# Patient Record
Sex: MALE | Race: WHITE | Employment: OTHER
[De-identification: names, ages, dates, MRNs, and addresses within clinical notes are randomized per-mention and may not be internally consistent; named-entity substitution may affect disease eponyms.]

---

## 2017-01-31 ENCOUNTER — TELEPHONE (OUTPATIENT)
Dept: FAMILY MEDICINE CLINIC | Facility: CLINIC | Age: 82
End: 2017-01-31

## 2017-01-31 RX ORDER — OMEPRAZOLE 20 MG/1
20 CAPSULE, DELAYED RELEASE ORAL DAILY
Qty: 30 CAPSULE | Refills: 2 | Status: CANCELLED | OUTPATIENT
Start: 2017-01-31

## 2017-02-06 DIAGNOSIS — E11.9 CONTROLLED TYPE 2 DIABETES MELLITUS WITHOUT COMPLICATION, WITHOUT LONG-TERM CURRENT USE OF INSULIN (HCC): Primary | ICD-10-CM

## 2017-02-06 RX ORDER — LANCETS 30 GAUGE
EACH MISCELLANEOUS
Qty: 100 EACH | Refills: 3 | Status: SHIPPED | OUTPATIENT
Start: 2017-02-06 | End: 2018-02-02 | Stop reason: SDUPTHER

## 2017-02-15 ENCOUNTER — TELEPHONE (OUTPATIENT)
Dept: FAMILY MEDICINE CLINIC | Facility: CLINIC | Age: 82
End: 2017-02-15

## 2017-02-15 DIAGNOSIS — G25.81 RESTLESS LEG SYNDROME: ICD-10-CM

## 2017-02-15 DIAGNOSIS — F41.9 ANXIETY: ICD-10-CM

## 2017-02-15 RX ORDER — CLONAZEPAM 0.5 MG/1
TABLET ORAL
Qty: 30 TABLET | Refills: 0 | Status: SHIPPED | OUTPATIENT
Start: 2017-02-15 | End: 2017-02-22 | Stop reason: SDUPTHER

## 2017-02-22 ENCOUNTER — TELEPHONE (OUTPATIENT)
Dept: FAMILY MEDICINE CLINIC | Facility: CLINIC | Age: 82
End: 2017-02-22

## 2017-02-22 DIAGNOSIS — F41.9 ANXIETY: ICD-10-CM

## 2017-02-22 DIAGNOSIS — G25.81 RESTLESS LEG SYNDROME: ICD-10-CM

## 2017-02-22 RX ORDER — CLONAZEPAM 0.5 MG/1
TABLET ORAL
Qty: 30 TABLET | Refills: 0 | Status: SHIPPED | OUTPATIENT
Start: 2017-02-22 | End: 2017-05-02 | Stop reason: SDUPTHER

## 2017-03-13 ENCOUNTER — OFFICE VISIT (OUTPATIENT)
Dept: FAMILY MEDICINE CLINIC | Age: 82
End: 2017-03-13
Payer: MEDICARE

## 2017-03-13 VITALS
RESPIRATION RATE: 18 BRPM | DIASTOLIC BLOOD PRESSURE: 62 MMHG | BODY MASS INDEX: 28.49 KG/M2 | SYSTOLIC BLOOD PRESSURE: 130 MMHG | HEART RATE: 66 BPM | WEIGHT: 171 LBS | HEIGHT: 65 IN

## 2017-03-13 DIAGNOSIS — N18.30 CKD STAGE 3 DUE TO TYPE 2 DIABETES MELLITUS (HCC): Primary | ICD-10-CM

## 2017-03-13 DIAGNOSIS — J44.9 CHRONIC OBSTRUCTIVE PULMONARY DISEASE, UNSPECIFIED COPD TYPE (HCC): ICD-10-CM

## 2017-03-13 DIAGNOSIS — I10 ESSENTIAL HYPERTENSION, BENIGN: ICD-10-CM

## 2017-03-13 DIAGNOSIS — E11.22 CKD STAGE 3 DUE TO TYPE 2 DIABETES MELLITUS (HCC): Primary | ICD-10-CM

## 2017-03-13 DIAGNOSIS — E78.00 PURE HYPERCHOLESTEROLEMIA: ICD-10-CM

## 2017-03-13 DIAGNOSIS — H54.40 BLIND RIGHT EYE: ICD-10-CM

## 2017-03-13 PROCEDURE — 1123F ACP DISCUSS/DSCN MKR DOCD: CPT | Performed by: NURSE PRACTITIONER

## 2017-03-13 PROCEDURE — 1036F TOBACCO NON-USER: CPT | Performed by: NURSE PRACTITIONER

## 2017-03-13 PROCEDURE — G8420 CALC BMI NORM PARAMETERS: HCPCS | Performed by: NURSE PRACTITIONER

## 2017-03-13 PROCEDURE — 99214 OFFICE O/P EST MOD 30 MIN: CPT | Performed by: NURSE PRACTITIONER

## 2017-03-13 PROCEDURE — G8926 SPIRO NO PERF OR DOC: HCPCS | Performed by: NURSE PRACTITIONER

## 2017-03-13 PROCEDURE — 4040F PNEUMOC VAC/ADMIN/RCVD: CPT | Performed by: NURSE PRACTITIONER

## 2017-03-13 PROCEDURE — G8484 FLU IMMUNIZE NO ADMIN: HCPCS | Performed by: NURSE PRACTITIONER

## 2017-03-13 PROCEDURE — G8427 DOCREV CUR MEDS BY ELIG CLIN: HCPCS | Performed by: NURSE PRACTITIONER

## 2017-03-13 PROCEDURE — 3023F SPIROM DOC REV: CPT | Performed by: NURSE PRACTITIONER

## 2017-03-13 ASSESSMENT — ENCOUNTER SYMPTOMS
ABDOMINAL DISTENTION: 1
BACK PAIN: 0
RHINORRHEA: 0
COUGH: 0

## 2017-05-02 ENCOUNTER — TELEPHONE (OUTPATIENT)
Dept: FAMILY MEDICINE CLINIC | Age: 82
End: 2017-05-02

## 2017-05-02 RX ORDER — CLONAZEPAM 0.5 MG/1
TABLET ORAL
Qty: 30 TABLET | Refills: 0 | Status: SHIPPED | OUTPATIENT
Start: 2017-05-02 | End: 2017-06-22 | Stop reason: SDUPTHER

## 2017-06-19 ENCOUNTER — OFFICE VISIT (OUTPATIENT)
Dept: FAMILY MEDICINE CLINIC | Age: 82
End: 2017-06-19
Payer: MEDICARE

## 2017-06-19 VITALS
WEIGHT: 169 LBS | HEIGHT: 65 IN | BODY MASS INDEX: 28.16 KG/M2 | RESPIRATION RATE: 18 BRPM | HEART RATE: 66 BPM | DIASTOLIC BLOOD PRESSURE: 60 MMHG | SYSTOLIC BLOOD PRESSURE: 120 MMHG

## 2017-06-19 DIAGNOSIS — E78.00 PURE HYPERCHOLESTEROLEMIA: ICD-10-CM

## 2017-06-19 DIAGNOSIS — F41.9 ANXIETY: ICD-10-CM

## 2017-06-19 DIAGNOSIS — I10 ESSENTIAL HYPERTENSION, BENIGN: Primary | ICD-10-CM

## 2017-06-19 DIAGNOSIS — Z51.81 ENCOUNTER FOR MONITORING STATIN THERAPY: ICD-10-CM

## 2017-06-19 DIAGNOSIS — H54.40 BLIND RIGHT EYE: ICD-10-CM

## 2017-06-19 DIAGNOSIS — N18.30 CKD STAGE 3 DUE TO TYPE 2 DIABETES MELLITUS (HCC): ICD-10-CM

## 2017-06-19 DIAGNOSIS — E11.22 CKD STAGE 3 DUE TO TYPE 2 DIABETES MELLITUS (HCC): ICD-10-CM

## 2017-06-19 DIAGNOSIS — K21.9 GASTROESOPHAGEAL REFLUX DISEASE WITHOUT ESOPHAGITIS: ICD-10-CM

## 2017-06-19 DIAGNOSIS — J44.9 CHRONIC OBSTRUCTIVE PULMONARY DISEASE, UNSPECIFIED COPD TYPE (HCC): ICD-10-CM

## 2017-06-19 DIAGNOSIS — Z79.899 ENCOUNTER FOR MONITORING STATIN THERAPY: ICD-10-CM

## 2017-06-19 DIAGNOSIS — E03.1 CONGENITAL HYPOTHYROIDISM WITHOUT GOITER: ICD-10-CM

## 2017-06-19 PROCEDURE — G8427 DOCREV CUR MEDS BY ELIG CLIN: HCPCS | Performed by: NURSE PRACTITIONER

## 2017-06-19 PROCEDURE — 4040F PNEUMOC VAC/ADMIN/RCVD: CPT | Performed by: NURSE PRACTITIONER

## 2017-06-19 PROCEDURE — 1123F ACP DISCUSS/DSCN MKR DOCD: CPT | Performed by: NURSE PRACTITIONER

## 2017-06-19 PROCEDURE — G8419 CALC BMI OUT NRM PARAM NOF/U: HCPCS | Performed by: NURSE PRACTITIONER

## 2017-06-19 PROCEDURE — G8926 SPIRO NO PERF OR DOC: HCPCS | Performed by: NURSE PRACTITIONER

## 2017-06-19 PROCEDURE — 99214 OFFICE O/P EST MOD 30 MIN: CPT | Performed by: NURSE PRACTITIONER

## 2017-06-19 PROCEDURE — 3023F SPIROM DOC REV: CPT | Performed by: NURSE PRACTITIONER

## 2017-06-19 PROCEDURE — 1036F TOBACCO NON-USER: CPT | Performed by: NURSE PRACTITIONER

## 2017-06-19 ASSESSMENT — ENCOUNTER SYMPTOMS
SINUS PRESSURE: 0
CHEST TIGHTNESS: 0
SHORTNESS OF BREATH: 0
ABDOMINAL DISTENTION: 0
ABDOMINAL PAIN: 0
BLOOD IN STOOL: 0
WHEEZING: 0
CONSTIPATION: 1
EYES NEGATIVE: 1

## 2017-06-22 ENCOUNTER — TELEPHONE (OUTPATIENT)
Dept: FAMILY MEDICINE CLINIC | Age: 82
End: 2017-06-22

## 2017-06-22 RX ORDER — CLONAZEPAM 0.5 MG/1
TABLET ORAL
Qty: 30 TABLET | Refills: 0 | Status: SHIPPED | OUTPATIENT
Start: 2017-06-22 | End: 2017-08-07 | Stop reason: SDUPTHER

## 2017-06-27 ENCOUNTER — HOSPITAL ENCOUNTER (OUTPATIENT)
Age: 82
Discharge: HOME | End: 2017-06-27
Payer: MEDICARE

## 2017-06-27 DIAGNOSIS — Z79.899 ENCOUNTER FOR MONITORING STATIN THERAPY: ICD-10-CM

## 2017-06-27 DIAGNOSIS — N18.30 CKD STAGE 3 DUE TO TYPE 2 DIABETES MELLITUS (HCC): ICD-10-CM

## 2017-06-27 DIAGNOSIS — I10 ESSENTIAL HYPERTENSION, BENIGN: ICD-10-CM

## 2017-06-27 DIAGNOSIS — E03.1 CONGENITAL HYPOTHYROIDISM WITHOUT GOITER: ICD-10-CM

## 2017-06-27 DIAGNOSIS — Z51.81 ENCOUNTER FOR MONITORING STATIN THERAPY: ICD-10-CM

## 2017-06-27 DIAGNOSIS — E11.22 CKD STAGE 3 DUE TO TYPE 2 DIABETES MELLITUS (HCC): ICD-10-CM

## 2017-06-27 DIAGNOSIS — E78.00 PURE HYPERCHOLESTEROLEMIA: ICD-10-CM

## 2017-06-27 LAB
ALBUMIN SERPL-MCNC: 3.8 G/DL (ref 3.5–5.2)
ALBUMIN/GLOBULIN RATIO: ABNORMAL (ref 1–2.5)
ALP BLD-CCNC: 175 U/L (ref 40–129)
ALT SERPL-CCNC: 21 U/L (ref 5–41)
ANION GAP SERPL CALCULATED.3IONS-SCNC: 13 MMOL/L (ref 9–17)
AST SERPL-CCNC: 25 U/L
BILIRUB SERPL-MCNC: 0.58 MG/DL (ref 0.3–1.2)
BUN BLDV-MCNC: 24 MG/DL (ref 8–23)
BUN/CREAT BLD: 13 (ref 9–20)
CALCIUM SERPL-MCNC: 9.6 MG/DL (ref 8.6–10.4)
CHLORIDE BLD-SCNC: 102 MMOL/L (ref 98–107)
CHOLESTEROL/HDL RATIO: 2.5
CHOLESTEROL: 129 MG/DL
CO2: 24 MMOL/L (ref 20–31)
CREAT SERPL-MCNC: 1.79 MG/DL (ref 0.7–1.2)
ESTIMATED AVERAGE GLUCOSE: 151 MG/DL
GFR AFRICAN AMERICAN: 44 ML/MIN
GFR NON-AFRICAN AMERICAN: 36 ML/MIN
GFR SERPL CREATININE-BSD FRML MDRD: ABNORMAL ML/MIN/{1.73_M2}
GFR SERPL CREATININE-BSD FRML MDRD: ABNORMAL ML/MIN/{1.73_M2}
GLUCOSE BLD-MCNC: 117 MG/DL (ref 70–99)
HBA1C MFR BLD: 6.9 % (ref 4.8–5.9)
HCT VFR BLD CALC: 35 % (ref 41–53)
HDLC SERPL-MCNC: 51 MG/DL
HEMOGLOBIN: 11.7 G/DL (ref 13.5–17.5)
LDL CHOLESTEROL: 57 MG/DL (ref 0–130)
PATIENT FASTING?: YES
POTASSIUM SERPL-SCNC: 4.3 MMOL/L (ref 3.7–5.3)
SODIUM BLD-SCNC: 139 MMOL/L (ref 135–144)
THYROXINE, FREE: 0.56 NG/DL (ref 0.93–1.7)
TOTAL PROTEIN: 6.8 G/DL (ref 6.4–8.3)
TRIGL SERPL-MCNC: 107 MG/DL
TSH SERPL DL<=0.05 MIU/L-ACNC: 5.81 MIU/L (ref 0.3–5)
VLDLC SERPL CALC-MCNC: NORMAL MG/DL (ref 1–30)

## 2017-06-27 PROCEDURE — 80061 LIPID PANEL: CPT

## 2017-06-27 PROCEDURE — 85018 HEMOGLOBIN: CPT

## 2017-06-27 PROCEDURE — 36415 COLL VENOUS BLD VENIPUNCTURE: CPT

## 2017-06-27 PROCEDURE — 83036 HEMOGLOBIN GLYCOSYLATED A1C: CPT

## 2017-06-27 PROCEDURE — 84443 ASSAY THYROID STIM HORMONE: CPT

## 2017-06-27 PROCEDURE — 84439 ASSAY OF FREE THYROXINE: CPT

## 2017-06-27 PROCEDURE — 80053 COMPREHEN METABOLIC PANEL: CPT

## 2017-06-27 PROCEDURE — 85014 HEMATOCRIT: CPT

## 2017-07-03 ENCOUNTER — TELEPHONE (OUTPATIENT)
Dept: FAMILY MEDICINE CLINIC | Age: 82
End: 2017-07-03

## 2017-07-03 DIAGNOSIS — E03.0 CONGENITAL HYPOTHYROIDISM WITH DIFFUSE GOITER: ICD-10-CM

## 2017-07-03 RX ORDER — LEVOTHYROXINE SODIUM 0.03 MG/1
25 TABLET ORAL DAILY
Qty: 90 TABLET | Refills: 1 | Status: SHIPPED | OUTPATIENT
Start: 2017-07-03 | End: 2017-08-01 | Stop reason: ALTCHOICE

## 2017-07-31 ENCOUNTER — TELEPHONE (OUTPATIENT)
Dept: FAMILY MEDICINE CLINIC | Age: 82
End: 2017-07-31

## 2017-08-01 ENCOUNTER — APPOINTMENT (OUTPATIENT)
Dept: CT IMAGING | Age: 82
End: 2017-08-01
Payer: MEDICARE

## 2017-08-01 ENCOUNTER — OFFICE VISIT (OUTPATIENT)
Dept: FAMILY MEDICINE CLINIC | Age: 82
End: 2017-08-01
Payer: MEDICARE

## 2017-08-01 ENCOUNTER — CARE COORDINATION (OUTPATIENT)
Dept: CARE COORDINATION | Age: 82
End: 2017-08-01

## 2017-08-01 ENCOUNTER — HOSPITAL ENCOUNTER (OUTPATIENT)
Age: 82
Discharge: HOME | End: 2017-08-01
Payer: MEDICARE

## 2017-08-01 ENCOUNTER — HOSPITAL ENCOUNTER (EMERGENCY)
Age: 82
Discharge: ANOTHER HOSPITAL | End: 2017-08-02
Attending: EMERGENCY MEDICINE
Payer: MEDICARE

## 2017-08-01 VITALS
TEMPERATURE: 98.3 F | SYSTOLIC BLOOD PRESSURE: 120 MMHG | BODY MASS INDEX: 28.29 KG/M2 | DIASTOLIC BLOOD PRESSURE: 62 MMHG | WEIGHT: 170 LBS | RESPIRATION RATE: 12 BRPM

## 2017-08-01 DIAGNOSIS — R17 JAUNDICE: ICD-10-CM

## 2017-08-01 DIAGNOSIS — R17 JAUNDICE: Primary | ICD-10-CM

## 2017-08-01 DIAGNOSIS — K80.50 CHOLEDOCHOLITHIASIS: ICD-10-CM

## 2017-08-01 DIAGNOSIS — R50.9 FEVER, UNSPECIFIED FEVER CAUSE: ICD-10-CM

## 2017-08-01 LAB
-: NORMAL
ABSOLUTE EOS #: 0.2 K/UL (ref 0–0.4)
ABSOLUTE LYMPH #: 1.6 K/UL (ref 0.9–2.5)
ABSOLUTE MONO #: 0.6 K/UL (ref 0–1)
ALBUMIN SERPL-MCNC: 3.7 G/DL (ref 3.5–5.2)
ALBUMIN/GLOBULIN RATIO: ABNORMAL (ref 1–2.5)
ALP BLD-CCNC: 229 U/L (ref 40–129)
ALT SERPL-CCNC: 91 U/L (ref 5–41)
AMORPHOUS: NORMAL
ANION GAP SERPL CALCULATED.3IONS-SCNC: 12 MMOL/L (ref 9–17)
AST SERPL-CCNC: 106 U/L
BACTERIA: NORMAL
BASOPHILS # BLD: 0 %
BASOPHILS ABSOLUTE: 0 K/UL (ref 0–0.2)
BILIRUB SERPL-MCNC: 6.76 MG/DL (ref 0.3–1.2)
BILIRUBIN URINE: NEGATIVE
BUN BLDV-MCNC: 26 MG/DL (ref 8–23)
BUN/CREAT BLD: 15 (ref 9–20)
CALCIUM SERPL-MCNC: 9 MG/DL (ref 8.6–10.4)
CASTS UA: NORMAL /LPF
CHLORIDE BLD-SCNC: 100 MMOL/L (ref 98–107)
CO2: 23 MMOL/L (ref 20–31)
COLOR: YELLOW
COMMENT UA: ABNORMAL
CREAT SERPL-MCNC: 1.68 MG/DL (ref 0.7–1.2)
CRYSTALS, UA: NORMAL /HPF
DIFFERENTIAL TYPE: YES
EOSINOPHILS RELATIVE PERCENT: 3 %
EPITHELIAL CELLS UA: NORMAL /HPF
GFR AFRICAN AMERICAN: 48 ML/MIN
GFR NON-AFRICAN AMERICAN: 39 ML/MIN
GFR SERPL CREATININE-BSD FRML MDRD: ABNORMAL ML/MIN/{1.73_M2}
GFR SERPL CREATININE-BSD FRML MDRD: ABNORMAL ML/MIN/{1.73_M2}
GLUCOSE BLD-MCNC: 124 MG/DL (ref 70–99)
GLUCOSE URINE: NEGATIVE
HAV IGM SER IA-ACNC: NONREACTIVE
HCT VFR BLD CALC: 33.1 % (ref 41–53)
HEMOGLOBIN: 11.2 G/DL (ref 13.5–17.5)
HEPATITIS B CORE IGM ANTIBODY: NONREACTIVE
HEPATITIS B SURFACE ANTIGEN: NONREACTIVE
HEPATITIS C ANTIBODY: NONREACTIVE
KETONES, URINE: NEGATIVE
LEUKOCYTE ESTERASE, URINE: NEGATIVE
LYMPHOCYTES # BLD: 22 %
MCH RBC QN AUTO: 31 PG (ref 26–34)
MCHC RBC AUTO-ENTMCNC: 33.8 G/DL (ref 31–37)
MCV RBC AUTO: 91.8 FL (ref 80–100)
MONOCYTES # BLD: 9 %
MUCUS: NORMAL
NITRITE, URINE: NEGATIVE
OTHER OBSERVATIONS UA: NORMAL
PDW BLD-RTO: 13 % (ref 12.1–15.2)
PH UA: 6 (ref 5–8)
PLATELET # BLD: 110 K/UL (ref 140–450)
PLATELET ESTIMATE: ABNORMAL
PMV BLD AUTO: ABNORMAL FL (ref 6–12)
POTASSIUM SERPL-SCNC: 3.8 MMOL/L (ref 3.7–5.3)
PROTEIN UA: ABNORMAL
RBC # BLD: 3.6 M/UL (ref 4.5–5.9)
RBC # BLD: ABNORMAL 10*6/UL
RBC UA: NORMAL /HPF (ref 0–2)
RENAL EPITHELIAL, UA: NORMAL /HPF
SEG NEUTROPHILS: 66 %
SEGMENTED NEUTROPHILS ABSOLUTE COUNT: 4.7 K/UL (ref 2.1–6.5)
SODIUM BLD-SCNC: 135 MMOL/L (ref 135–144)
SPECIFIC GRAVITY UA: 1.01 (ref 1–1.03)
TOTAL PROTEIN: 6.8 G/DL (ref 6.4–8.3)
TRICHOMONAS: NORMAL
TURBIDITY: CLEAR
URINE HGB: ABNORMAL
UROBILINOGEN, URINE: NORMAL
WBC # BLD: 7.1 K/UL (ref 3.5–11)
WBC # BLD: ABNORMAL 10*3/UL
WBC UA: NORMAL /HPF
YEAST: NORMAL

## 2017-08-01 PROCEDURE — 1123F ACP DISCUSS/DSCN MKR DOCD: CPT | Performed by: FAMILY MEDICINE

## 2017-08-01 PROCEDURE — 36415 COLL VENOUS BLD VENIPUNCTURE: CPT

## 2017-08-01 PROCEDURE — 85025 COMPLETE CBC W/AUTO DIFF WBC: CPT

## 2017-08-01 PROCEDURE — 1036F TOBACCO NON-USER: CPT | Performed by: FAMILY MEDICINE

## 2017-08-01 PROCEDURE — 81001 URINALYSIS AUTO W/SCOPE: CPT

## 2017-08-01 PROCEDURE — 6360000004 HC RX CONTRAST MEDICATION: Performed by: EMERGENCY MEDICINE

## 2017-08-01 PROCEDURE — 2580000003 HC RX 258: Performed by: EMERGENCY MEDICINE

## 2017-08-01 PROCEDURE — 80053 COMPREHEN METABOLIC PANEL: CPT

## 2017-08-01 PROCEDURE — 4040F PNEUMOC VAC/ADMIN/RCVD: CPT | Performed by: FAMILY MEDICINE

## 2017-08-01 PROCEDURE — 99285 EMERGENCY DEPT VISIT HI MDM: CPT

## 2017-08-01 PROCEDURE — G8419 CALC BMI OUT NRM PARAM NOF/U: HCPCS | Performed by: FAMILY MEDICINE

## 2017-08-01 PROCEDURE — 74177 CT ABD & PELVIS W/CONTRAST: CPT

## 2017-08-01 PROCEDURE — 99214 OFFICE O/P EST MOD 30 MIN: CPT | Performed by: FAMILY MEDICINE

## 2017-08-01 PROCEDURE — 80074 ACUTE HEPATITIS PANEL: CPT

## 2017-08-01 PROCEDURE — G8427 DOCREV CUR MEDS BY ELIG CLIN: HCPCS | Performed by: FAMILY MEDICINE

## 2017-08-01 RX ORDER — 0.9 % SODIUM CHLORIDE 0.9 %
1000 INTRAVENOUS SOLUTION INTRAVENOUS ONCE
Status: COMPLETED | OUTPATIENT
Start: 2017-08-01 | End: 2017-08-01

## 2017-08-01 RX ADMIN — SODIUM CHLORIDE 1000 ML: 0.9 INJECTION, SOLUTION INTRAVENOUS at 17:32

## 2017-08-01 RX ADMIN — IOVERSOL 50 ML: 741 INJECTION INTRA-ARTERIAL; INTRAVENOUS at 18:47

## 2017-08-01 ASSESSMENT — ENCOUNTER SYMPTOMS: RESPIRATORY NEGATIVE: 1

## 2017-08-02 ENCOUNTER — APPOINTMENT (OUTPATIENT)
Dept: GENERAL RADIOLOGY | Age: 82
DRG: 446 | End: 2017-08-02
Attending: INTERNAL MEDICINE
Payer: MEDICARE

## 2017-08-02 ENCOUNTER — ANESTHESIA EVENT (OUTPATIENT)
Dept: OPERATING ROOM | Age: 82
DRG: 446 | End: 2017-08-02
Payer: MEDICARE

## 2017-08-02 ENCOUNTER — HOSPITAL ENCOUNTER (INPATIENT)
Age: 82
LOS: 2 days | Discharge: HOME | DRG: 446 | End: 2017-08-04
Attending: INTERNAL MEDICINE | Admitting: INTERNAL MEDICINE
Payer: MEDICARE

## 2017-08-02 ENCOUNTER — ANESTHESIA (OUTPATIENT)
Dept: OPERATING ROOM | Age: 82
DRG: 446 | End: 2017-08-02
Payer: MEDICARE

## 2017-08-02 VITALS
HEART RATE: 69 BPM | HEIGHT: 65 IN | RESPIRATION RATE: 16 BRPM | SYSTOLIC BLOOD PRESSURE: 113 MMHG | TEMPERATURE: 98.1 F | BODY MASS INDEX: 27.49 KG/M2 | WEIGHT: 165 LBS | OXYGEN SATURATION: 96 % | DIASTOLIC BLOOD PRESSURE: 63 MMHG

## 2017-08-02 VITALS
OXYGEN SATURATION: 100 % | RESPIRATION RATE: 12 BRPM | DIASTOLIC BLOOD PRESSURE: 63 MMHG | TEMPERATURE: 97.8 F | SYSTOLIC BLOOD PRESSURE: 102 MMHG

## 2017-08-02 DIAGNOSIS — K80.51 CALCULUS OF BILE DUCT WITHOUT CHOLECYSTITIS WITH OBSTRUCTION: Primary | ICD-10-CM

## 2017-08-02 LAB
EKG ATRIAL RATE: 62 BPM
EKG P AXIS: 49 DEGREES
EKG P-R INTERVAL: 188 MS
EKG Q-T INTERVAL: 444 MS
EKG QRS DURATION: 98 MS
EKG QTC CALCULATION (BAZETT): 450 MS
EKG R AXIS: -44 DEGREES
EKG T AXIS: 0 DEGREES
EKG VENTRICULAR RATE: 62 BPM
ESTIMATED AVERAGE GLUCOSE: 160 MG/DL
GLUCOSE BLD-MCNC: 104 MG/DL (ref 75–110)
GLUCOSE BLD-MCNC: 185 MG/DL (ref 75–110)
HBA1C MFR BLD: 7.2 % (ref 4–6)

## 2017-08-02 PROCEDURE — 83036 HEMOGLOBIN GLYCOSYLATED A1C: CPT

## 2017-08-02 PROCEDURE — 3609018800 HC ERCP DX COLLECTION SPECIMEN BRUSHING/WASHING: Performed by: INTERNAL MEDICINE

## 2017-08-02 PROCEDURE — 99222 1ST HOSP IP/OBS MODERATE 55: CPT | Performed by: INTERNAL MEDICINE

## 2017-08-02 PROCEDURE — 3700000000 HC ANESTHESIA ATTENDED CARE: Performed by: INTERNAL MEDICINE

## 2017-08-02 PROCEDURE — 6360000002 HC RX W HCPCS: Performed by: INTERNAL MEDICINE

## 2017-08-02 PROCEDURE — 6370000000 HC RX 637 (ALT 250 FOR IP): Performed by: FAMILY MEDICINE

## 2017-08-02 PROCEDURE — 6360000002 HC RX W HCPCS: Performed by: NURSE ANESTHETIST, CERTIFIED REGISTERED

## 2017-08-02 PROCEDURE — 6370000000 HC RX 637 (ALT 250 FOR IP): Performed by: INTERNAL MEDICINE

## 2017-08-02 PROCEDURE — 6370000000 HC RX 637 (ALT 250 FOR IP): Performed by: NURSE PRACTITIONER

## 2017-08-02 PROCEDURE — 2580000003 HC RX 258: Performed by: NURSE ANESTHETIST, CERTIFIED REGISTERED

## 2017-08-02 PROCEDURE — C1887 CATHETER, GUIDING: HCPCS | Performed by: INTERNAL MEDICINE

## 2017-08-02 PROCEDURE — 0FC98ZZ EXTIRPATION OF MATTER FROM COMMON BILE DUCT, VIA NATURAL OR ARTIFICIAL OPENING ENDOSCOPIC: ICD-10-PCS | Performed by: INTERNAL MEDICINE

## 2017-08-02 PROCEDURE — 3700000001 HC ADD 15 MINUTES (ANESTHESIA): Performed by: INTERNAL MEDICINE

## 2017-08-02 PROCEDURE — 36415 COLL VENOUS BLD VENIPUNCTURE: CPT

## 2017-08-02 PROCEDURE — 0F798ZZ DILATION OF COMMON BILE DUCT, VIA NATURAL OR ARTIFICIAL OPENING ENDOSCOPIC: ICD-10-PCS | Performed by: INTERNAL MEDICINE

## 2017-08-02 PROCEDURE — 2580000003 HC RX 258: Performed by: NURSE PRACTITIONER

## 2017-08-02 PROCEDURE — 82947 ASSAY GLUCOSE BLOOD QUANT: CPT

## 2017-08-02 PROCEDURE — C1726 CATH, BAL DIL, NON-VASCULAR: HCPCS | Performed by: INTERNAL MEDICINE

## 2017-08-02 PROCEDURE — C1725 CATH, TRANSLUMIN NON-LASER: HCPCS | Performed by: INTERNAL MEDICINE

## 2017-08-02 PROCEDURE — 1200000000 HC SEMI PRIVATE

## 2017-08-02 PROCEDURE — 2500000003 HC RX 250 WO HCPCS: Performed by: NURSE ANESTHETIST, CERTIFIED REGISTERED

## 2017-08-02 PROCEDURE — 3209999900 FLUORO FOR SURGICAL PROCEDURES

## 2017-08-02 PROCEDURE — 7100000001 HC PACU RECOVERY - ADDTL 15 MIN: Performed by: INTERNAL MEDICINE

## 2017-08-02 PROCEDURE — 7100000000 HC PACU RECOVERY - FIRST 15 MIN: Performed by: INTERNAL MEDICINE

## 2017-08-02 PROCEDURE — 93005 ELECTROCARDIOGRAM TRACING: CPT

## 2017-08-02 RX ORDER — SODIUM CHLORIDE 0.9 % (FLUSH) 0.9 %
10 SYRINGE (ML) INJECTION PRN
Status: DISCONTINUED | OUTPATIENT
Start: 2017-08-02 | End: 2017-08-02 | Stop reason: HOSPADM

## 2017-08-02 RX ORDER — SODIUM CHLORIDE 0.9 % (FLUSH) 0.9 %
10 SYRINGE (ML) INJECTION EVERY 12 HOURS SCHEDULED
Status: DISCONTINUED | OUTPATIENT
Start: 2017-08-02 | End: 2017-08-04 | Stop reason: HOSPADM

## 2017-08-02 RX ORDER — ROCURONIUM BROMIDE 10 MG/ML
INJECTION, SOLUTION INTRAVENOUS PRN
Status: DISCONTINUED | OUTPATIENT
Start: 2017-08-02 | End: 2017-08-02 | Stop reason: SDUPTHER

## 2017-08-02 RX ORDER — DEXTROSE MONOHYDRATE 25 G/50ML
12.5 INJECTION, SOLUTION INTRAVENOUS PRN
Status: DISCONTINUED | OUTPATIENT
Start: 2017-08-02 | End: 2017-08-04 | Stop reason: HOSPADM

## 2017-08-02 RX ORDER — SODIUM CHLORIDE, SODIUM LACTATE, POTASSIUM CHLORIDE, CALCIUM CHLORIDE 600; 310; 30; 20 MG/100ML; MG/100ML; MG/100ML; MG/100ML
INJECTION, SOLUTION INTRAVENOUS CONTINUOUS PRN
Status: DISCONTINUED | OUTPATIENT
Start: 2017-08-02 | End: 2017-08-02 | Stop reason: SDUPTHER

## 2017-08-02 RX ORDER — NICOTINE 21 MG/24HR
1 PATCH, TRANSDERMAL 24 HOURS TRANSDERMAL DAILY PRN
Status: DISCONTINUED | OUTPATIENT
Start: 2017-08-02 | End: 2017-08-04 | Stop reason: HOSPADM

## 2017-08-02 RX ORDER — ONDANSETRON 2 MG/ML
INJECTION INTRAMUSCULAR; INTRAVENOUS PRN
Status: DISCONTINUED | OUTPATIENT
Start: 2017-08-02 | End: 2017-08-02 | Stop reason: SDUPTHER

## 2017-08-02 RX ORDER — MIDAZOLAM HYDROCHLORIDE 1 MG/ML
1 INJECTION INTRAMUSCULAR; INTRAVENOUS EVERY 10 MIN PRN
Status: DISCONTINUED | OUTPATIENT
Start: 2017-08-02 | End: 2017-08-02 | Stop reason: HOSPADM

## 2017-08-02 RX ORDER — SODIUM CHLORIDE 0.9 % (FLUSH) 0.9 %
10 SYRINGE (ML) INJECTION EVERY 12 HOURS SCHEDULED
Status: DISCONTINUED | OUTPATIENT
Start: 2017-08-02 | End: 2017-08-02 | Stop reason: HOSPADM

## 2017-08-02 RX ORDER — FENTANYL CITRATE 50 UG/ML
25 INJECTION, SOLUTION INTRAMUSCULAR; INTRAVENOUS EVERY 5 MIN PRN
Status: DISCONTINUED | OUTPATIENT
Start: 2017-08-02 | End: 2017-08-02 | Stop reason: HOSPADM

## 2017-08-02 RX ORDER — ONDANSETRON 2 MG/ML
4 INJECTION INTRAMUSCULAR; INTRAVENOUS EVERY 6 HOURS PRN
Status: DISCONTINUED | OUTPATIENT
Start: 2017-08-02 | End: 2017-08-04 | Stop reason: HOSPADM

## 2017-08-02 RX ORDER — POTASSIUM CHLORIDE 20 MEQ/1
40 TABLET, EXTENDED RELEASE ORAL PRN
Status: DISCONTINUED | OUTPATIENT
Start: 2017-08-02 | End: 2017-08-04 | Stop reason: HOSPADM

## 2017-08-02 RX ORDER — SODIUM CHLORIDE 9 MG/ML
INJECTION, SOLUTION INTRAVENOUS CONTINUOUS
Status: DISCONTINUED | OUTPATIENT
Start: 2017-08-02 | End: 2017-08-04 | Stop reason: HOSPADM

## 2017-08-02 RX ORDER — LIDOCAINE HYDROCHLORIDE 10 MG/ML
INJECTION, SOLUTION EPIDURAL; INFILTRATION; INTRACAUDAL; PERINEURAL PRN
Status: DISCONTINUED | OUTPATIENT
Start: 2017-08-02 | End: 2017-08-02 | Stop reason: SDUPTHER

## 2017-08-02 RX ORDER — MAGNESIUM SULFATE 1 G/100ML
1 INJECTION INTRAVENOUS PRN
Status: DISCONTINUED | OUTPATIENT
Start: 2017-08-02 | End: 2017-08-04 | Stop reason: HOSPADM

## 2017-08-02 RX ORDER — POTASSIUM CHLORIDE 7.45 MG/ML
10 INJECTION INTRAVENOUS PRN
Status: DISCONTINUED | OUTPATIENT
Start: 2017-08-02 | End: 2017-08-04 | Stop reason: HOSPADM

## 2017-08-02 RX ORDER — CIPROFLOXACIN 2 MG/ML
400 INJECTION, SOLUTION INTRAVENOUS EVERY 12 HOURS
Status: DISCONTINUED | OUTPATIENT
Start: 2017-08-02 | End: 2017-08-04 | Stop reason: HOSPADM

## 2017-08-02 RX ORDER — PROPOFOL 10 MG/ML
INJECTION, EMULSION INTRAVENOUS PRN
Status: DISCONTINUED | OUTPATIENT
Start: 2017-08-02 | End: 2017-08-02 | Stop reason: SDUPTHER

## 2017-08-02 RX ORDER — DEXTROSE MONOHYDRATE 50 MG/ML
100 INJECTION, SOLUTION INTRAVENOUS PRN
Status: DISCONTINUED | OUTPATIENT
Start: 2017-08-02 | End: 2017-08-04 | Stop reason: HOSPADM

## 2017-08-02 RX ORDER — MORPHINE SULFATE 2 MG/ML
2 INJECTION, SOLUTION INTRAMUSCULAR; INTRAVENOUS
Status: DISCONTINUED | OUTPATIENT
Start: 2017-08-02 | End: 2017-08-04 | Stop reason: HOSPADM

## 2017-08-02 RX ORDER — FENTANYL CITRATE 50 UG/ML
INJECTION, SOLUTION INTRAMUSCULAR; INTRAVENOUS PRN
Status: DISCONTINUED | OUTPATIENT
Start: 2017-08-02 | End: 2017-08-02 | Stop reason: SDUPTHER

## 2017-08-02 RX ORDER — POTASSIUM CHLORIDE 20MEQ/15ML
40 LIQUID (ML) ORAL PRN
Status: DISCONTINUED | OUTPATIENT
Start: 2017-08-02 | End: 2017-08-04 | Stop reason: HOSPADM

## 2017-08-02 RX ORDER — ACETAMINOPHEN 325 MG/1
650 TABLET ORAL EVERY 4 HOURS PRN
Status: DISCONTINUED | OUTPATIENT
Start: 2017-08-02 | End: 2017-08-04 | Stop reason: HOSPADM

## 2017-08-02 RX ORDER — HYDROCODONE BITARTRATE AND ACETAMINOPHEN 5; 325 MG/1; MG/1
1 TABLET ORAL ONCE
Status: COMPLETED | OUTPATIENT
Start: 2017-08-02 | End: 2017-08-02

## 2017-08-02 RX ORDER — NICOTINE POLACRILEX 4 MG
15 LOZENGE BUCCAL PRN
Status: DISCONTINUED | OUTPATIENT
Start: 2017-08-02 | End: 2017-08-04 | Stop reason: HOSPADM

## 2017-08-02 RX ORDER — SODIUM CHLORIDE 0.9 % (FLUSH) 0.9 %
10 SYRINGE (ML) INJECTION PRN
Status: DISCONTINUED | OUTPATIENT
Start: 2017-08-02 | End: 2017-08-04 | Stop reason: HOSPADM

## 2017-08-02 RX ORDER — BISACODYL 10 MG
10 SUPPOSITORY, RECTAL RECTAL DAILY PRN
Status: DISCONTINUED | OUTPATIENT
Start: 2017-08-02 | End: 2017-08-04 | Stop reason: HOSPADM

## 2017-08-02 RX ORDER — IPRATROPIUM BROMIDE AND ALBUTEROL SULFATE 2.5; .5 MG/3ML; MG/3ML
1 SOLUTION RESPIRATORY (INHALATION) ONCE
Status: DISCONTINUED | OUTPATIENT
Start: 2017-08-02 | End: 2017-08-03

## 2017-08-02 RX ORDER — MORPHINE SULFATE 4 MG/ML
4 INJECTION, SOLUTION INTRAMUSCULAR; INTRAVENOUS
Status: DISCONTINUED | OUTPATIENT
Start: 2017-08-02 | End: 2017-08-04 | Stop reason: HOSPADM

## 2017-08-02 RX ADMIN — CIPROFLOXACIN 400 MG: 2 INJECTION, SOLUTION INTRAVENOUS at 22:44

## 2017-08-02 RX ADMIN — SODIUM CHLORIDE: 9 INJECTION, SOLUTION INTRAVENOUS at 06:12

## 2017-08-02 RX ADMIN — CIPROFLOXACIN 400 MG: 2 INJECTION, SOLUTION INTRAVENOUS at 17:30

## 2017-08-02 RX ADMIN — HYDROCODONE BITARTRATE AND ACETAMINOPHEN 1 TABLET: 5; 325 TABLET ORAL at 02:40

## 2017-08-02 RX ADMIN — INSULIN LISPRO 1 UNITS: 100 INJECTION, SOLUTION INTRAVENOUS; SUBCUTANEOUS at 22:40

## 2017-08-02 RX ADMIN — CIPROFLOXACIN 400 MG: 2 INJECTION, SOLUTION INTRAVENOUS at 10:39

## 2017-08-02 RX ADMIN — FENTANYL CITRATE 100 MCG: 50 INJECTION INTRAMUSCULAR; INTRAVENOUS at 17:15

## 2017-08-02 ASSESSMENT — PAIN SCALES - GENERAL
PAINLEVEL_OUTOF10: 6
PAINLEVEL_OUTOF10: 0

## 2017-08-02 ASSESSMENT — PAIN - FUNCTIONAL ASSESSMENT: PAIN_FUNCTIONAL_ASSESSMENT: 0-10

## 2017-08-02 ASSESSMENT — COPD QUESTIONNAIRES: CAT_SEVERITY: MODERATE

## 2017-08-03 LAB
ABSOLUTE EOS #: 0.15 K/UL (ref 0–0.4)
ABSOLUTE LYMPH #: 0.6 K/UL (ref 1–4.8)
ABSOLUTE MONO #: 0.3 K/UL (ref 0.1–0.8)
ALBUMIN SERPL-MCNC: 3.3 G/DL (ref 3.5–5.2)
ALBUMIN/GLOBULIN RATIO: 1.2 (ref 1–2.5)
ALP BLD-CCNC: 216 U/L (ref 40–129)
ALT SERPL-CCNC: 65 U/L (ref 5–41)
ANION GAP SERPL CALCULATED.3IONS-SCNC: 12 MMOL/L (ref 9–17)
AST SERPL-CCNC: 70 U/L
BASOPHILS # BLD: 0 %
BASOPHILS ABSOLUTE: 0 K/UL (ref 0–0.2)
BILIRUB SERPL-MCNC: 6.77 MG/DL (ref 0.3–1.2)
BUN BLDV-MCNC: 29 MG/DL (ref 8–23)
BUN/CREAT BLD: ABNORMAL (ref 9–20)
CALCIUM SERPL-MCNC: 8.3 MG/DL (ref 8.6–10.4)
CHLORIDE BLD-SCNC: 110 MMOL/L (ref 98–107)
CO2: 20 MMOL/L (ref 20–31)
CREAT SERPL-MCNC: 1.54 MG/DL (ref 0.7–1.2)
DIFFERENTIAL TYPE: ABNORMAL
EOSINOPHILS RELATIVE PERCENT: 3 %
GFR AFRICAN AMERICAN: 53 ML/MIN
GFR NON-AFRICAN AMERICAN: 43 ML/MIN
GFR SERPL CREATININE-BSD FRML MDRD: ABNORMAL ML/MIN/{1.73_M2}
GFR SERPL CREATININE-BSD FRML MDRD: ABNORMAL ML/MIN/{1.73_M2}
GLUCOSE BLD-MCNC: 126 MG/DL (ref 75–110)
GLUCOSE BLD-MCNC: 126 MG/DL (ref 75–110)
GLUCOSE BLD-MCNC: 133 MG/DL (ref 75–110)
GLUCOSE BLD-MCNC: 180 MG/DL (ref 70–99)
GLUCOSE BLD-MCNC: 201 MG/DL (ref 75–110)
GLUCOSE BLD-MCNC: 91 MG/DL (ref 75–110)
HCT VFR BLD CALC: 32.6 % (ref 41–53)
HEMOGLOBIN: 10.8 G/DL (ref 13.5–17.5)
LIPASE: 74 U/L (ref 13–60)
LYMPHOCYTES # BLD: 12 %
MAGNESIUM: 1.9 MG/DL (ref 1.6–2.6)
MCH RBC QN AUTO: 30.6 PG (ref 26–34)
MCHC RBC AUTO-ENTMCNC: 33 G/DL (ref 31–37)
MCV RBC AUTO: 92.6 FL (ref 80–100)
MONOCYTES # BLD: 6 %
MORPHOLOGY: NORMAL
PDW BLD-RTO: 14.1 % (ref 12.5–15.4)
PLATELET # BLD: 123 K/UL (ref 140–450)
PLATELET ESTIMATE: ABNORMAL
PMV BLD AUTO: 6.8 FL (ref 6–12)
POTASSIUM SERPL-SCNC: 3.7 MMOL/L (ref 3.7–5.3)
RBC # BLD: 3.52 M/UL (ref 4.5–5.9)
RBC # BLD: ABNORMAL 10*6/UL
SEG NEUTROPHILS: 79 %
SEGMENTED NEUTROPHILS ABSOLUTE COUNT: 3.95 K/UL (ref 1.8–7.7)
SODIUM BLD-SCNC: 142 MMOL/L (ref 135–144)
TOTAL PROTEIN: 6.1 G/DL (ref 6.4–8.3)
WBC # BLD: 5 K/UL (ref 3.5–11)
WBC # BLD: ABNORMAL 10*3/UL

## 2017-08-03 PROCEDURE — 2500000003 HC RX 250 WO HCPCS: Performed by: INTERNAL MEDICINE

## 2017-08-03 PROCEDURE — 99232 SBSQ HOSP IP/OBS MODERATE 35: CPT | Performed by: INTERNAL MEDICINE

## 2017-08-03 PROCEDURE — 6360000002 HC RX W HCPCS: Performed by: NURSE PRACTITIONER

## 2017-08-03 PROCEDURE — 83690 ASSAY OF LIPASE: CPT

## 2017-08-03 PROCEDURE — 82947 ASSAY GLUCOSE BLOOD QUANT: CPT

## 2017-08-03 PROCEDURE — 36415 COLL VENOUS BLD VENIPUNCTURE: CPT

## 2017-08-03 PROCEDURE — 6370000000 HC RX 637 (ALT 250 FOR IP): Performed by: NURSE PRACTITIONER

## 2017-08-03 PROCEDURE — 6360000002 HC RX W HCPCS: Performed by: INTERNAL MEDICINE

## 2017-08-03 PROCEDURE — 6370000000 HC RX 637 (ALT 250 FOR IP): Performed by: INTERNAL MEDICINE

## 2017-08-03 PROCEDURE — 85025 COMPLETE CBC W/AUTO DIFF WBC: CPT

## 2017-08-03 PROCEDURE — 1200000000 HC SEMI PRIVATE

## 2017-08-03 PROCEDURE — 83735 ASSAY OF MAGNESIUM: CPT

## 2017-08-03 PROCEDURE — 80053 COMPREHEN METABOLIC PANEL: CPT

## 2017-08-03 RX ORDER — ALBUTEROL SULFATE 90 UG/1
2 AEROSOL, METERED RESPIRATORY (INHALATION) EVERY 6 HOURS PRN
Status: DISCONTINUED | OUTPATIENT
Start: 2017-08-03 | End: 2017-08-04 | Stop reason: HOSPADM

## 2017-08-03 RX ORDER — METRONIDAZOLE 500 MG/1
500 TABLET ORAL 3 TIMES DAILY
Qty: 15 TABLET | Refills: 0 | Status: SHIPPED | OUTPATIENT
Start: 2017-08-03 | End: 2017-08-08

## 2017-08-03 RX ORDER — PANTOPRAZOLE SODIUM 40 MG/1
40 TABLET, DELAYED RELEASE ORAL
Status: DISCONTINUED | OUTPATIENT
Start: 2017-08-03 | End: 2017-08-04 | Stop reason: HOSPADM

## 2017-08-03 RX ORDER — CIPROFLOXACIN 500 MG/1
500 TABLET, FILM COATED ORAL 2 TIMES DAILY
Qty: 10 TABLET | Refills: 0 | Status: SHIPPED | OUTPATIENT
Start: 2017-08-03 | End: 2017-08-08

## 2017-08-03 RX ADMIN — METRONIDAZOLE 500 MG: 500 INJECTION, SOLUTION INTRAVENOUS at 02:41

## 2017-08-03 RX ADMIN — CIPROFLOXACIN 400 MG: 2 INJECTION, SOLUTION INTRAVENOUS at 10:27

## 2017-08-03 RX ADMIN — METRONIDAZOLE 500 MG: 500 INJECTION, SOLUTION INTRAVENOUS at 19:52

## 2017-08-03 RX ADMIN — PANTOPRAZOLE SODIUM 40 MG: 40 TABLET, DELAYED RELEASE ORAL at 19:52

## 2017-08-03 RX ADMIN — CIPROFLOXACIN 400 MG: 2 INJECTION, SOLUTION INTRAVENOUS at 23:43

## 2017-08-03 RX ADMIN — INSULIN LISPRO 2 UNITS: 100 INJECTION, SOLUTION INTRAVENOUS; SUBCUTANEOUS at 12:53

## 2017-08-03 RX ADMIN — METRONIDAZOLE 500 MG: 500 INJECTION, SOLUTION INTRAVENOUS at 12:17

## 2017-08-03 RX ADMIN — ACETAMINOPHEN 650 MG: 325 TABLET ORAL at 09:07

## 2017-08-03 RX ADMIN — ENOXAPARIN SODIUM 40 MG: 40 INJECTION SUBCUTANEOUS at 10:29

## 2017-08-03 ASSESSMENT — PAIN SCALES - GENERAL
PAINLEVEL_OUTOF10: 3
PAINLEVEL_OUTOF10: 0

## 2017-08-04 ENCOUNTER — APPOINTMENT (OUTPATIENT)
Dept: ULTRASOUND IMAGING | Age: 82
DRG: 446 | End: 2017-08-04
Attending: INTERNAL MEDICINE
Payer: MEDICARE

## 2017-08-04 VITALS
WEIGHT: 172.4 LBS | HEART RATE: 57 BPM | DIASTOLIC BLOOD PRESSURE: 55 MMHG | TEMPERATURE: 98.4 F | OXYGEN SATURATION: 93 % | HEIGHT: 65 IN | BODY MASS INDEX: 28.72 KG/M2 | RESPIRATION RATE: 19 BRPM | SYSTOLIC BLOOD PRESSURE: 117 MMHG

## 2017-08-04 DIAGNOSIS — E03.0 CONGENITAL HYPOTHYROIDISM WITH DIFFUSE GOITER: ICD-10-CM

## 2017-08-04 LAB
ALBUMIN SERPL-MCNC: 3.3 G/DL (ref 3.5–5.2)
ALBUMIN/GLOBULIN RATIO: 1.3 (ref 1–2.5)
ALP BLD-CCNC: 226 U/L (ref 40–129)
ALT SERPL-CCNC: 64 U/L (ref 5–41)
AST SERPL-CCNC: 79 U/L
BILIRUB SERPL-MCNC: 5.27 MG/DL (ref 0.3–1.2)
BILIRUBIN DIRECT: 4.25 MG/DL
BILIRUBIN, INDIRECT: 1.02 MG/DL (ref 0–1)
GLOBULIN: ABNORMAL G/DL (ref 1.5–3.8)
GLUCOSE BLD-MCNC: 112 MG/DL (ref 75–110)
TOTAL PROTEIN: 5.8 G/DL (ref 6.4–8.3)

## 2017-08-04 PROCEDURE — 6370000000 HC RX 637 (ALT 250 FOR IP): Performed by: INTERNAL MEDICINE

## 2017-08-04 PROCEDURE — 2500000003 HC RX 250 WO HCPCS: Performed by: INTERNAL MEDICINE

## 2017-08-04 PROCEDURE — 99239 HOSP IP/OBS DSCHRG MGMT >30: CPT | Performed by: INTERNAL MEDICINE

## 2017-08-04 PROCEDURE — 6370000000 HC RX 637 (ALT 250 FOR IP): Performed by: NURSE PRACTITIONER

## 2017-08-04 PROCEDURE — 36415 COLL VENOUS BLD VENIPUNCTURE: CPT

## 2017-08-04 PROCEDURE — 76705 ECHO EXAM OF ABDOMEN: CPT

## 2017-08-04 PROCEDURE — 82947 ASSAY GLUCOSE BLOOD QUANT: CPT

## 2017-08-04 PROCEDURE — 6360000002 HC RX W HCPCS: Performed by: INTERNAL MEDICINE

## 2017-08-04 PROCEDURE — 80076 HEPATIC FUNCTION PANEL: CPT

## 2017-08-04 PROCEDURE — 2580000003 HC RX 258: Performed by: NURSE PRACTITIONER

## 2017-08-04 RX ORDER — LEVOTHYROXINE SODIUM 0.03 MG/1
25 TABLET ORAL DAILY
Qty: 90 TABLET | Refills: 1 | Status: SHIPPED | OUTPATIENT
Start: 2017-08-04 | End: 2017-08-07

## 2017-08-04 RX ADMIN — METRONIDAZOLE 500 MG: 500 INJECTION, SOLUTION INTRAVENOUS at 10:59

## 2017-08-04 RX ADMIN — ACETAMINOPHEN 650 MG: 325 TABLET ORAL at 01:09

## 2017-08-04 RX ADMIN — CIPROFLOXACIN 400 MG: 2 INJECTION, SOLUTION INTRAVENOUS at 12:01

## 2017-08-04 RX ADMIN — SODIUM CHLORIDE: 9 INJECTION, SOLUTION INTRAVENOUS at 06:19

## 2017-08-04 RX ADMIN — PANTOPRAZOLE SODIUM 40 MG: 40 TABLET, DELAYED RELEASE ORAL at 06:19

## 2017-08-04 RX ADMIN — METRONIDAZOLE 500 MG: 500 INJECTION, SOLUTION INTRAVENOUS at 02:08

## 2017-08-04 ASSESSMENT — PAIN SCALES - GENERAL
PAINLEVEL_OUTOF10: 2
PAINLEVEL_OUTOF10: 1

## 2017-08-05 ENCOUNTER — CARE COORDINATION (OUTPATIENT)
Dept: CASE MANAGEMENT | Age: 82
End: 2017-08-05

## 2017-08-07 ENCOUNTER — CARE COORDINATION (OUTPATIENT)
Dept: CASE MANAGEMENT | Age: 82
End: 2017-08-07

## 2017-08-07 ENCOUNTER — TELEPHONE (OUTPATIENT)
Dept: FAMILY MEDICINE CLINIC | Age: 82
End: 2017-08-07

## 2017-08-07 ENCOUNTER — TELEPHONE (OUTPATIENT)
Dept: PHARMACY | Facility: CLINIC | Age: 82
End: 2017-08-07

## 2017-08-07 DIAGNOSIS — E03.1 CONGENITAL HYPOTHYROIDISM WITHOUT GOITER: Primary | ICD-10-CM

## 2017-08-07 RX ORDER — CLONAZEPAM 0.5 MG/1
TABLET ORAL
Qty: 30 TABLET | Refills: 1 | Status: SHIPPED | OUTPATIENT
Start: 2017-08-07 | End: 2017-10-16 | Stop reason: SDUPTHER

## 2017-08-08 ENCOUNTER — HOSPITAL ENCOUNTER (OUTPATIENT)
Age: 82
Discharge: HOME | End: 2017-08-08
Payer: MEDICARE

## 2017-08-08 ENCOUNTER — OFFICE VISIT (OUTPATIENT)
Dept: FAMILY MEDICINE CLINIC | Age: 82
End: 2017-08-08
Payer: MEDICARE

## 2017-08-08 VITALS
HEIGHT: 65 IN | BODY MASS INDEX: 28.99 KG/M2 | SYSTOLIC BLOOD PRESSURE: 134 MMHG | WEIGHT: 174 LBS | HEART RATE: 56 BPM | DIASTOLIC BLOOD PRESSURE: 68 MMHG

## 2017-08-08 DIAGNOSIS — E03.9 ACQUIRED HYPOTHYROIDISM: ICD-10-CM

## 2017-08-08 DIAGNOSIS — Z09 HOSPITAL DISCHARGE FOLLOW-UP: ICD-10-CM

## 2017-08-08 DIAGNOSIS — F51.01 PRIMARY INSOMNIA: ICD-10-CM

## 2017-08-08 DIAGNOSIS — K80.51 CALCULUS OF BILE DUCT WITHOUT CHOLECYSTITIS WITH OBSTRUCTION: Primary | ICD-10-CM

## 2017-08-08 DIAGNOSIS — K59.01 CONSTIPATION, SLOW TRANSIT: ICD-10-CM

## 2017-08-08 DIAGNOSIS — K80.51 CALCULUS OF BILE DUCT WITHOUT CHOLECYSTITIS WITH OBSTRUCTION: ICD-10-CM

## 2017-08-08 LAB
ABSOLUTE EOS #: 0.2 K/UL (ref 0–0.4)
ABSOLUTE LYMPH #: 1.8 K/UL (ref 0.9–2.5)
ABSOLUTE MONO #: 0.4 K/UL (ref 0–1)
ALBUMIN SERPL-MCNC: 3.5 G/DL (ref 3.5–5.2)
ALBUMIN/GLOBULIN RATIO: ABNORMAL (ref 1–2.5)
ALP BLD-CCNC: 399 U/L (ref 40–129)
ALT SERPL-CCNC: 62 U/L (ref 5–41)
ANION GAP SERPL CALCULATED.3IONS-SCNC: 12 MMOL/L (ref 9–17)
AST SERPL-CCNC: 92 U/L
BASOPHILS # BLD: 1 %
BASOPHILS ABSOLUTE: 0 K/UL (ref 0–0.2)
BILIRUB SERPL-MCNC: 3.09 MG/DL (ref 0.3–1.2)
BUN BLDV-MCNC: 22 MG/DL (ref 8–23)
BUN/CREAT BLD: 13 (ref 9–20)
CALCIUM SERPL-MCNC: 8.9 MG/DL (ref 8.6–10.4)
CHLORIDE BLD-SCNC: 105 MMOL/L (ref 98–107)
CO2: 22 MMOL/L (ref 20–31)
CREAT SERPL-MCNC: 1.64 MG/DL (ref 0.7–1.2)
DIFFERENTIAL TYPE: YES
EOSINOPHILS RELATIVE PERCENT: 5 %
GFR AFRICAN AMERICAN: 49 ML/MIN
GFR NON-AFRICAN AMERICAN: 40 ML/MIN
GFR SERPL CREATININE-BSD FRML MDRD: ABNORMAL ML/MIN/{1.73_M2}
GFR SERPL CREATININE-BSD FRML MDRD: ABNORMAL ML/MIN/{1.73_M2}
GLUCOSE BLD-MCNC: 227 MG/DL (ref 70–99)
HCT VFR BLD CALC: 31.6 % (ref 41–53)
HEMOGLOBIN: 10.4 G/DL (ref 13.5–17.5)
LYMPHOCYTES # BLD: 37 %
MCH RBC QN AUTO: 30.6 PG (ref 26–34)
MCHC RBC AUTO-ENTMCNC: 32.8 G/DL (ref 31–37)
MCV RBC AUTO: 93.4 FL (ref 80–100)
MONOCYTES # BLD: 9 %
PDW BLD-RTO: 14.5 % (ref 12.1–15.2)
PLATELET # BLD: 195 K/UL (ref 140–450)
PLATELET ESTIMATE: ABNORMAL
PMV BLD AUTO: ABNORMAL FL (ref 6–12)
POTASSIUM SERPL-SCNC: 3.6 MMOL/L (ref 3.7–5.3)
RBC # BLD: 3.38 M/UL (ref 4.5–5.9)
RBC # BLD: ABNORMAL 10*6/UL
SEG NEUTROPHILS: 48 %
SEGMENTED NEUTROPHILS ABSOLUTE COUNT: 2.3 K/UL (ref 2.1–6.5)
SODIUM BLD-SCNC: 139 MMOL/L (ref 135–144)
THYROXINE, FREE: 0.65 NG/DL (ref 0.93–1.7)
TOTAL PROTEIN: 6.3 G/DL (ref 6.4–8.3)
TSH SERPL DL<=0.05 MIU/L-ACNC: 2.27 MIU/L (ref 0.3–5)
WBC # BLD: 4.8 K/UL (ref 3.5–11)
WBC # BLD: ABNORMAL 10*3/UL

## 2017-08-08 PROCEDURE — 84439 ASSAY OF FREE THYROXINE: CPT

## 2017-08-08 PROCEDURE — 99496 TRANSJ CARE MGMT HIGH F2F 7D: CPT | Performed by: FAMILY MEDICINE

## 2017-08-08 PROCEDURE — 36415 COLL VENOUS BLD VENIPUNCTURE: CPT

## 2017-08-08 PROCEDURE — 85025 COMPLETE CBC W/AUTO DIFF WBC: CPT

## 2017-08-08 PROCEDURE — 84443 ASSAY THYROID STIM HORMONE: CPT

## 2017-08-08 PROCEDURE — 80053 COMPREHEN METABOLIC PANEL: CPT

## 2017-08-08 RX ORDER — LEVOTHYROXINE AND LIOTHYRONINE 9.5; 2.25 UG/1; UG/1
15 TABLET ORAL DAILY
Qty: 30 TABLET | Refills: 1 | Status: SHIPPED | OUTPATIENT
Start: 2017-08-08 | End: 2017-10-16 | Stop reason: SDUPTHER

## 2017-08-09 ASSESSMENT — ENCOUNTER SYMPTOMS
RESPIRATORY NEGATIVE: 1
GASTROINTESTINAL NEGATIVE: 1

## 2017-08-10 ENCOUNTER — CARE COORDINATION (OUTPATIENT)
Dept: CASE MANAGEMENT | Age: 82
End: 2017-08-10

## 2017-08-16 ENCOUNTER — CARE COORDINATION (OUTPATIENT)
Dept: CARE COORDINATION | Age: 82
End: 2017-08-16

## 2017-08-17 ENCOUNTER — HOSPITAL ENCOUNTER (OUTPATIENT)
Age: 82
Discharge: HOME | End: 2017-08-17
Payer: MEDICARE

## 2017-08-17 ENCOUNTER — TELEPHONE (OUTPATIENT)
Dept: FAMILY MEDICINE CLINIC | Age: 82
End: 2017-08-17

## 2017-08-17 ENCOUNTER — TELEPHONE (OUTPATIENT)
Dept: GASTROENTEROLOGY | Age: 82
End: 2017-08-17

## 2017-08-17 ENCOUNTER — CARE COORDINATION (OUTPATIENT)
Dept: CARE COORDINATION | Age: 82
End: 2017-08-17

## 2017-08-17 DIAGNOSIS — R74.8 ELEVATED LIVER ENZYMES: Primary | ICD-10-CM

## 2017-08-17 DIAGNOSIS — K74.60 CIRRHOSIS OF LIVER WITHOUT ASCITES, UNSPECIFIED HEPATIC CIRRHOSIS TYPE (HCC): ICD-10-CM

## 2017-08-17 DIAGNOSIS — R74.8 ABNORMAL LIVER ENZYMES: ICD-10-CM

## 2017-08-17 DIAGNOSIS — R74.8 ELEVATED LIVER ENZYMES: ICD-10-CM

## 2017-08-17 DIAGNOSIS — K74.60 CIRRHOSIS OF LIVER WITHOUT ASCITES, UNSPECIFIED HEPATIC CIRRHOSIS TYPE (HCC): Primary | ICD-10-CM

## 2017-08-17 LAB
ALBUMIN SERPL-MCNC: 3.5 G/DL (ref 3.5–5.2)
ALBUMIN SERPL-MCNC: 3.6 G/DL (ref 3.5–5.2)
ALBUMIN/GLOBULIN RATIO: ABNORMAL (ref 1–2.5)
ALBUMIN/GLOBULIN RATIO: ABNORMAL (ref 1–2.5)
ALP BLD-CCNC: 386 U/L (ref 40–129)
ALP BLD-CCNC: 394 U/L (ref 40–129)
ALT SERPL-CCNC: 46 U/L (ref 5–41)
ALT SERPL-CCNC: 46 U/L (ref 5–41)
ANION GAP SERPL CALCULATED.3IONS-SCNC: 12 MMOL/L (ref 9–17)
AST SERPL-CCNC: 84 U/L
AST SERPL-CCNC: 85 U/L
BILIRUB SERPL-MCNC: 1.73 MG/DL (ref 0.3–1.2)
BILIRUB SERPL-MCNC: 1.79 MG/DL (ref 0.3–1.2)
BILIRUBIN DIRECT: 0.96 MG/DL
BILIRUBIN, INDIRECT: 0.83 MG/DL (ref 0–1)
BUN BLDV-MCNC: 16 MG/DL (ref 8–23)
BUN/CREAT BLD: 10 (ref 9–20)
CALCIUM SERPL-MCNC: 9.2 MG/DL (ref 8.6–10.4)
CHLORIDE BLD-SCNC: 98 MMOL/L (ref 98–107)
CO2: 24 MMOL/L (ref 20–31)
CREAT SERPL-MCNC: 1.54 MG/DL (ref 0.7–1.2)
GFR AFRICAN AMERICAN: 53 ML/MIN
GFR NON-AFRICAN AMERICAN: 43 ML/MIN
GFR SERPL CREATININE-BSD FRML MDRD: ABNORMAL ML/MIN/{1.73_M2}
GFR SERPL CREATININE-BSD FRML MDRD: ABNORMAL ML/MIN/{1.73_M2}
GLOBULIN: ABNORMAL G/DL (ref 1.5–3.8)
GLUCOSE BLD-MCNC: 205 MG/DL (ref 70–99)
POTASSIUM SERPL-SCNC: 3.6 MMOL/L (ref 3.7–5.3)
SODIUM BLD-SCNC: 134 MMOL/L (ref 135–144)
TOTAL PROTEIN: 6.7 G/DL (ref 6.4–8.3)
TOTAL PROTEIN: 6.7 G/DL (ref 6.4–8.3)

## 2017-08-17 PROCEDURE — 36415 COLL VENOUS BLD VENIPUNCTURE: CPT

## 2017-08-17 PROCEDURE — 80053 COMPREHEN METABOLIC PANEL: CPT

## 2017-08-17 PROCEDURE — 80076 HEPATIC FUNCTION PANEL: CPT

## 2017-08-18 ENCOUNTER — CARE COORDINATION (OUTPATIENT)
Dept: CASE MANAGEMENT | Age: 82
End: 2017-08-18

## 2017-08-18 ENCOUNTER — TELEPHONE (OUTPATIENT)
Dept: FAMILY MEDICINE CLINIC | Age: 82
End: 2017-08-18

## 2017-08-18 DIAGNOSIS — Z13.9 SCREENING PROCEDURE: Primary | ICD-10-CM

## 2017-08-21 DIAGNOSIS — Z13.9 SCREENING PROCEDURE: ICD-10-CM

## 2017-08-22 ENCOUNTER — HOSPITAL ENCOUNTER (OUTPATIENT)
Age: 82
Discharge: HOME | End: 2017-08-22
Payer: MEDICARE

## 2017-08-22 ENCOUNTER — TELEPHONE (OUTPATIENT)
Dept: FAMILY MEDICINE CLINIC | Age: 82
End: 2017-08-22

## 2017-08-22 DIAGNOSIS — R74.8 ELEVATED LIVER ENZYMES: Primary | ICD-10-CM

## 2017-08-22 DIAGNOSIS — R74.8 ELEVATED LIVER ENZYMES: ICD-10-CM

## 2017-08-22 LAB
ALBUMIN SERPL-MCNC: 3.6 G/DL (ref 3.5–5.2)
ALBUMIN/GLOBULIN RATIO: ABNORMAL (ref 1–2.5)
ALP BLD-CCNC: 340 U/L (ref 40–129)
ALT SERPL-CCNC: 39 U/L (ref 5–41)
AST SERPL-CCNC: 73 U/L
BILIRUB SERPL-MCNC: 1.48 MG/DL (ref 0.3–1.2)
BILIRUBIN DIRECT: 0.76 MG/DL
BILIRUBIN, INDIRECT: 0.72 MG/DL (ref 0–1)
GLOBULIN: ABNORMAL G/DL (ref 1.5–3.8)
TOTAL PROTEIN: 6.8 G/DL (ref 6.4–8.3)

## 2017-08-22 PROCEDURE — 80076 HEPATIC FUNCTION PANEL: CPT

## 2017-08-22 PROCEDURE — 36415 COLL VENOUS BLD VENIPUNCTURE: CPT

## 2017-08-22 RX ORDER — PANTOPRAZOLE SODIUM 40 MG/1
TABLET, DELAYED RELEASE ORAL
Qty: 90 TABLET | Refills: 1 | Status: SHIPPED | OUTPATIENT
Start: 2017-08-22 | End: 2018-02-04 | Stop reason: SDUPTHER

## 2017-08-23 RX ORDER — GLIPIZIDE 10 MG/1
TABLET ORAL
Qty: 90 TABLET | Refills: 1 | Status: SHIPPED | OUTPATIENT
Start: 2017-08-23 | End: 2018-02-04 | Stop reason: SDUPTHER

## 2017-08-24 ENCOUNTER — CARE COORDINATION (OUTPATIENT)
Dept: CARE COORDINATION | Age: 82
End: 2017-08-24

## 2017-08-28 ENCOUNTER — OFFICE VISIT (OUTPATIENT)
Dept: FAMILY MEDICINE CLINIC | Age: 82
End: 2017-08-28
Payer: MEDICARE

## 2017-08-28 ENCOUNTER — HOSPITAL ENCOUNTER (OUTPATIENT)
Age: 82
Discharge: HOME | End: 2017-08-28
Payer: MEDICARE

## 2017-08-28 VITALS
SYSTOLIC BLOOD PRESSURE: 136 MMHG | HEIGHT: 65 IN | WEIGHT: 169 LBS | BODY MASS INDEX: 28.16 KG/M2 | DIASTOLIC BLOOD PRESSURE: 64 MMHG

## 2017-08-28 DIAGNOSIS — R74.8 ELEVATED LIVER ENZYMES: ICD-10-CM

## 2017-08-28 DIAGNOSIS — R74.8 ELEVATED LIVER ENZYMES: Primary | ICD-10-CM

## 2017-08-28 DIAGNOSIS — K59.09 CHRONIC CONSTIPATION: ICD-10-CM

## 2017-08-28 DIAGNOSIS — E03.9 ACQUIRED HYPOTHYROIDISM: ICD-10-CM

## 2017-08-28 LAB
ALBUMIN SERPL-MCNC: 3.7 G/DL (ref 3.5–5.2)
ALBUMIN/GLOBULIN RATIO: ABNORMAL (ref 1–2.5)
ALP BLD-CCNC: 296 U/L (ref 40–129)
ALT SERPL-CCNC: 33 U/L (ref 5–41)
AST SERPL-CCNC: 63 U/L
BILIRUB SERPL-MCNC: 1.11 MG/DL (ref 0.3–1.2)
BILIRUBIN DIRECT: 0.61 MG/DL
BILIRUBIN, INDIRECT: 0.5 MG/DL (ref 0–1)
GLOBULIN: ABNORMAL G/DL (ref 1.5–3.8)
TOTAL PROTEIN: 6.9 G/DL (ref 6.4–8.3)

## 2017-08-28 PROCEDURE — G8427 DOCREV CUR MEDS BY ELIG CLIN: HCPCS | Performed by: FAMILY MEDICINE

## 2017-08-28 PROCEDURE — 36415 COLL VENOUS BLD VENIPUNCTURE: CPT

## 2017-08-28 PROCEDURE — 1123F ACP DISCUSS/DSCN MKR DOCD: CPT | Performed by: FAMILY MEDICINE

## 2017-08-28 PROCEDURE — G8419 CALC BMI OUT NRM PARAM NOF/U: HCPCS | Performed by: FAMILY MEDICINE

## 2017-08-28 PROCEDURE — 99214 OFFICE O/P EST MOD 30 MIN: CPT | Performed by: FAMILY MEDICINE

## 2017-08-28 PROCEDURE — 1111F DSCHRG MED/CURRENT MED MERGE: CPT | Performed by: FAMILY MEDICINE

## 2017-08-28 PROCEDURE — 80076 HEPATIC FUNCTION PANEL: CPT

## 2017-08-28 PROCEDURE — 4040F PNEUMOC VAC/ADMIN/RCVD: CPT | Performed by: FAMILY MEDICINE

## 2017-08-28 PROCEDURE — 1036F TOBACCO NON-USER: CPT | Performed by: FAMILY MEDICINE

## 2017-08-30 ENCOUNTER — CARE COORDINATION (OUTPATIENT)
Dept: CARE COORDINATION | Age: 82
End: 2017-08-30

## 2017-09-07 ENCOUNTER — HOSPITAL ENCOUNTER (OUTPATIENT)
Age: 82
Discharge: HOME | End: 2017-09-07
Payer: MEDICARE

## 2017-09-07 ENCOUNTER — TELEPHONE (OUTPATIENT)
Dept: FAMILY MEDICINE CLINIC | Age: 82
End: 2017-09-07

## 2017-09-07 ENCOUNTER — CARE COORDINATION (OUTPATIENT)
Dept: CARE COORDINATION | Age: 82
End: 2017-09-07

## 2017-09-07 DIAGNOSIS — E78.00 HYPERCHOLESTEROLEMIA: ICD-10-CM

## 2017-09-07 DIAGNOSIS — R74.8 ELEVATED LIVER ENZYMES: ICD-10-CM

## 2017-09-07 LAB
ALBUMIN SERPL-MCNC: 3.8 G/DL (ref 3.5–5.2)
ALBUMIN/GLOBULIN RATIO: ABNORMAL (ref 1–2.5)
ALP BLD-CCNC: 253 U/L (ref 40–129)
ALT SERPL-CCNC: 28 U/L (ref 5–41)
AST SERPL-CCNC: 52 U/L
BILIRUB SERPL-MCNC: 0.86 MG/DL (ref 0.3–1.2)
BILIRUBIN DIRECT: 0.44 MG/DL
BILIRUBIN, INDIRECT: 0.42 MG/DL (ref 0–1)
GLOBULIN: ABNORMAL G/DL (ref 1.5–3.8)
TOTAL PROTEIN: 6.8 G/DL (ref 6.4–8.3)

## 2017-09-07 PROCEDURE — 36415 COLL VENOUS BLD VENIPUNCTURE: CPT

## 2017-09-07 PROCEDURE — 80076 HEPATIC FUNCTION PANEL: CPT

## 2017-09-08 RX ORDER — SIMVASTATIN 10 MG
TABLET ORAL
Qty: 30 TABLET | Refills: 6 | Status: SHIPPED | OUTPATIENT
Start: 2017-09-08 | End: 2018-05-02 | Stop reason: SDUPTHER

## 2017-09-13 ENCOUNTER — CARE COORDINATION (OUTPATIENT)
Dept: CARE COORDINATION | Age: 82
End: 2017-09-13

## 2017-09-13 DIAGNOSIS — K59.04 CHRONIC IDIOPATHIC CONSTIPATION: ICD-10-CM

## 2017-09-13 DIAGNOSIS — K14.4 SMOOTH TONGUE: ICD-10-CM

## 2017-09-13 DIAGNOSIS — Z12.5 SCREENING FOR PROSTATE CANCER: Primary | ICD-10-CM

## 2017-09-13 RX ORDER — SENNA PLUS 8.6 MG/1
1 TABLET ORAL DAILY
COMMUNITY
End: 2019-05-13

## 2017-09-14 ENCOUNTER — CARE COORDINATION (OUTPATIENT)
Dept: CARE COORDINATION | Age: 82
End: 2017-09-14

## 2017-09-15 ENCOUNTER — HOSPITAL ENCOUNTER (OUTPATIENT)
Age: 82
Discharge: HOME | End: 2017-09-15
Payer: MEDICARE

## 2017-09-15 ENCOUNTER — TELEPHONE (OUTPATIENT)
Dept: FAMILY MEDICINE CLINIC | Age: 82
End: 2017-09-15

## 2017-09-15 DIAGNOSIS — Z12.5 SCREENING FOR PROSTATE CANCER: ICD-10-CM

## 2017-09-15 DIAGNOSIS — K14.4 SMOOTH TONGUE: ICD-10-CM

## 2017-09-15 DIAGNOSIS — R74.8 ELEVATED LIVER ENZYMES: Primary | ICD-10-CM

## 2017-09-15 DIAGNOSIS — R74.8 ELEVATED LIVER ENZYMES: ICD-10-CM

## 2017-09-15 DIAGNOSIS — K59.04 CHRONIC IDIOPATHIC CONSTIPATION: ICD-10-CM

## 2017-09-15 LAB
ALBUMIN SERPL-MCNC: 3.8 G/DL (ref 3.5–5.2)
ALBUMIN/GLOBULIN RATIO: ABNORMAL (ref 1–2.5)
ALP BLD-CCNC: 251 U/L (ref 40–129)
ALT SERPL-CCNC: 25 U/L (ref 5–41)
AST SERPL-CCNC: 48 U/L
BILIRUB SERPL-MCNC: 0.78 MG/DL (ref 0.3–1.2)
BILIRUBIN DIRECT: 0.39 MG/DL
BILIRUBIN, INDIRECT: 0.39 MG/DL (ref 0–1)
FOLATE: >20 NG/ML
GLOBULIN: ABNORMAL G/DL (ref 1.5–3.8)
MAGNESIUM: 2.3 MG/DL (ref 1.6–2.6)
PROSTATE SPECIFIC ANTIGEN: 0.4 UG/L
TOTAL PROTEIN: 6.9 G/DL (ref 6.4–8.3)
VITAMIN B-12: 524 PG/ML (ref 211–946)

## 2017-09-15 PROCEDURE — 82746 ASSAY OF FOLIC ACID SERUM: CPT

## 2017-09-15 PROCEDURE — 80076 HEPATIC FUNCTION PANEL: CPT

## 2017-09-15 PROCEDURE — G0103 PSA SCREENING: HCPCS

## 2017-09-15 PROCEDURE — 36415 COLL VENOUS BLD VENIPUNCTURE: CPT

## 2017-09-15 PROCEDURE — 82607 VITAMIN B-12: CPT

## 2017-09-15 PROCEDURE — 83735 ASSAY OF MAGNESIUM: CPT

## 2017-09-18 ENCOUNTER — OFFICE VISIT (OUTPATIENT)
Dept: FAMILY MEDICINE CLINIC | Age: 82
End: 2017-09-18
Payer: MEDICARE

## 2017-09-18 VITALS
BODY MASS INDEX: 28.16 KG/M2 | HEIGHT: 65 IN | SYSTOLIC BLOOD PRESSURE: 120 MMHG | OXYGEN SATURATION: 98 % | RESPIRATION RATE: 18 BRPM | HEART RATE: 75 BPM | DIASTOLIC BLOOD PRESSURE: 64 MMHG | WEIGHT: 169 LBS

## 2017-09-18 DIAGNOSIS — R74.8 ELEVATED LIVER ENZYMES: Primary | ICD-10-CM

## 2017-09-18 DIAGNOSIS — E03.1 CONGENITAL HYPOTHYROIDISM WITHOUT GOITER: ICD-10-CM

## 2017-09-18 DIAGNOSIS — D63.8 ANEMIA IN OTHER CHRONIC DISEASES CLASSIFIED ELSEWHERE: ICD-10-CM

## 2017-09-18 DIAGNOSIS — F41.1 ANXIETY STATE: ICD-10-CM

## 2017-09-18 DIAGNOSIS — K21.9 GASTROESOPHAGEAL REFLUX DISEASE WITHOUT ESOPHAGITIS: ICD-10-CM

## 2017-09-18 DIAGNOSIS — N18.30 CKD STAGE 3 DUE TO TYPE 2 DIABETES MELLITUS (HCC): ICD-10-CM

## 2017-09-18 DIAGNOSIS — E78.00 PURE HYPERCHOLESTEROLEMIA: ICD-10-CM

## 2017-09-18 DIAGNOSIS — E11.22 CKD STAGE 3 DUE TO TYPE 2 DIABETES MELLITUS (HCC): ICD-10-CM

## 2017-09-18 DIAGNOSIS — H54.40 BLIND RIGHT EYE: ICD-10-CM

## 2017-09-18 PROCEDURE — 4040F PNEUMOC VAC/ADMIN/RCVD: CPT | Performed by: NURSE PRACTITIONER

## 2017-09-18 PROCEDURE — 99214 OFFICE O/P EST MOD 30 MIN: CPT | Performed by: NURSE PRACTITIONER

## 2017-09-18 PROCEDURE — 1123F ACP DISCUSS/DSCN MKR DOCD: CPT | Performed by: NURSE PRACTITIONER

## 2017-09-18 PROCEDURE — G8417 CALC BMI ABV UP PARAM F/U: HCPCS | Performed by: NURSE PRACTITIONER

## 2017-09-18 PROCEDURE — 1036F TOBACCO NON-USER: CPT | Performed by: NURSE PRACTITIONER

## 2017-09-18 PROCEDURE — G8427 DOCREV CUR MEDS BY ELIG CLIN: HCPCS | Performed by: NURSE PRACTITIONER

## 2017-09-18 RX ORDER — ALBUTEROL SULFATE 90 UG/1
2 AEROSOL, METERED RESPIRATORY (INHALATION) EVERY 6 HOURS PRN
Qty: 3 INHALER | Refills: 3 | Status: SHIPPED | OUTPATIENT
Start: 2017-09-18 | End: 2019-03-06 | Stop reason: SDUPTHER

## 2017-09-18 ASSESSMENT — PATIENT HEALTH QUESTIONNAIRE - PHQ9
1. LITTLE INTEREST OR PLEASURE IN DOING THINGS: 0
SUM OF ALL RESPONSES TO PHQ QUESTIONS 1-9: 0
2. FEELING DOWN, DEPRESSED OR HOPELESS: 0
SUM OF ALL RESPONSES TO PHQ9 QUESTIONS 1 & 2: 0

## 2017-09-21 ASSESSMENT — ENCOUNTER SYMPTOMS
CONSTIPATION: 1
COUGH: 0
EYES NEGATIVE: 1
RHINORRHEA: 0
BACK PAIN: 1
WHEEZING: 0
SHORTNESS OF BREATH: 0
CHEST TIGHTNESS: 0
BLOOD IN STOOL: 0
ABDOMINAL DISTENTION: 1

## 2017-09-22 ENCOUNTER — CARE COORDINATION (OUTPATIENT)
Dept: CARE COORDINATION | Age: 82
End: 2017-09-22

## 2017-09-22 ASSESSMENT — ENCOUNTER SYMPTOMS: DYSPNEA ASSOCIATED WITH: EXERTION

## 2017-10-06 ENCOUNTER — CARE COORDINATION (OUTPATIENT)
Dept: CARE COORDINATION | Age: 82
End: 2017-10-06

## 2017-10-06 ASSESSMENT — ENCOUNTER SYMPTOMS: DYSPNEA ASSOCIATED WITH: EXERTION

## 2017-10-06 NOTE — CARE COORDINATION
Ambulatory Care Coordination Note  10/6/2017  CM Risk Score: 4  Mana Mortality Risk Score: 19.42    ACC: Gus Herrmann, RN    Summary Note: Phone call to patient. Patient is in good spirits and talkative. Patient reports he has shortness of breath on exertion and he takes Albubuterol inhaler 2 puffs at bedtime. Encouraged patient to rinse mouth after every use and also explained the inhaler is ordered as needed for shortness of breath so he may not need to take it every night. Patient verbalizes understanding. Patient states his blood sugar was 114 this morning, so he did not take any glipizide. Patient states he has returned to his Sennoside([employer]) qod and his bowels have returned to normal.  Bowels are soft, but not watery at this time. Patient denies having any chest pain. Patient states his right leg has healed and he no longer has any redness or bumps present. Confirmed patient's appointment with Irasema Marquis for 10/16/2017 1:30. Patient states he will need the [employer] to bring him. Patient states he will come early to have the labs drawn that Carmelita Hunter ordered. Confirm with Irasema Marquis CNP that patient can have labs immediately prior to appointment on 10/16/2017. Phone to centralized scheduling-van  at 12:30 on 10/16/2017 to allow time for lab draw. Phone [facility] to patient [facility]  at 12;30 10/16/2017 to allow time for labs prior to appointment. Patient verbalizes understanding. CC plan: Will follow up with patient after appointment with KANWAL Connell CNP. Diabetes Assessment    Medic Alert ID:  No   Meal Planning:  Avoidance of concentrated sweets   How often do you test your blood sugar?:  Daily   Do you have barriers with adherence to non-pharmacologic self-management interventions?  (Nutrition/Exercise/Self-Monitoring):  No   Have you ever had to go to the ED for symptoms of low blood sugar?:  No      No patient-reported symptoms   Do you have hyperglycemia symptoms?:  No   Do you have hypoglycemia symptoms?:  No   Last Blood Sugar Value:  114   Blood Sugar Monitoring Regimen:  Once a Day   Blood Sugar Trends:  No Change          and   COPD Assessment    Does the patient understand envrionmental exposure?:  Yes   Is the patient able to verbalize Rescue vs. Long Acting medications?:  Yes   Does the patient have a nebulizer?:  No   Does the patient use a space with inhaled medications?:  No         No patient-reported symptoms         Symptoms:   None:  Yes         Symptom course:  stable   Breathlessness:  exertion   Increase use of rapid acting/rescue inhaled medications?:  No   Change in chronic cough?:  No/At Baseline   Change in sputum?:  No/At Baseline   Self Monitoring - SaO2:  No   Have you had a recent diagnosis of pneumonia either by PCP or at a hospital?:  No           .         Care Coordination Interventions    Program Enrollment:  Rising Risk   Referral from Primary Care Provider:  No   Suggested Interventions and [place]:  Declined   Transportation Support:  Completed   Zone Management Tools:  Completed             Goals Addressed     None          Prior to Admission medications    Medication Sig Start Date End Date Taking? Authorizing Provider   NONFORMULARY Indications: Milk of magnesia. Historical Provider, MD   albuterol sulfate HFA (PROVENTIL HFA) 108 (90 Base) MCG/ACT inhaler Inhale 2 puffs into the lungs every 6 hours as needed for Wheezing 9/18/17   Jolynn Hatfield, NP   senna (SENOKOT) 8.6 MG tablet Take 1 tablet by mouth daily Patient takes 1 tablet daily and additional pill PRN.     Historical Provider, MD   simvastatin (ZOCOR) 10 MG tablet take 1 tablet by mouth at bedtime 9/8/17   Geovanna Mejias DO   glipiZIDE (GLUCOTROL) 10 MG tablet take 1 tablet by mouth once daily 8/23/17   Geovanna Mejias DO   pantoprazole (PROTONIX) 40 MG tablet take 1 tablet by mouth daily 8/22/17   Geovanna Mejias DO   thyroid (ARMOUR THYROID) 15 MG tablet Take 1 tablet by mouth daily 8/8/17   Cornelio Grams, DO   clonazePAM (KLONOPIN) 0.5 MG tablet Take 1/2 tablet OR 1 tablet nightly as needed for anxiety 8/7/17   Diego Marmolejo NP   Blood Glucose Monitoring Suppl ENRIKE 1 Units by Does not apply route once for 1 dose 2/6/17 8/2/17  Diego Marmolejo NP   glucose blood VI test strips (EXACTECH TEST) strip 1 each by In Vitro route daily As needed. 2/6/17   Diego Marmolejo NP   Lancets MISC Please dispense lancets to go with his meter   Tests daily   DX E11.9 2/6/17   Diego Marmolejo NP   NONFORMULARY Take 1 tablet by mouth as needed (Leg cramp relief) Indications: Leg Cramps José's leg cramp relief 50 quick-dissolving tablets, homeopathic. Historical Provider, MD   aspirin 81 MG chewable tablet Take 81 mg by mouth daily.       Historical Provider, MD       Future Appointments  Date Time Provider Mai Gutierrez   10/16/2017 1:30 PM ANSHUL [facility]eger [facility]   3/12/2018 1:30 PM [facility]

## 2017-10-16 ENCOUNTER — IMMUNIZATION (OUTPATIENT)
Dept: FAMILY MEDICINE CLINIC | Age: 82
End: 2017-10-16
Payer: MEDICARE

## 2017-10-16 ENCOUNTER — HOSPITAL ENCOUNTER (OUTPATIENT)
Age: 82
Discharge: HOME | End: 2017-10-16
Payer: MEDICARE

## 2017-10-16 DIAGNOSIS — D63.8 ANEMIA IN OTHER CHRONIC DISEASES CLASSIFIED ELSEWHERE: ICD-10-CM

## 2017-10-16 DIAGNOSIS — E03.1 CONGENITAL HYPOTHYROIDISM WITHOUT GOITER: ICD-10-CM

## 2017-10-16 DIAGNOSIS — R74.8 ELEVATED LIVER ENZYMES: ICD-10-CM

## 2017-10-16 LAB
ABSOLUTE EOS #: 0.2 K/UL (ref 0–0.4)
ABSOLUTE LYMPH #: 2 K/UL (ref 1–4.8)
ABSOLUTE MONO #: 0.4 K/UL (ref 0–1)
ALBUMIN SERPL-MCNC: 3.9 G/DL (ref 3.5–5.2)
ALBUMIN/GLOBULIN RATIO: ABNORMAL (ref 1–2.5)
ALP BLD-CCNC: 189 U/L (ref 40–129)
ALT SERPL-CCNC: 16 U/L (ref 5–41)
AST SERPL-CCNC: 32 U/L
BASOPHILS # BLD: 0 %
BASOPHILS ABSOLUTE: 0 K/UL (ref 0–0.2)
BILIRUB SERPL-MCNC: 0.47 MG/DL (ref 0.3–1.2)
BILIRUBIN DIRECT: 0.21 MG/DL
BILIRUBIN, INDIRECT: 0.26 MG/DL (ref 0–1)
DIFFERENTIAL TYPE: YES
EOSINOPHILS RELATIVE PERCENT: 4 %
GLOBULIN: ABNORMAL G/DL (ref 1.5–3.8)
HCT VFR BLD CALC: 33.7 % (ref 41–53)
HEMOGLOBIN: 11.3 G/DL (ref 13.5–17.5)
LYMPHOCYTES # BLD: 43 %
MCH RBC QN AUTO: 30.8 PG (ref 26–34)
MCHC RBC AUTO-ENTMCNC: 33.5 G/DL (ref 31–37)
MCV RBC AUTO: 91.9 FL (ref 80–100)
MONOCYTES # BLD: 9 %
PDW BLD-RTO: 13.1 % (ref 12.1–15.2)
PLATELET # BLD: 143 K/UL (ref 140–450)
PLATELET ESTIMATE: ABNORMAL
PMV BLD AUTO: ABNORMAL FL (ref 6–12)
RBC # BLD: 3.67 M/UL (ref 4.5–5.9)
RBC # BLD: ABNORMAL 10*6/UL
SEG NEUTROPHILS: 44 %
SEGMENTED NEUTROPHILS ABSOLUTE COUNT: 2 K/UL (ref 2.1–6.5)
TOTAL PROTEIN: 6.8 G/DL (ref 6.4–8.3)
TSH SERPL DL<=0.05 MIU/L-ACNC: 2.82 MIU/L (ref 0.3–5)
WBC # BLD: 4.6 K/UL (ref 3.5–11)
WBC # BLD: ABNORMAL 10*3/UL

## 2017-10-16 PROCEDURE — 85025 COMPLETE CBC W/AUTO DIFF WBC: CPT

## 2017-10-16 PROCEDURE — 84443 ASSAY THYROID STIM HORMONE: CPT

## 2017-10-16 PROCEDURE — G0008 ADMIN INFLUENZA VIRUS VAC: HCPCS | Performed by: NURSE PRACTITIONER

## 2017-10-16 PROCEDURE — 36415 COLL VENOUS BLD VENIPUNCTURE: CPT

## 2017-10-16 PROCEDURE — 90686 IIV4 VACC NO PRSV 0.5 ML IM: CPT | Performed by: NURSE PRACTITIONER

## 2017-10-16 PROCEDURE — 80076 HEPATIC FUNCTION PANEL: CPT

## 2017-10-16 RX ORDER — LEVOTHYROXINE AND LIOTHYRONINE 9.5; 2.25 UG/1; UG/1
15 TABLET ORAL DAILY
Qty: 30 TABLET | Refills: 5 | Status: SHIPPED | OUTPATIENT
Start: 2017-10-16 | End: 2017-10-17 | Stop reason: SDUPTHER

## 2017-10-16 RX ORDER — CLONAZEPAM 0.5 MG/1
TABLET ORAL
Qty: 30 TABLET | Refills: 1 | Status: SHIPPED | OUTPATIENT
Start: 2017-10-16 | End: 2017-10-17 | Stop reason: SDUPTHER

## 2017-10-17 RX ORDER — LEVOTHYROXINE AND LIOTHYRONINE 9.5; 2.25 UG/1; UG/1
15 TABLET ORAL DAILY
Qty: 30 TABLET | Refills: 5 | Status: SHIPPED | OUTPATIENT
Start: 2017-10-17 | End: 2018-06-12 | Stop reason: SDUPTHER

## 2017-10-17 RX ORDER — CLONAZEPAM 0.5 MG/1
TABLET ORAL
Qty: 30 TABLET | Refills: 1 | Status: SHIPPED | OUTPATIENT
Start: 2017-10-17 | End: 2018-01-19 | Stop reason: SDUPTHER

## 2017-10-31 ENCOUNTER — CARE COORDINATION (OUTPATIENT)
Dept: CARE COORDINATION | Age: 82
End: 2017-10-31

## 2017-10-31 NOTE — CARE COORDINATION
Ambulatory Care Coordination Note  10/31/2017  CM Risk Score: 4  Mana Mortality Risk Score: 19.42    ACC: Barbara Wheeler RN    Summary Note: Phone call to patient. Patient is in good spirits, talkative and very alert today. Patient reports he had his follow up with Alexander Miranda CNP, and his labs were \"Much improved. \"   Reminded patient of next appointment with Santiago Frost 3/12/2018. [facility]. Reviewed need for Pneumonia vaccine and DTap. Patient refuses these immunizations at this time, stating \"I just don't think that I need them. \"  This nurse reviewed the benefits of having immunizations--patient verbalizes understanding, but states he will not take the immunizations. Reviewed diabetes zone management-when to call PCP. Reviewed importance of diet and exercise to control blood sugars. Reviewed COPD--Patient takes 1-2 puffs of Albuterol every night before bed. Reviewed use of this rescue inhaler and informed patient that he would not need to take it all of the time. Patient states he sleeps best when he takes the inhaler before bed, along with 1/2 tab Cloanazepam.  Discussed different ways to relax and prepare for bed with patient. Patient states \"This is working, so I will just continue. \"    CC plan: Will follow up with patient in one month to assess blood sugars, COPD and care coordination needs. Mailed:  \"What can I eat\" money savings tips handout and the phone number for [employer] printed in large bold letters. Diabetes Assessment    Medic Alert ID:  No  Meal Planning:  Avoidance of concentrated sweets   How often do you test your blood sugar?:  Daily   Do you have barriers with adherence to non-pharmacologic self-management interventions?  (Nutrition/Exercise/Self-Monitoring):  No   Have you ever had to go to the ED for symptoms of low blood sugar?:  No       No patient-reported symptoms   Do you have hyperglycemia symptoms?:  No   Do you have

## 2017-12-05 ENCOUNTER — CARE COORDINATION (OUTPATIENT)
Dept: CARE COORDINATION | Age: 82
End: 2017-12-05

## 2017-12-05 ASSESSMENT — ENCOUNTER SYMPTOMS: DYSPNEA ASSOCIATED WITH: EXERTION

## 2017-12-05 NOTE — CARE COORDINATION
Ambulatory Care Coordination Note  12/5/2017  CM Risk Score: 4  Mana Mortality Risk Score: 19.42    ACC: Consuelo Trujillo, RN    Summary Note: Phone call to patient. Patient is in good spirits--reports he continues to meet his friends daily for coffee and fellowship. Patient reports his blood sugars have been stable at 108-126 fasting blood sugars. Patient states he does take 1/2 glipizide every day to help control blood sugars. Reviewed the importance of following low carbohydrate diet and avoiding sweets with patient. Patient states he does eat low carbohydrate diet and vegetables, but states he does eat ice cream several nights per week as a snack. Patient is not willing to change this habit as he reports he enjoys this treat. Patient reports he has not had any cough or shortness of breath. Reviewed ways to prevent infection and COPD zone management with patient. Patient confirmed he did receive the mailing regarding \"What can I eat\" and the phone number for [facility]. Reviewed the information on the handout and patient denies having any questions at this time. Patient denies need for home care services and reports he is independent with ADL's. Patient spent Thanksgiving with his daughter and family and reports he will spend Melissa with family as well. Patient reports good support from family and friends. CC plan: Will follow up with patient in 3-4 weeks to assess diabetes and COPD. Diabetes Assessment    Medic Alert ID:  No  Meal Planning:  Avoidance of concentrated sweets   How often do you test your blood sugar?:  Daily   Do you have barriers with adherence to non-pharmacologic self-management interventions?  (Nutrition/Exercise/Self-Monitoring):  No   Have you ever had to go to the ED for symptoms of low blood sugar?:  No       No patient-reported symptoms   Do you have hyperglycemia symptoms?:  No   Do you have hypoglycemia symptoms?:  No   Last Blood Sugar Value:  114 Blood Sugar Monitoring Regimen:  Once a Day   Blood Sugar Trends:  No Change       and   COPD Assessment    Does the patient understand envrionmental exposure?:  Yes  Is the patient able to verbalize Rescue vs. Long Acting medications?:  Yes  Does the patient have a nebulizer?:  No  Does the patient use a space with inhaled medications?:  No     No patient-reported symptoms         Symptoms:   None:  Yes      Symptom course:  stable  Breathlessness:  exertion  Increase use of rapid acting/rescue inhaled medications?:  No  Change in chronic cough?:  No/At Baseline  Change in sputum?:  No/At Baseline  Self Monitoring - SaO2:  No  Have you had a recent diagnosis of pneumonia either by PCP or at a hospital?:  No           Care Coordination Interventions    Program Enrollment:  Rising Risk  Referral from Primary Care Provider:  No  Suggested Interventions and [address] [place] [address] Hwy:  Declined  Transportation Support:  Completed  Zone Management Tools:  Completed         Goals Addressed             Most Recent     Conditions and Symptoms   On track (12/5/2017)             I will schedule office visits, as directed by my provider. I will keep my appointment or reschedule if I have to cancel. I will follow my Zone Management tool to seek urgent or emergent care. I will notify my provider of any symptoms that indicate a worsening of my condition. Barriers: impairment:  Visual, transportation  Plan for overcoming my barriers: Patient will use the [employer] for transportation as needed, patient will call the PCP or RN Care coordinator for assistance with setting up transportation or if he has questions regarding appointment date/time. Confidence: 8/10  Anticipated Goal Completion Date: 11/30/2017              Prior to Admission medications    Medication Sig Start Date End Date Taking?  Authorizing Provider   clonazePAM (KLONOPIN) 0.5 MG tablet Take 1/2 tablet OR 1 tablet nightly as needed for anxiety 10/17/17   Enrrique Ram NP   thyroid [facility] THYROID) 15 MG tablet Take 1 tablet by mouth daily 10/17/17   Enrrique Ram NP   NONFORMULARY Indications: Milk of magnesia. Historical Provider, MD   albuterol sulfate HFA (PROVENTIL HFA) 108 (90 Base) MCG/ACT inhaler Inhale 2 puffs into the lungs every 6 hours as needed for Wheezing 9/18/17   Enrrique Ram NP   senna (SENOKOT) 8.6 MG tablet Take 1 tablet by mouth daily Patient takes 1 tablet daily and additional pill PRN. Historical Provider, MD   simvastatin (ZOCOR) 10 MG tablet take 1 tablet by mouth at bedtime 9/8/17   [facility], DO   glipiZIDE (GLUCOTROL) 10 MG tablet take 1 tablet by mouth once daily 8/23/17   [facility], DO   pantoprazole (PROTONIX) 40 MG tablet take 1 tablet by mouth daily 8/22/17   [facility], DO   Blood Glucose Monitoring Suppl ENRIKE 1 Units by Does not apply route once for 1 dose 2/6/17 8/2/17  Enrrique Ram NP   glucose blood VI test strips (EXACTECH TEST) strip 1 each by In Vitro route daily As needed. 2/6/17   Enrrique Ram NP   Lancets MISC Please dispense lancets to go with his meter   Tests daily   DX E11.9 2/6/17   Enrrique Ram NP   NONFORMULARY Take 1 tablet by mouth as needed (Leg cramp relief) Indications: Leg Cramps José's leg cramp relief 50 quick-dissolving tablets, homeopathic. Historical Provider, MD   aspirin 81 MG chewable tablet Take 81 mg by mouth daily.       Historical Provider, MD       Future Appointments  Date Time Provider Mai Brenda   3/12/2018 1:30 PM Enrrique Ram NP [facility]

## 2018-01-10 ENCOUNTER — CARE COORDINATION (OUTPATIENT)
Dept: CARE COORDINATION | Age: 83
End: 2018-01-10

## 2018-01-10 ASSESSMENT — ENCOUNTER SYMPTOMS: DYSPNEA ASSOCIATED WITH: EXERTION

## 2018-01-19 ENCOUNTER — TELEPHONE (OUTPATIENT)
Dept: FAMILY MEDICINE CLINIC | Age: 83
End: 2018-01-19

## 2018-01-19 DIAGNOSIS — F41.9 ANXIETY: Primary | ICD-10-CM

## 2018-01-19 RX ORDER — CLONAZEPAM 0.5 MG/1
TABLET ORAL
Qty: 30 TABLET | Refills: 1 | Status: SHIPPED | OUTPATIENT
Start: 2018-01-19 | End: 2018-04-02 | Stop reason: SDUPTHER

## 2018-02-01 ENCOUNTER — TELEPHONE (OUTPATIENT)
Dept: FAMILY MEDICINE CLINIC | Age: 83
End: 2018-02-01

## 2018-02-01 DIAGNOSIS — E11.9 CONTROLLED TYPE 2 DIABETES MELLITUS WITHOUT COMPLICATION, WITHOUT LONG-TERM CURRENT USE OF INSULIN (HCC): ICD-10-CM

## 2018-02-02 RX ORDER — LANCETS 30 GAUGE
EACH MISCELLANEOUS
Qty: 100 EACH | Refills: 3 | Status: SHIPPED | OUTPATIENT
Start: 2018-02-02 | End: 2018-03-26 | Stop reason: SDUPTHER

## 2018-02-05 ENCOUNTER — CARE COORDINATION (OUTPATIENT)
Dept: CARE COORDINATION | Age: 83
End: 2018-02-05

## 2018-02-05 RX ORDER — GLIPIZIDE 10 MG/1
TABLET ORAL
Qty: 90 TABLET | Refills: 1 | Status: SHIPPED | OUTPATIENT
Start: 2018-02-05 | End: 2018-06-13 | Stop reason: ALTCHOICE

## 2018-02-05 RX ORDER — PANTOPRAZOLE SODIUM 40 MG/1
TABLET, DELAYED RELEASE ORAL
Qty: 90 TABLET | Refills: 1 | Status: SHIPPED | OUTPATIENT
Start: 2018-02-05 | End: 2018-03-12 | Stop reason: SDUPTHER

## 2018-02-05 ASSESSMENT — ENCOUNTER SYMPTOMS: DYSPNEA ASSOCIATED WITH: EXERTION

## 2018-02-05 NOTE — CARE COORDINATION
Ambulatory Care Coordination Note  2/5/2018  CM Risk Score: 4  Mana Mortality Risk Score: 19.42    ACC: Tres Castaneda RN    Summary Note: Phone call to patient. Patient denies having any recent infections or falls. Patient states his blood sugars are stable and he is only taking Glipizide if blood sugars >140. Patient states he did not  his test strips yet, but he plans on picking up today at [facility]. This nurse noted the prescription was sent to [facility]. Patient states he gets all of his medications from [facility], but his test strips he always gets from [facility]. Advised patient to call [facility] and have prescription transferred to [facility]. Patient verbalizes understanding and states he will call [facility] now. Patient states he is taking Albuterol 1-2 times per day as needed for shortness of breath or wheezing. Patient states he is at baseline with no new shortness of breath. Patient is in good spirits and talkative. Discussed with patient that it is best to stay away from anyone with flu-like symptoms/respiratory infections and to stay away from the public as much as possible right now. Discussed frequent hand washing and the importance of getting annual flu shot. Discussed the positive impact of prompt treatment of symptoms with antiviral medication if patient becomes infected. Patient verbalizes understanding. See CC Education. CC plan: Will follow up with patient in 2-3 weeks. Diabetes Assessment    Medic Alert ID:  No  Meal Planning:  Avoidance of concentrated sweets   How often do you test your blood sugar?:  Daily   Do you have barriers with adherence to non-pharmacologic self-management interventions?  (Nutrition/Exercise/Self-Monitoring):  No   Have you ever had to go to the ED for symptoms of low blood sugar?:  No       Do you have hyperglycemia symptoms?:  No   Do you have hypoglycemia symptoms?:  No   Last Blood Sugar Value:  135   Blood Sugar Monitoring Regimen:  Once a Day   Blood Sugar Trends:  No Change       and   COPD Assessment    Does the patient understand envrionmental exposure?:  Yes  Is the patient able to verbalize Rescue vs. Long Acting medications?:  Yes  Does the patient have a nebulizer?:  No  Does the patient use a space with inhaled medications?:  No            Symptoms:                 Care Coordination Interventions    Program Enrollment:  Rising Risk  Referral from Primary Care Provider:  No  Suggested Interventions and [address]:  Declined  Transportation Support:  Completed  Zone Management Tools:  Completed         Goals Addressed     None          Prior to Admission medications    Medication Sig Start Date End Date Taking? Authorizing Provider   glucose blood VI test strips (EXACTECH TEST) strip 1 each by In Vitro route daily As needed. 2/2/18   Kyra Garsia NP   Lancets MISC Testing bid 2/2/18   Kyra Garsia NP   clonazePAM (KLONOPIN) 0.5 MG tablet Take 1/2 tablet OR 1 tablet nightly as needed for anxiety. 1/19/18 2/19/18  Kyra Garsia NP   thyroid [facility] THYROID) 15 MG tablet Take 1 tablet by mouth daily 10/17/17   Kyra Garsia NP   NONFORMULARY Indications: Milk of magnesia. Historical Provider, MD   albuterol sulfate HFA (PROVENTIL HFA) 108 (90 Base) MCG/ACT inhaler Inhale 2 puffs into the lungs every 6 hours as needed for Wheezing 9/18/17   Kyra Garsia NP   senna (SENOKOT) 8.6 MG tablet Take 1 tablet by mouth daily Patient takes 1 tablet daily and additional pill PRN.     Historical Provider, MD   simvastatin (ZOCOR) 10 MG tablet take 1 tablet by mouth at bedtime 9/8/17   Nia Anthony, DO   glipiZIDE (GLUCOTROL) 10 MG tablet take 1 tablet by mouth once daily 8/23/17   Nia Anthony, DO   pantoprazole (PROTONIX) 40 MG tablet take 1 tablet by mouth daily 8/22/17   Nia Anthony, DO   Blood Glucose Monitoring Suppl ENRIKE 1 Units by Does not apply route once for 1 dose 2/6/17 8/2/17

## 2018-02-07 ENCOUNTER — CARE COORDINATION (OUTPATIENT)
Dept: CARE COORDINATION | Age: 83
End: 2018-02-07

## 2018-03-12 ENCOUNTER — HOSPITAL ENCOUNTER (OUTPATIENT)
Age: 83
Discharge: HOME | End: 2018-03-12
Payer: MEDICARE

## 2018-03-12 ENCOUNTER — OFFICE VISIT (OUTPATIENT)
Dept: FAMILY MEDICINE CLINIC | Age: 83
End: 2018-03-12
Payer: MEDICARE

## 2018-03-12 VITALS
WEIGHT: 170 LBS | HEART RATE: 58 BPM | BODY MASS INDEX: 28.32 KG/M2 | OXYGEN SATURATION: 98 % | HEIGHT: 65 IN | SYSTOLIC BLOOD PRESSURE: 130 MMHG | RESPIRATION RATE: 18 BRPM | DIASTOLIC BLOOD PRESSURE: 70 MMHG

## 2018-03-12 DIAGNOSIS — K21.9 GASTROESOPHAGEAL REFLUX DISEASE WITHOUT ESOPHAGITIS: ICD-10-CM

## 2018-03-12 DIAGNOSIS — F41.1 ANXIETY STATE: ICD-10-CM

## 2018-03-12 DIAGNOSIS — H54.40 BLIND RIGHT EYE: ICD-10-CM

## 2018-03-12 DIAGNOSIS — Z79.4 TYPE 2 DIABETES MELLITUS WITH STAGE 2 CHRONIC KIDNEY DISEASE, WITH LONG-TERM CURRENT USE OF INSULIN (HCC): ICD-10-CM

## 2018-03-12 DIAGNOSIS — Z12.5 PROSTATE CANCER SCREENING: ICD-10-CM

## 2018-03-12 DIAGNOSIS — E78.00 PURE HYPERCHOLESTEROLEMIA: ICD-10-CM

## 2018-03-12 DIAGNOSIS — E11.22 TYPE 2 DIABETES MELLITUS WITH STAGE 2 CHRONIC KIDNEY DISEASE, WITH LONG-TERM CURRENT USE OF INSULIN (HCC): ICD-10-CM

## 2018-03-12 DIAGNOSIS — I10 ESSENTIAL HYPERTENSION, BENIGN: ICD-10-CM

## 2018-03-12 DIAGNOSIS — R30.0 DYSURIA: ICD-10-CM

## 2018-03-12 DIAGNOSIS — E11.9 TYPE 2 DIABETES MELLITUS WITHOUT COMPLICATION, WITHOUT LONG-TERM CURRENT USE OF INSULIN (HCC): ICD-10-CM

## 2018-03-12 DIAGNOSIS — E03.9 ACQUIRED HYPOTHYROIDISM: ICD-10-CM

## 2018-03-12 DIAGNOSIS — E11.22 CKD STAGE 3 DUE TO TYPE 2 DIABETES MELLITUS (HCC): ICD-10-CM

## 2018-03-12 DIAGNOSIS — E11.9 TYPE 2 DIABETES MELLITUS WITHOUT COMPLICATION, WITHOUT LONG-TERM CURRENT USE OF INSULIN (HCC): Primary | ICD-10-CM

## 2018-03-12 DIAGNOSIS — J44.9 CHRONIC OBSTRUCTIVE PULMONARY DISEASE, UNSPECIFIED COPD TYPE (HCC): ICD-10-CM

## 2018-03-12 DIAGNOSIS — N18.2 TYPE 2 DIABETES MELLITUS WITH STAGE 2 CHRONIC KIDNEY DISEASE, WITH LONG-TERM CURRENT USE OF INSULIN (HCC): ICD-10-CM

## 2018-03-12 DIAGNOSIS — N18.30 CKD STAGE 3 DUE TO TYPE 2 DIABETES MELLITUS (HCC): ICD-10-CM

## 2018-03-12 DIAGNOSIS — E55.9 VITAMIN D DEFICIENCY: ICD-10-CM

## 2018-03-12 PROBLEM — K80.51 CALCULUS OF BILE DUCT WITHOUT CHOLECYSTITIS WITH OBSTRUCTION: Status: RESOLVED | Noted: 2017-08-02 | Resolved: 2018-03-12

## 2018-03-12 LAB
-: NORMAL
ALBUMIN SERPL-MCNC: 3.9 G/DL (ref 3.5–5.2)
ALBUMIN/GLOBULIN RATIO: ABNORMAL (ref 1–2.5)
ALP BLD-CCNC: 102 U/L (ref 40–129)
ALT SERPL-CCNC: 11 U/L (ref 5–41)
AMORPHOUS: NORMAL
ANION GAP SERPL CALCULATED.3IONS-SCNC: 10 MMOL/L (ref 9–17)
AST SERPL-CCNC: 25 U/L
BACTERIA: NORMAL
BILIRUB SERPL-MCNC: 0.42 MG/DL (ref 0.3–1.2)
BILIRUBIN URINE: NEGATIVE
BUN BLDV-MCNC: 17 MG/DL (ref 8–23)
BUN/CREAT BLD: 11 (ref 9–20)
CALCIUM SERPL-MCNC: 9.2 MG/DL (ref 8.6–10.4)
CASTS UA: NORMAL /LPF
CHLORIDE BLD-SCNC: 97 MMOL/L (ref 98–107)
CO2: 28 MMOL/L (ref 20–31)
COLOR: YELLOW
COMMENT UA: ABNORMAL
CREAT SERPL-MCNC: 1.55 MG/DL (ref 0.7–1.2)
CREATININE URINE: 72.5 MG/DL (ref 39–259)
CRYSTALS, UA: NORMAL /HPF
EPITHELIAL CELLS UA: NORMAL /HPF
GFR AFRICAN AMERICAN: 52 ML/MIN
GFR NON-AFRICAN AMERICAN: 43 ML/MIN
GFR SERPL CREATININE-BSD FRML MDRD: ABNORMAL ML/MIN/{1.73_M2}
GFR SERPL CREATININE-BSD FRML MDRD: ABNORMAL ML/MIN/{1.73_M2}
GLUCOSE BLD-MCNC: 191 MG/DL (ref 70–99)
GLUCOSE URINE: NEGATIVE
HBA1C MFR BLD: 7.9 %
KETONES, URINE: NEGATIVE
LEUKOCYTE ESTERASE, URINE: NEGATIVE
MICROALBUMIN/CREAT 24H UR: 157 MG/L
MICROALBUMIN/CREAT UR-RTO: 217 MCG/MG CREAT
MUCUS: NORMAL
NITRITE, URINE: NEGATIVE
OTHER OBSERVATIONS UA: NORMAL
PH UA: 6 (ref 5–8)
POTASSIUM SERPL-SCNC: 3.7 MMOL/L (ref 3.7–5.3)
PROTEIN UA: ABNORMAL
RBC UA: NORMAL /HPF (ref 0–2)
RENAL EPITHELIAL, UA: NORMAL /HPF
SODIUM BLD-SCNC: 135 MMOL/L (ref 135–144)
SPECIFIC GRAVITY UA: 1.01 (ref 1–1.03)
TOTAL PROTEIN: 6.7 G/DL (ref 6.4–8.3)
TRICHOMONAS: NORMAL
TURBIDITY: CLEAR
URINE HGB: NEGATIVE
UROBILINOGEN, URINE: NORMAL
WBC UA: NORMAL /HPF
YEAST: NORMAL

## 2018-03-12 PROCEDURE — 4040F PNEUMOC VAC/ADMIN/RCVD: CPT | Performed by: NURSE PRACTITIONER

## 2018-03-12 PROCEDURE — 36415 COLL VENOUS BLD VENIPUNCTURE: CPT

## 2018-03-12 PROCEDURE — 82570 ASSAY OF URINE CREATININE: CPT

## 2018-03-12 PROCEDURE — 81003 URINALYSIS AUTO W/O SCOPE: CPT | Performed by: NURSE PRACTITIONER

## 2018-03-12 PROCEDURE — G8417 CALC BMI ABV UP PARAM F/U: HCPCS | Performed by: NURSE PRACTITIONER

## 2018-03-12 PROCEDURE — 83036 HEMOGLOBIN GLYCOSYLATED A1C: CPT | Performed by: NURSE PRACTITIONER

## 2018-03-12 PROCEDURE — G8427 DOCREV CUR MEDS BY ELIG CLIN: HCPCS | Performed by: NURSE PRACTITIONER

## 2018-03-12 PROCEDURE — G8926 SPIRO NO PERF OR DOC: HCPCS | Performed by: NURSE PRACTITIONER

## 2018-03-12 PROCEDURE — 80053 COMPREHEN METABOLIC PANEL: CPT

## 2018-03-12 PROCEDURE — 99214 OFFICE O/P EST MOD 30 MIN: CPT | Performed by: NURSE PRACTITIONER

## 2018-03-12 PROCEDURE — 1036F TOBACCO NON-USER: CPT | Performed by: NURSE PRACTITIONER

## 2018-03-12 PROCEDURE — G8482 FLU IMMUNIZE ORDER/ADMIN: HCPCS | Performed by: NURSE PRACTITIONER

## 2018-03-12 PROCEDURE — 81001 URINALYSIS AUTO W/SCOPE: CPT

## 2018-03-12 PROCEDURE — 1123F ACP DISCUSS/DSCN MKR DOCD: CPT | Performed by: NURSE PRACTITIONER

## 2018-03-12 PROCEDURE — 3023F SPIROM DOC REV: CPT | Performed by: NURSE PRACTITIONER

## 2018-03-12 PROCEDURE — 82043 UR ALBUMIN QUANTITATIVE: CPT

## 2018-03-12 RX ORDER — PANTOPRAZOLE SODIUM 20 MG/1
20 TABLET, DELAYED RELEASE ORAL DAILY
Qty: 90 TABLET | Refills: 1 | Status: SHIPPED | OUTPATIENT
Start: 2018-03-12 | End: 2018-09-07 | Stop reason: SDUPTHER

## 2018-03-12 ASSESSMENT — ENCOUNTER SYMPTOMS
SORE THROAT: 0
EYE PAIN: 0
CONSTIPATION: 1
TROUBLE SWALLOWING: 0
EYE REDNESS: 0
ABDOMINAL DISTENTION: 1
SHORTNESS OF BREATH: 1

## 2018-03-12 NOTE — PROGRESS NOTES
medication armour thyroid 15 mg daily.  - TSH with Reflex; Future    9. CKD stage 3 due to type 2 diabetes mellitus (HCC)  -CMP ordered    10. Vitamin D deficiency  - Vitamin D 25 Hydroxy; Future    11. Prostate cancer screening  - Discussed PSA screening and digital rectal exam. He is due 09/2018    12. Dysuria  Will R/O infection.  - UA W/REFLEX CULTURE    Labs today CMP and then further labs in April please  PFT within next month  Recheck in office in June with expectation for pneumovax 23 vaccine and wean off klonopin start. Discussed incisional hernias truss belt, s/s incarceration does not want to see surgeon at this time. Quality & Risk Score Accuracy - MEDICARE ADVANTAGE    Visit Dx:  Type 2 diabetes mellitus with stage 2 chronic kidney disease, with long-term current use of insulin (HCC)  Stable Diabetes type 2 and complications based on lab values and symptoms. Continue present treatment plan, control of risk factors, and recheck at least yearly. Additional documentation:  labs due today, avoids NSAIDS>   Visit Dx:  Chronic obstructive pulmonary disease, unspecified COPD type ([facility] [address]  Stable based upon symptoms and exam. Continue current treatment plan and follow up at least yearly. Some shortness of breath uses albuterol inhaler feels it helps, rinses mouth after use. Likely needs PFT to see if daily inhaler besides rescue would be of benefit pt agreeable.      Last edited 03/12/18 22:14 EDT by Rebeca Burgess NP

## 2018-03-14 RX ORDER — LISINOPRIL 2.5 MG/1
2.5 TABLET ORAL DAILY
Qty: 30 TABLET | Refills: 2 | Status: SHIPPED | OUTPATIENT
Start: 2018-03-14 | End: 2018-04-20 | Stop reason: ALTCHOICE

## 2018-03-19 ENCOUNTER — CARE COORDINATION (OUTPATIENT)
Dept: CARE COORDINATION | Age: 83
End: 2018-03-19

## 2018-03-19 ASSESSMENT — ENCOUNTER SYMPTOMS: DYSPNEA ASSOCIATED WITH: EXERTION

## 2018-03-19 NOTE — CARE COORDINATION
non-pharmacologic self-management interventions? (Nutrition/Exercise/Self-Monitoring):  No   Have you ever had to go to the ED for symptoms of low blood sugar?:  No       No patient-reported symptoms   Do you have hyperglycemia symptoms?:  No   Do you have hypoglycemia symptoms?:  No   Last Blood Sugar Value:  95   Blood Sugar Monitoring Regimen:  Once a Day   Blood Sugar Trends:  No Change      ,   COPD Assessment    Does the patient understand envrionmental exposure?:  Yes  Is the patient able to verbalize Rescue vs. Long Acting medications?:  Yes  Does the patient have a nebulizer?:  No  Does the patient use a space with inhaled medications?:  No     No patient-reported symptoms         Symptoms:   None:  Yes      Symptom course:  stable  Breathlessness:  exertion  Increase use of rapid acting/rescue inhaled medications?:  No  Change in chronic cough?:  No/At Baseline  Change in sputum?:  No/At Baseline  Self Monitoring - SaO2:  No  Have you had a recent diagnosis of pneumonia either by PCP or at a hospital?:  No      and   General Assessment    Do you have any symptoms that are causing concern?:  No             Care Coordination Interventions    Program Enrollment:  Rising Risk  Referral from Primary Care Provider:  No  Suggested Interventions and [address] [place] [address] Hwy:  Declined  Transportation Support:  Completed  Zone Management Tools:  Completed         Goals Addressed     None          Prior to Admission medications    Medication Sig Start Date End Date Taking? Authorizing Provider   lisinopril (ZESTRIL) 2.5 MG tablet Take 1 tablet by mouth daily 3/14/18   Ifrah Ritchie NP   pantoprazole (PROTONIX) 20 MG tablet Take 1 tablet by mouth daily 3/12/18   Ifrah Ritchie NP   glipiZIDE (GLUCOTROL) 10 MG tablet take 1 tablet by mouth once daily 2/5/18   Ifrah Ritchie NP   glucose blood VI test strips (EXACTECH TEST) strip 1 each by In Vitro route daily As needed.  2/2/18   Ifrah Ritchie NP

## 2018-03-21 ENCOUNTER — TELEPHONE (OUTPATIENT)
Dept: FAMILY MEDICINE CLINIC | Age: 83
End: 2018-03-21

## 2018-03-21 NOTE — TELEPHONE ENCOUNTER
Patient called in wanting to let Zuhair Acharya known that the name of he's glucose monitor is [employer].

## 2018-03-26 ENCOUNTER — CARE COORDINATION (OUTPATIENT)
Dept: CARE COORDINATION | Age: 83
End: 2018-03-26

## 2018-03-26 DIAGNOSIS — E11.9 CONTROLLED TYPE 2 DIABETES MELLITUS WITHOUT COMPLICATION, WITHOUT LONG-TERM CURRENT USE OF INSULIN (HCC): ICD-10-CM

## 2018-03-26 RX ORDER — LANCETS 30 GAUGE
EACH MISCELLANEOUS
Qty: 100 EACH | Refills: 3 | Status: SHIPPED | OUTPATIENT
Start: 2018-03-26 | End: 2018-06-13 | Stop reason: ALTCHOICE

## 2018-03-26 NOTE — CARE COORDINATION
to check status of appt with Dietician. Diabetes Assessment    Medic Alert ID:  No  Meal Planning:  Avoidance of concentrated sweets   How often do you test your blood sugar?:  Daily   Do you have barriers with adherence to non-pharmacologic self-management interventions? (Nutrition/Exercise/Self-Monitoring):  No   Have you ever had to go to the ED for symptoms of low blood sugar?:  No       No patient-reported symptoms   Do you have hyperglycemia symptoms?:  No   Do you have hypoglycemia symptoms?:  No   Last Blood Sugar Value:  177   Blood Sugar Monitoring Regimen:  Before Meals   Blood Sugar Trends:  Fluctuating      ,   COPD Assessment    Does the patient understand envrionmental exposure?:  Yes  Is the patient able to verbalize Rescue vs. Long Acting medications?:  Yes  Does the patient have a nebulizer?:  No  Does the patient use a space with inhaled medications?:  No     No patient-reported symptoms         Symptoms:   None:  Yes      Change in chronic cough?:  No/At Baseline  Change in sputum?:  No/At Baseline  Have you had a recent diagnosis of pneumonia either by PCP or at a hospital?:  No      and   General Assessment    Do you have any symptoms that are causing concern?:  No           Care Coordination Interventions    Program Enrollment:  Rising Risk  Referral from Primary Care Provider:  No  Suggested Interventions and [address] [place] [address] Hwy:  Declined  Transportation Support:  Completed  Zone Management Tools:  Completed         Goals Addressed             Most Recent     COMPLETED: Conditions and Symptoms   On track (3/19/2018)             I will schedule office visits, as directed by my provider. I will keep my appointment or reschedule if I have to cancel. I will follow my Zone Management tool to seek urgent or emergent care. I will notify my provider of any symptoms that indicate a worsening of my condition.     Barriers: impairment:  Visual, transportation  Plan for

## 2018-03-27 ENCOUNTER — TELEPHONE (OUTPATIENT)
Dept: DIABETES SERVICES | Age: 83
End: 2018-03-27

## 2018-03-28 NOTE — PROGRESS NOTES
Spoke with Clif to schedule initial visit for diabetes education. Declines diabetes education at this time. States \"Let me get through all these tests with Rishi Yates then I will do education. \" States fasting blood sugars have been in the 90's since he stopped eating an orange in the morning. Does not take medication in the morning if his blood sugar is in the 90's. He will check blood sugar at noon and if it is high enough will take his medication at that time. If blood sugar is still too low at noon will recheck at evening meal and take it at that time. Advised not to take Glucotrol at night time since morning blood sugars have been in the 90's d/t possibility of hypoglycemia during the night. Encouraged to call with questions or concerns. Contact number given.     [facility]  Diabetes clinic educator  3/28/2018  11:49 AM

## 2018-04-02 ENCOUNTER — TELEPHONE (OUTPATIENT)
Dept: FAMILY MEDICINE CLINIC | Age: 83
End: 2018-04-02

## 2018-04-02 DIAGNOSIS — F41.9 ANXIETY: ICD-10-CM

## 2018-04-02 RX ORDER — CLONAZEPAM 0.5 MG/1
TABLET ORAL
Qty: 15 TABLET | Refills: 1 | Status: SHIPPED | OUTPATIENT
Start: 2018-04-02 | End: 2018-05-25 | Stop reason: SDUPTHER

## 2018-04-03 ENCOUNTER — HOSPITAL ENCOUNTER (OUTPATIENT)
Age: 83
Discharge: HOME | End: 2018-04-03
Payer: MEDICARE

## 2018-04-03 ENCOUNTER — CARE COORDINATION (OUTPATIENT)
Dept: CARE COORDINATION | Age: 83
End: 2018-04-03

## 2018-04-03 DIAGNOSIS — E78.00 PURE HYPERCHOLESTEROLEMIA: ICD-10-CM

## 2018-04-03 DIAGNOSIS — J44.9 CHRONIC OBSTRUCTIVE PULMONARY DISEASE, UNSPECIFIED COPD TYPE (HCC): ICD-10-CM

## 2018-04-03 DIAGNOSIS — E11.9 TYPE 2 DIABETES MELLITUS WITHOUT COMPLICATION, WITHOUT LONG-TERM CURRENT USE OF INSULIN (HCC): ICD-10-CM

## 2018-04-03 DIAGNOSIS — I10 ESSENTIAL HYPERTENSION, BENIGN: ICD-10-CM

## 2018-04-03 DIAGNOSIS — E03.9 ACQUIRED HYPOTHYROIDISM: ICD-10-CM

## 2018-04-03 DIAGNOSIS — E55.9 VITAMIN D DEFICIENCY: ICD-10-CM

## 2018-04-03 LAB
ABSOLUTE EOS #: 0.2 K/UL (ref 0–0.4)
ABSOLUTE IMMATURE GRANULOCYTE: ABNORMAL K/UL (ref 0–0.3)
ABSOLUTE LYMPH #: 2.4 K/UL (ref 1–4.8)
ABSOLUTE MONO #: 0.4 K/UL (ref 0–1)
ALBUMIN SERPL-MCNC: 3.8 G/DL (ref 3.5–5.2)
ALBUMIN/GLOBULIN RATIO: ABNORMAL (ref 1–2.5)
ALP BLD-CCNC: 90 U/L (ref 40–129)
ALT SERPL-CCNC: 11 U/L (ref 5–41)
ANION GAP SERPL CALCULATED.3IONS-SCNC: 10 MMOL/L (ref 9–17)
AST SERPL-CCNC: 26 U/L
BASOPHILS # BLD: 0 % (ref 0–2)
BASOPHILS ABSOLUTE: 0 K/UL (ref 0–0.2)
BILIRUB SERPL-MCNC: 0.56 MG/DL (ref 0.3–1.2)
BUN BLDV-MCNC: 26 MG/DL (ref 8–23)
BUN/CREAT BLD: 15 (ref 9–20)
CALCIUM SERPL-MCNC: 9.7 MG/DL (ref 8.6–10.4)
CHLORIDE BLD-SCNC: 105 MMOL/L (ref 98–107)
CHOLESTEROL/HDL RATIO: 1.9
CHOLESTEROL: 114 MG/DL
CO2: 26 MMOL/L (ref 20–31)
CREAT SERPL-MCNC: 1.73 MG/DL (ref 0.7–1.2)
DIFFERENTIAL TYPE: YES
EOSINOPHILS RELATIVE PERCENT: 4 % (ref 0–5)
ESTIMATED AVERAGE GLUCOSE: 166 MG/DL
GFR AFRICAN AMERICAN: 46 ML/MIN
GFR NON-AFRICAN AMERICAN: 38 ML/MIN
GFR SERPL CREATININE-BSD FRML MDRD: ABNORMAL ML/MIN/{1.73_M2}
GFR SERPL CREATININE-BSD FRML MDRD: ABNORMAL ML/MIN/{1.73_M2}
GLUCOSE BLD-MCNC: 108 MG/DL (ref 70–99)
HBA1C MFR BLD: 7.4 % (ref 4.8–5.9)
HCT VFR BLD CALC: 34.1 % (ref 41–53)
HDLC SERPL-MCNC: 60 MG/DL
HEMOGLOBIN: 11.5 G/DL (ref 13.5–17.5)
IMMATURE GRANULOCYTES: ABNORMAL %
LDL CHOLESTEROL: 42 MG/DL (ref 0–130)
LYMPHOCYTES # BLD: 49 % (ref 13–44)
MCH RBC QN AUTO: 31.2 PG (ref 26–34)
MCHC RBC AUTO-ENTMCNC: 33.6 G/DL (ref 31–37)
MCV RBC AUTO: 92.7 FL (ref 80–100)
MONOCYTES # BLD: 8 % (ref 5–9)
NRBC AUTOMATED: ABNORMAL PER 100 WBC
PATIENT FASTING?: YES
PDW BLD-RTO: 12.9 % (ref 12.1–15.2)
PLATELET # BLD: 156 K/UL (ref 140–450)
PLATELET ESTIMATE: ABNORMAL
PMV BLD AUTO: ABNORMAL FL (ref 6–12)
POTASSIUM SERPL-SCNC: 4.4 MMOL/L (ref 3.7–5.3)
RBC # BLD: 3.68 M/UL (ref 4.5–5.9)
RBC # BLD: ABNORMAL 10*6/UL
SEG NEUTROPHILS: 39 % (ref 39–75)
SEGMENTED NEUTROPHILS ABSOLUTE COUNT: 1.9 K/UL (ref 2.1–6.5)
SODIUM BLD-SCNC: 141 MMOL/L (ref 135–144)
TOTAL PROTEIN: 6.9 G/DL (ref 6.4–8.3)
TRIGL SERPL-MCNC: 59 MG/DL
TSH SERPL DL<=0.05 MIU/L-ACNC: 3.07 MIU/L (ref 0.3–5)
VITAMIN D 25-HYDROXY: 6.1 NG/ML (ref 30–100)
VLDLC SERPL CALC-MCNC: NORMAL MG/DL (ref 1–30)
WBC # BLD: 4.8 K/UL (ref 3.5–11)
WBC # BLD: ABNORMAL 10*3/UL

## 2018-04-03 PROCEDURE — 80061 LIPID PANEL: CPT

## 2018-04-03 PROCEDURE — 84443 ASSAY THYROID STIM HORMONE: CPT

## 2018-04-03 PROCEDURE — 80053 COMPREHEN METABOLIC PANEL: CPT

## 2018-04-03 PROCEDURE — 36415 COLL VENOUS BLD VENIPUNCTURE: CPT

## 2018-04-03 PROCEDURE — 85025 COMPLETE CBC W/AUTO DIFF WBC: CPT

## 2018-04-03 PROCEDURE — 83036 HEMOGLOBIN GLYCOSYLATED A1C: CPT

## 2018-04-03 PROCEDURE — 82306 VITAMIN D 25 HYDROXY: CPT

## 2018-04-03 ASSESSMENT — ENCOUNTER SYMPTOMS: DYSPNEA ASSOCIATED WITH: EXERTION

## 2018-04-16 ENCOUNTER — TELEPHONE (OUTPATIENT)
Dept: FAMILY MEDICINE CLINIC | Age: 83
End: 2018-04-16

## 2018-04-19 ENCOUNTER — TELEPHONE (OUTPATIENT)
Dept: DIABETES SERVICES | Age: 83
End: 2018-04-19

## 2018-04-20 ENCOUNTER — HOSPITAL ENCOUNTER (OUTPATIENT)
Dept: GENERAL RADIOLOGY | Age: 83
Discharge: HOME | End: 2018-04-22
Payer: MEDICARE

## 2018-04-20 ENCOUNTER — HOSPITAL ENCOUNTER (OUTPATIENT)
Age: 83
Discharge: HOME | End: 2018-04-20
Payer: MEDICARE

## 2018-04-20 ENCOUNTER — HOSPITAL ENCOUNTER (OUTPATIENT)
Age: 83
Discharge: HOME | End: 2018-04-22
Payer: MEDICARE

## 2018-04-20 ENCOUNTER — OFFICE VISIT (OUTPATIENT)
Dept: FAMILY MEDICINE CLINIC | Age: 83
End: 2018-04-20
Payer: MEDICARE

## 2018-04-20 VITALS
DIASTOLIC BLOOD PRESSURE: 70 MMHG | BODY MASS INDEX: 27.79 KG/M2 | SYSTOLIC BLOOD PRESSURE: 110 MMHG | HEART RATE: 64 BPM | OXYGEN SATURATION: 98 % | WEIGHT: 167 LBS

## 2018-04-20 DIAGNOSIS — M79.671 RIGHT FOOT PAIN: ICD-10-CM

## 2018-04-20 DIAGNOSIS — M79.671 RIGHT FOOT PAIN: Primary | ICD-10-CM

## 2018-04-20 LAB
ABSOLUTE EOS #: 0.2 K/UL (ref 0–0.4)
ABSOLUTE IMMATURE GRANULOCYTE: ABNORMAL K/UL (ref 0–0.3)
ABSOLUTE LYMPH #: 1.7 K/UL (ref 1–4.8)
ABSOLUTE MONO #: 0.4 K/UL (ref 0–1)
BASOPHILS # BLD: 0 % (ref 0–2)
BASOPHILS ABSOLUTE: 0 K/UL (ref 0–0.2)
DIFFERENTIAL TYPE: YES
EOSINOPHILS RELATIVE PERCENT: 4 % (ref 0–5)
HCT VFR BLD CALC: 32.3 % (ref 41–53)
HEMOGLOBIN: 11.1 G/DL (ref 13.5–17.5)
IMMATURE GRANULOCYTES: ABNORMAL %
LYMPHOCYTES # BLD: 36 % (ref 13–44)
MCH RBC QN AUTO: 31.6 PG (ref 26–34)
MCHC RBC AUTO-ENTMCNC: 34.2 G/DL (ref 31–37)
MCV RBC AUTO: 92.7 FL (ref 80–100)
MONOCYTES # BLD: 9 % (ref 5–9)
NRBC AUTOMATED: ABNORMAL PER 100 WBC
PDW BLD-RTO: 13 % (ref 12.1–15.2)
PLATELET # BLD: 134 K/UL (ref 140–450)
PLATELET ESTIMATE: ABNORMAL
PMV BLD AUTO: ABNORMAL FL (ref 6–12)
RBC # BLD: 3.49 M/UL (ref 4.5–5.9)
RBC # BLD: ABNORMAL 10*6/UL
SEG NEUTROPHILS: 51 % (ref 39–75)
SEGMENTED NEUTROPHILS ABSOLUTE COUNT: 2.5 K/UL (ref 2.1–6.5)
URIC ACID: 5.9 MG/DL (ref 3.4–7)
WBC # BLD: 4.7 K/UL (ref 3.5–11)
WBC # BLD: ABNORMAL 10*3/UL

## 2018-04-20 PROCEDURE — 84550 ASSAY OF BLOOD/URIC ACID: CPT

## 2018-04-20 PROCEDURE — 36415 COLL VENOUS BLD VENIPUNCTURE: CPT

## 2018-04-20 PROCEDURE — G8427 DOCREV CUR MEDS BY ELIG CLIN: HCPCS | Performed by: FAMILY MEDICINE

## 2018-04-20 PROCEDURE — 85025 COMPLETE CBC W/AUTO DIFF WBC: CPT

## 2018-04-20 PROCEDURE — 96372 THER/PROPH/DIAG INJ SC/IM: CPT | Performed by: FAMILY MEDICINE

## 2018-04-20 PROCEDURE — G8417 CALC BMI ABV UP PARAM F/U: HCPCS | Performed by: FAMILY MEDICINE

## 2018-04-20 PROCEDURE — 4040F PNEUMOC VAC/ADMIN/RCVD: CPT | Performed by: FAMILY MEDICINE

## 2018-04-20 PROCEDURE — 1123F ACP DISCUSS/DSCN MKR DOCD: CPT | Performed by: FAMILY MEDICINE

## 2018-04-20 PROCEDURE — 99213 OFFICE O/P EST LOW 20 MIN: CPT | Performed by: FAMILY MEDICINE

## 2018-04-20 PROCEDURE — 73630 X-RAY EXAM OF FOOT: CPT

## 2018-04-20 PROCEDURE — 1036F TOBACCO NON-USER: CPT | Performed by: FAMILY MEDICINE

## 2018-04-20 RX ORDER — PREDNISOLONE ACETATE 10 MG/ML
SUSPENSION/ DROPS OPHTHALMIC
COMMUNITY
Start: 2008-09-11 | End: 2018-06-13 | Stop reason: ALTCHOICE

## 2018-04-20 RX ORDER — TRIAMCINOLONE ACETONIDE 40 MG/ML
20 INJECTION, SUSPENSION INTRA-ARTICULAR; INTRAMUSCULAR ONCE
Status: COMPLETED | OUTPATIENT
Start: 2018-04-20 | End: 2018-04-20

## 2018-04-20 RX ADMIN — TRIAMCINOLONE ACETONIDE 20 MG: 40 INJECTION, SUSPENSION INTRA-ARTICULAR; INTRAMUSCULAR at 11:43

## 2018-04-20 ASSESSMENT — PATIENT HEALTH QUESTIONNAIRE - PHQ9
SUM OF ALL RESPONSES TO PHQ QUESTIONS 1-9: 2
1. LITTLE INTEREST OR PLEASURE IN DOING THINGS: 1
2. FEELING DOWN, DEPRESSED OR HOPELESS: 1
SUM OF ALL RESPONSES TO PHQ9 QUESTIONS 1 & 2: 2

## 2018-04-23 ENCOUNTER — CARE COORDINATION (OUTPATIENT)
Dept: CARE COORDINATION | Age: 83
End: 2018-04-23

## 2018-04-24 ENCOUNTER — HOSPITAL ENCOUNTER (OUTPATIENT)
Dept: PULMONOLOGY | Age: 83
Discharge: HOME | End: 2018-04-24
Payer: MEDICARE

## 2018-04-24 VITALS — OXYGEN SATURATION: 98 %

## 2018-04-24 DIAGNOSIS — J44.9 CHRONIC OBSTRUCTIVE PULMONARY DISEASE, UNSPECIFIED COPD TYPE (HCC): ICD-10-CM

## 2018-04-24 PROCEDURE — 94726 PLETHYSMOGRAPHY LUNG VOLUMES: CPT

## 2018-04-24 PROCEDURE — 94060 EVALUATION OF WHEEZING: CPT

## 2018-04-24 PROCEDURE — 6360000002 HC RX W HCPCS: Performed by: NURSE PRACTITIONER

## 2018-04-24 PROCEDURE — 94060 EVALUATION OF WHEEZING: CPT | Performed by: INTERNAL MEDICINE

## 2018-04-24 PROCEDURE — 94729 DIFFUSING CAPACITY: CPT

## 2018-04-24 PROCEDURE — 94729 DIFFUSING CAPACITY: CPT | Performed by: INTERNAL MEDICINE

## 2018-04-24 RX ORDER — ALBUTEROL SULFATE 2.5 MG/3ML
2.5 SOLUTION RESPIRATORY (INHALATION) ONCE
Status: COMPLETED | OUTPATIENT
Start: 2018-04-24 | End: 2018-04-24

## 2018-04-24 RX ADMIN — ALBUTEROL SULFATE 2.5 MG: 2.5 SOLUTION RESPIRATORY (INHALATION) at 12:34

## 2018-05-06 ASSESSMENT — ENCOUNTER SYMPTOMS
VOMITING: 0
FACIAL SWELLING: 0
CONSTIPATION: 0
NAUSEA: 0
ABDOMINAL DISTENTION: 0
TROUBLE SWALLOWING: 0
DIARRHEA: 0
COUGH: 0
PHOTOPHOBIA: 0
COLOR CHANGE: 0
BACK PAIN: 0
ABDOMINAL PAIN: 0
WHEEZING: 0
SHORTNESS OF BREATH: 0

## 2018-05-07 ENCOUNTER — CARE COORDINATION (OUTPATIENT)
Dept: CARE COORDINATION | Age: 83
End: 2018-05-07

## 2018-05-22 ENCOUNTER — TELEPHONE (OUTPATIENT)
Dept: FAMILY MEDICINE CLINIC | Age: 83
End: 2018-05-22

## 2018-05-22 DIAGNOSIS — F41.9 ANXIETY: ICD-10-CM

## 2018-05-25 ENCOUNTER — TELEPHONE (OUTPATIENT)
Dept: FAMILY MEDICINE CLINIC | Age: 83
End: 2018-05-25

## 2018-05-25 RX ORDER — ROPINIROLE 0.25 MG/1
0.25 TABLET, FILM COATED ORAL NIGHTLY PRN
Qty: 30 TABLET | Refills: 0 | Status: SHIPPED | OUTPATIENT
Start: 2018-05-25 | End: 2018-06-13 | Stop reason: ALTCHOICE

## 2018-05-25 RX ORDER — CLONAZEPAM 0.5 MG/1
TABLET ORAL
Qty: 8 TABLET | Refills: 0 | Status: SHIPPED | OUTPATIENT
Start: 2018-05-25 | End: 2018-06-05 | Stop reason: ALTCHOICE

## 2018-06-03 ENCOUNTER — TELEPHONE (OUTPATIENT)
Dept: FAMILY MEDICINE CLINIC | Age: 83
End: 2018-06-03

## 2018-06-03 DIAGNOSIS — F51.01 PRIMARY INSOMNIA: Primary | ICD-10-CM

## 2018-06-05 RX ORDER — ZALEPLON 5 MG/1
5 CAPSULE ORAL NIGHTLY
Qty: 14 CAPSULE | Refills: 0 | Status: SHIPPED | OUTPATIENT
Start: 2018-06-05 | End: 2018-06-13 | Stop reason: ALTCHOICE

## 2018-06-12 RX ORDER — LEVOTHYROXINE AND LIOTHYRONINE 9.5; 2.25 UG/1; UG/1
15 TABLET ORAL DAILY
Qty: 30 TABLET | Refills: 5 | Status: SHIPPED | OUTPATIENT
Start: 2018-06-12 | End: 2019-02-04

## 2018-06-13 ENCOUNTER — OFFICE VISIT (OUTPATIENT)
Dept: FAMILY MEDICINE CLINIC | Age: 83
End: 2018-06-13
Payer: MEDICARE

## 2018-06-13 ENCOUNTER — CARE COORDINATION (OUTPATIENT)
Dept: CARE COORDINATION | Age: 83
End: 2018-06-13

## 2018-06-13 VITALS
RESPIRATION RATE: 18 BRPM | WEIGHT: 165 LBS | DIASTOLIC BLOOD PRESSURE: 64 MMHG | OXYGEN SATURATION: 98 % | SYSTOLIC BLOOD PRESSURE: 120 MMHG | HEIGHT: 65 IN | HEART RATE: 58 BPM | BODY MASS INDEX: 27.49 KG/M2

## 2018-06-13 DIAGNOSIS — E03.9 ACQUIRED HYPOTHYROIDISM: ICD-10-CM

## 2018-06-13 DIAGNOSIS — J30.1 CHRONIC SEASONAL ALLERGIC RHINITIS DUE TO POLLEN: ICD-10-CM

## 2018-06-13 DIAGNOSIS — F51.01 PRIMARY INSOMNIA: Primary | ICD-10-CM

## 2018-06-13 PROCEDURE — G8427 DOCREV CUR MEDS BY ELIG CLIN: HCPCS | Performed by: NURSE PRACTITIONER

## 2018-06-13 PROCEDURE — 99214 OFFICE O/P EST MOD 30 MIN: CPT | Performed by: NURSE PRACTITIONER

## 2018-06-13 PROCEDURE — 1036F TOBACCO NON-USER: CPT | Performed by: NURSE PRACTITIONER

## 2018-06-13 PROCEDURE — 4040F PNEUMOC VAC/ADMIN/RCVD: CPT | Performed by: NURSE PRACTITIONER

## 2018-06-13 PROCEDURE — G8417 CALC BMI ABV UP PARAM F/U: HCPCS | Performed by: NURSE PRACTITIONER

## 2018-06-13 PROCEDURE — 1123F ACP DISCUSS/DSCN MKR DOCD: CPT | Performed by: NURSE PRACTITIONER

## 2018-06-13 RX ORDER — MONTELUKAST SODIUM 10 MG/1
10 TABLET ORAL DAILY
Qty: 30 TABLET | Refills: 2 | Status: SHIPPED | OUTPATIENT
Start: 2018-06-13 | End: 2018-07-30 | Stop reason: ALTCHOICE

## 2018-06-13 RX ORDER — AMITRIPTYLINE HYDROCHLORIDE 10 MG/1
10 TABLET, FILM COATED ORAL NIGHTLY
Qty: 30 TABLET | Refills: 2 | Status: SHIPPED | OUTPATIENT
Start: 2018-06-13 | End: 2018-07-30 | Stop reason: ALTCHOICE

## 2018-06-13 ASSESSMENT — ENCOUNTER SYMPTOMS
ABDOMINAL DISTENTION: 1
ABDOMINAL PAIN: 0
COUGH: 0
CONSTIPATION: 1
VOMITING: 0
CHEST TIGHTNESS: 0
NAUSEA: 0
EYES NEGATIVE: 1
BLOOD IN STOOL: 0
DIARRHEA: 0
SHORTNESS OF BREATH: 0
SINUS PAIN: 0

## 2018-06-14 ENCOUNTER — CARE COORDINATION (OUTPATIENT)
Dept: CARE COORDINATION | Age: 83
End: 2018-06-14

## 2018-06-28 ENCOUNTER — CARE COORDINATION (OUTPATIENT)
Dept: CARE COORDINATION | Age: 83
End: 2018-06-28

## 2018-07-06 RX ORDER — ROPINIROLE 0.25 MG/1
0.25 TABLET, FILM COATED ORAL NIGHTLY PRN
Qty: 30 TABLET | Refills: 1 | Status: SHIPPED | OUTPATIENT
Start: 2018-07-06 | End: 2018-07-30 | Stop reason: SDUPTHER

## 2018-07-06 NOTE — TELEPHONE ENCOUNTER
requip medication was stopped when we started the elavil and weaned off the klonopin. Is patient still taking it? Has he trialed without it since taking the new elavil (amitripylline medication). How is the elavil working for his sleep and leg issues?

## 2018-07-06 NOTE — TELEPHONE ENCOUNTER
Inform patient requip refilled for RLS. Ask him to  melatonin 5 mg night to be taken for sleep it is over the counter. Has appt in couple weeks to discuss further.      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     thanks

## 2018-07-06 NOTE — TELEPHONE ENCOUNTER
Spoke with Clif and he said that he stopped taking the elavil because it made his stomach feel weird. He said he is not taking the requip because he is out but it did help him some. Patient said he is only sleeping 4 hours a night. Patient keep saying\" That he really wants his Klonipin back and he didn't know why she took him off of this\". Patient asked if he was able to take 2 tylenol 500 mg each for legs? Patient seemed confused as I know this was all gone over with him before about the Klonipin.

## 2018-07-12 ENCOUNTER — CARE COORDINATION (OUTPATIENT)
Dept: CARE COORDINATION | Age: 83
End: 2018-07-12

## 2018-07-12 RX ORDER — CHOLECALCIFEROL (VITAMIN D3) 125 MCG
5 CAPSULE ORAL DAILY
COMMUNITY
End: 2018-10-08 | Stop reason: ALTCHOICE

## 2018-07-12 NOTE — CARE COORDINATION
Ambulatory Care Coordination Note  7/12/2018  CM Risk Score: 4  Mana Mortality Risk Score: 20.79    ACC: Irma Powell RN    Summary Note: phone call to patient. Patient states he is doing little better. He is sleeping better with the requip and melatonin. His blood sugars have been good ranging from 100's -120's. He has tried to cut back on eating cookies in the morning. Encouraged him to continue with his diet and avoiding sweets. CC Plan of Care:     Continue to assess for additional needs, educate, and offer support. Will call patient in 2-3 weeks and check blood sugars and how he is sleeping. Care Coordination Interventions    Program Enrollment:  Rising Risk  Referral from Primary Care Provider:  No  Suggested Interventions and Community Resources  Fall Risk Prevention:  Completed  Home Health Services:  Declined  Transportation Support:  Completed  Zone Management Tools:  Completed         Goals Addressed             Most Recent     Medication Management   On track (7/12/2018)             I will take my medication as directed. I will notify my provider of any problems with medications, like adverse effects or side effects. I will notify my provider/Care Coordinator if I am unable to afford my medications. I will notify my provider for advice before I stop taking any of my medication. Barriers: impairment:  visual  Plan for overcoming my barriers: Patient will use magnifying glass to read and will participate in care coordination. Confidence: 7/10  Anticipated Goal Completion Date: 06/30/2018.  Nutrition Plan   On track (7/12/2018)             I will avoid eating sweets and will follow a diet low in carbohydrates. Barriers: lack of support and lack of education  Plan for overcoming my barriers: Patient will meet with Diabetic educator and dietician; Patient will participate in care coordination.   Confidence: 8/10  Anticipated Goal Completion Date: 06/30/2018            Prior

## 2018-07-30 ENCOUNTER — OFFICE VISIT (OUTPATIENT)
Dept: FAMILY MEDICINE CLINIC | Age: 83
End: 2018-07-30
Payer: MEDICARE

## 2018-07-30 VITALS
WEIGHT: 165 LBS | HEART RATE: 66 BPM | SYSTOLIC BLOOD PRESSURE: 136 MMHG | RESPIRATION RATE: 18 BRPM | DIASTOLIC BLOOD PRESSURE: 64 MMHG | BODY MASS INDEX: 27.49 KG/M2 | HEIGHT: 65 IN | OXYGEN SATURATION: 98 %

## 2018-07-30 DIAGNOSIS — H54.40 BLIND RIGHT EYE: ICD-10-CM

## 2018-07-30 DIAGNOSIS — J44.9 CHRONIC OBSTRUCTIVE PULMONARY DISEASE, UNSPECIFIED COPD TYPE (HCC): ICD-10-CM

## 2018-07-30 DIAGNOSIS — Z91.81 AT HIGH RISK FOR FALLS: ICD-10-CM

## 2018-07-30 DIAGNOSIS — R29.6 FREQUENT FALLS: Primary | ICD-10-CM

## 2018-07-30 DIAGNOSIS — I10 ESSENTIAL HYPERTENSION, BENIGN: ICD-10-CM

## 2018-07-30 DIAGNOSIS — G25.81 RESTLESS LEG SYNDROME: ICD-10-CM

## 2018-07-30 DIAGNOSIS — Z86.73 HISTORY OF STROKE: ICD-10-CM

## 2018-07-30 DIAGNOSIS — E03.9 ACQUIRED HYPOTHYROIDISM: ICD-10-CM

## 2018-07-30 PROCEDURE — 3023F SPIROM DOC REV: CPT | Performed by: NURSE PRACTITIONER

## 2018-07-30 PROCEDURE — 1123F ACP DISCUSS/DSCN MKR DOCD: CPT | Performed by: NURSE PRACTITIONER

## 2018-07-30 PROCEDURE — 1036F TOBACCO NON-USER: CPT | Performed by: NURSE PRACTITIONER

## 2018-07-30 PROCEDURE — 1101F PT FALLS ASSESS-DOCD LE1/YR: CPT | Performed by: NURSE PRACTITIONER

## 2018-07-30 PROCEDURE — G8417 CALC BMI ABV UP PARAM F/U: HCPCS | Performed by: NURSE PRACTITIONER

## 2018-07-30 PROCEDURE — G8427 DOCREV CUR MEDS BY ELIG CLIN: HCPCS | Performed by: NURSE PRACTITIONER

## 2018-07-30 PROCEDURE — 99214 OFFICE O/P EST MOD 30 MIN: CPT | Performed by: NURSE PRACTITIONER

## 2018-07-30 PROCEDURE — G8926 SPIRO NO PERF OR DOC: HCPCS | Performed by: NURSE PRACTITIONER

## 2018-07-30 PROCEDURE — 4040F PNEUMOC VAC/ADMIN/RCVD: CPT | Performed by: NURSE PRACTITIONER

## 2018-07-30 RX ORDER — ROPINIROLE 0.5 MG/1
0.5 TABLET, FILM COATED ORAL NIGHTLY PRN
Qty: 30 TABLET | Refills: 2 | Status: SHIPPED | OUTPATIENT
Start: 2018-07-30 | End: 2018-10-28 | Stop reason: SDUPTHER

## 2018-07-30 NOTE — PROGRESS NOTES
On the basis of positive falls risk screening, assessment and plan is as follows: home safety tips provided     Preventing falls is a special concern due to an increase in the likelihood of fracturing a bone. Factors affecting FALLS include: environmental factors, impaired vision or balance, chronic diseases that affect mental or physical functioning and certain medications (sedatives, antidepressants, benzodiazepines, narcotics and alcohol). Here are some tips to eliminate environmental factors that can lead to falls    Outdoors:  -use a cane or walker for added stability  -wear rubber-soled shoes for traction  -walk on grass when sidewalks are slippery  -In winter, carry salt or brooke litter to sprinkle on slippery sidewalks.   -be careful on highly polished floors that become slick and dangerous when wet  -walk on even or level ground when able    Indoors:  -Keep rooms free of clutter, especially the floors  -Keep floor surfaces smooth but not slippery  -Wear supportive, low-heeled shoes even at home  -Avoid walking in socks, stockings or slippers.  -Be sure carpets and area rugs have skid-proof backing or are tacked to the floor  -Install grab bars on the bathroom walls near tub, shower and toilet. -Use a rubber bath mat in shower or tub  -Keep a flashlight with fresh batteries beside your bed.   -If using a step stool for hard-to-reach places, use a sturdy one with a handrails on both sides. OR  As a family member to help.   -Add ceiling fixtures to rooms lit by lamps. -Consider purchasing a cordless phone so that you do not have to rush to answer the phone when it rings, or so that you can call for help if you do fall. Ibrahima Kebede

## 2018-07-30 NOTE — PROGRESS NOTES
Subjective:      Patient ID: Akbar Joshi is a 80 y.o. male. Social History     Social History    Marital status:      Spouse name: N/A    Number of children: N/A    Years of education: N/A     Social History Main Topics    Smoking status: Never Smoker    Smokeless tobacco: Never Used    Alcohol use No    Drug use: Unknown    Sexual activity: Not Asked     Other Topics Concern    None     Social History Narrative    None     Family History   Problem Relation Age of Onset    Cancer Mother         colon    Cancer Sister         colon    Cancer Other         colon    Cancer Sister         colon     Past Medical History:   Diagnosis Date    Back pain     Blind right eye     left eye poor vision also    Diabetes mellitus ([address]     Diverticulosis     Glaucoma     Hypothyroidism (acquired)        HPI  Patient with trouble with balance and has fallen over backwards several times at home. Does not have any precursor that tells him that he is going to fall. Just starts going over backwards. Has bruises on forearms he shows me today from falls. Denies any LOC and denies any injury to head. Does offer got caught behind chair once that it was difficult to get out had to push chair out of the way. Patient has been weaned off klonopin due to high risk medication being long acting benzo  Trial sonata with poor effect. Also trial elavil which pt offers did not like how it made his feel. He is taking melatonin at bedtime sleeping better and is using the requip 0.25 mg dose with some improvement in RLS. Allie Arringtonl back once in kitchen chair caught me, and once in living room onto floor. Patient lives locally behind [employer] in apartments and feels it is more difficult to ambulate over to the restaurant from his apartment due to feels he is leaning to the left with walking. Patient is blind in right eye and hx brain surgery when he was age 23, denies having any clips in head .  Felt to be benefit, and side effects of prescribed medications. Barriers to medication compliance addressed. All patient questions answered. Pt voiced understanding. 4.  Reviewed prior labs and health maintenance  5. Continue current medications, diet and exercise.     7/31/2018 10:14 PM  Completed Refills   Requested Prescriptions     Signed Prescriptions Disp Refills    rOPINIRole (REQUIP) 0.5 MG tablet 30 tablet 2     Sig: Take 1 tablet by mouth nightly as needed (restless leg syndrome)

## 2018-07-31 ASSESSMENT — ENCOUNTER SYMPTOMS
EYES NEGATIVE: 1
SORE THROAT: 0
SHORTNESS OF BREATH: 0
COUGH: 0
CHEST TIGHTNESS: 0
CONSTIPATION: 0
VOICE CHANGE: 1
ABDOMINAL DISTENTION: 0

## 2018-08-03 ENCOUNTER — HOSPITAL ENCOUNTER (OUTPATIENT)
Dept: MRI IMAGING | Age: 83
Discharge: HOME | End: 2018-08-05
Payer: MEDICARE

## 2018-08-03 DIAGNOSIS — H54.40 BLIND RIGHT EYE: ICD-10-CM

## 2018-08-03 DIAGNOSIS — Z86.73 HISTORY OF STROKE: ICD-10-CM

## 2018-08-03 DIAGNOSIS — R29.6 FREQUENT FALLS: ICD-10-CM

## 2018-08-03 PROCEDURE — 70551 MRI BRAIN STEM W/O DYE: CPT

## 2018-08-06 DIAGNOSIS — Z98.890 HX OF CEREBRAL ANEURYSM REPAIR: ICD-10-CM

## 2018-08-06 DIAGNOSIS — Z86.79 HX OF CEREBRAL ANEURYSM REPAIR: ICD-10-CM

## 2018-08-06 DIAGNOSIS — Z86.73 HISTORY OF STROKE: Primary | ICD-10-CM

## 2018-08-06 DIAGNOSIS — R29.6 FREQUENT FALLS: ICD-10-CM

## 2018-08-13 ENCOUNTER — HOSPITAL ENCOUNTER (OUTPATIENT)
Dept: VASCULAR LAB | Age: 83
Discharge: HOME | End: 2018-08-15
Payer: MEDICARE

## 2018-08-13 DIAGNOSIS — Z86.73 HISTORY OF STROKE: ICD-10-CM

## 2018-08-13 DIAGNOSIS — Z98.890 HX OF CEREBRAL ANEURYSM REPAIR: ICD-10-CM

## 2018-08-13 DIAGNOSIS — Z86.79 HX OF CEREBRAL ANEURYSM REPAIR: ICD-10-CM

## 2018-08-13 PROCEDURE — 93880 EXTRACRANIAL BILAT STUDY: CPT

## 2018-08-16 ENCOUNTER — CARE COORDINATION (OUTPATIENT)
Dept: CARE COORDINATION | Age: 83
End: 2018-08-16

## 2018-08-16 NOTE — CARE COORDINATION
symptoms?:  No   Do you have hypoglycemia symptoms?:  No   Last Blood Sugar Value:  126   Blood Sugar Monitoring Regimen:  Once a Day   Blood Sugar Trends:  Steady Decrease        Lab Results   Component Value Date    LABA1C 7.4 (H) 04/03/2018    LABA1C 7.9 03/12/2018    LABA1C 7.2 (H) 08/02/2017     Lab Results   Component Value Date    LABMICR 217 (H) 03/12/2018    CREATININE 1.73 (H) 04/03/2018       Wt Readings from Last 3 Encounters:   07/30/18 165 lb (74.8 kg)   06/13/18 165 lb (74.8 kg)   04/20/18 167 lb (75.8 kg)       COPD Assessment    Does the patient understand envrionmental exposure?:  Yes  Is the patient able to verbalize Rescue vs. Long Acting medications?:  Yes  Does the patient have a nebulizer?:  No  Does the patient use a space with inhaled medications?:  No     No patient-reported symptoms         Symptoms:      Symptom course:  stable  Change in chronic cough?:  No/At Baseline  Change in sputum?:  No/At Baseline  Have you had a recent diagnosis of pneumonia either by PCP or at a hospital?:  No         Care Coordination Interventions    Program Enrollment:  Rising Risk  Referral from Primary Care Provider:  No  Suggested Interventions and Community Resources  Fall Risk Prevention:  Completed  Home Health Services:  Declined  Transportation Support:  Completed  Zone Management Tools:  Completed         Goals Addressed     None          Prior to Admission medications    Medication Sig Start Date End Date Taking?  Authorizing Provider   rOPINIRole (REQUIP) 0.5 MG tablet Take 1 tablet by mouth nightly as needed (restless leg syndrome) 7/30/18  Yes NOHELIA Adair CNP   melatonin 5 MG TABS tablet Take 5 mg by mouth daily   Yes Historical Provider, MD   thyroid [facility] THYROID) 15 MG tablet Take 1 tablet by mouth daily 6/12/18  Yes NOHELIA Adair CNP   simvastatin (ZOCOR) 10 MG tablet take 1 tablet by mouth at bedtime 5/5/18  Yes NOHELIA Adair CNP   glucose blood VI test strips (EXACTECH TEST) strip 1 each by In Vitro route 3 times daily As needed. 3/26/18  Yes NOHELIA Rodgers CNP   pantoprazole (PROTONIX) 20 MG tablet Take 1 tablet by mouth daily 3/12/18  Yes NOHELIA Rodgers CNP   NONFORMULARY Indications: Milk of magnesia. Yes Historical Provider, MD   albuterol sulfate HFA (PROVENTIL HFA) 108 (90 Base) MCG/ACT inhaler Inhale 2 puffs into the lungs every 6 hours as needed for Wheezing 9/18/17  Yes NOHELIA Rodgers CNP   senna (SENOKOT) 8.6 MG tablet Take 1 tablet by mouth daily Patient takes 1 tablet daily and additional pill PRN. Yes Historical Provider, MD   NONFORMULARY Take 1 tablet by mouth as needed (Leg cramp relief) Indications: Leg Cramps José's leg cramp relief 50 quick-dissolving tablets, homeopathic.    Yes Historical Provider, MD       Future Appointments  Date Time Provider Mai Gutierrez   10/8/2018 12:50 PM [facility]  None   10/8/2018 1:00 PM NOHELIA Rodgers CNP [facility]

## 2018-09-07 DIAGNOSIS — K21.9 GASTROESOPHAGEAL REFLUX DISEASE WITHOUT ESOPHAGITIS: ICD-10-CM

## 2018-09-09 RX ORDER — PANTOPRAZOLE SODIUM 20 MG/1
TABLET, DELAYED RELEASE ORAL
Qty: 90 TABLET | Refills: 1 | Status: SHIPPED | OUTPATIENT
Start: 2018-09-09 | End: 2019-02-04

## 2018-09-10 ENCOUNTER — HOSPITAL ENCOUNTER (EMERGENCY)
Age: 83
Discharge: HOME | End: 2018-09-10
Attending: EMERGENCY MEDICINE
Payer: MEDICARE

## 2018-09-10 ENCOUNTER — APPOINTMENT (OUTPATIENT)
Dept: GENERAL RADIOLOGY | Age: 83
End: 2018-09-10
Payer: MEDICARE

## 2018-09-10 VITALS
OXYGEN SATURATION: 96 % | HEART RATE: 69 BPM | TEMPERATURE: 98.2 F | SYSTOLIC BLOOD PRESSURE: 155 MMHG | DIASTOLIC BLOOD PRESSURE: 87 MMHG | RESPIRATION RATE: 18 BRPM

## 2018-09-10 DIAGNOSIS — G47.09 OTHER INSOMNIA: ICD-10-CM

## 2018-09-10 DIAGNOSIS — R45.1 RESTLESSNESS: Primary | ICD-10-CM

## 2018-09-10 LAB
-: NORMAL
ABSOLUTE EOS #: 0.1 K/UL (ref 0–0.4)
ABSOLUTE IMMATURE GRANULOCYTE: ABNORMAL K/UL (ref 0–0.3)
ABSOLUTE LYMPH #: 1.9 K/UL (ref 1–4.8)
ABSOLUTE MONO #: 0.4 K/UL (ref 0–1)
ALBUMIN SERPL-MCNC: 3.9 G/DL (ref 3.5–5.2)
ALBUMIN/GLOBULIN RATIO: ABNORMAL (ref 1–2.5)
ALP BLD-CCNC: 86 U/L (ref 40–129)
ALT SERPL-CCNC: 12 U/L (ref 5–41)
AMORPHOUS: NORMAL
ANION GAP SERPL CALCULATED.3IONS-SCNC: 13 MMOL/L (ref 9–17)
AST SERPL-CCNC: 28 U/L
BACTERIA: NORMAL
BASOPHILS # BLD: 0 % (ref 0–2)
BASOPHILS ABSOLUTE: 0 K/UL (ref 0–0.2)
BILIRUB SERPL-MCNC: 0.42 MG/DL (ref 0.3–1.2)
BILIRUBIN URINE: NEGATIVE
BUN BLDV-MCNC: 19 MG/DL (ref 8–23)
BUN/CREAT BLD: 12 (ref 9–20)
CALCIUM SERPL-MCNC: 9.6 MG/DL (ref 8.6–10.4)
CASTS UA: NORMAL /LPF
CHLORIDE BLD-SCNC: 95 MMOL/L (ref 98–107)
CO2: 25 MMOL/L (ref 20–31)
COLOR: YELLOW
COMMENT UA: ABNORMAL
CREAT SERPL-MCNC: 1.58 MG/DL (ref 0.7–1.2)
CRYSTALS, UA: NORMAL /HPF
DIFFERENTIAL TYPE: YES
EKG ATRIAL RATE: 71 BPM
EKG P AXIS: 44 DEGREES
EKG P-R INTERVAL: 190 MS
EKG Q-T INTERVAL: 418 MS
EKG QRS DURATION: 96 MS
EKG QTC CALCULATION (BAZETT): 454 MS
EKG R AXIS: -40 DEGREES
EKG T AXIS: 54 DEGREES
EKG VENTRICULAR RATE: 71 BPM
EOSINOPHILS RELATIVE PERCENT: 2 % (ref 0–5)
EPITHELIAL CELLS UA: NORMAL /HPF
GFR AFRICAN AMERICAN: 51 ML/MIN
GFR NON-AFRICAN AMERICAN: 42 ML/MIN
GFR SERPL CREATININE-BSD FRML MDRD: ABNORMAL ML/MIN/{1.73_M2}
GFR SERPL CREATININE-BSD FRML MDRD: ABNORMAL ML/MIN/{1.73_M2}
GLUCOSE BLD-MCNC: 163 MG/DL (ref 70–99)
GLUCOSE URINE: NEGATIVE
HCT VFR BLD CALC: 30.5 % (ref 41–53)
HEMOGLOBIN: 10.4 G/DL (ref 13.5–17.5)
IMMATURE GRANULOCYTES: ABNORMAL %
KETONES, URINE: NEGATIVE
LEUKOCYTE ESTERASE, URINE: NEGATIVE
LYMPHOCYTES # BLD: 36 % (ref 13–44)
MCH RBC QN AUTO: 31.4 PG (ref 26–34)
MCHC RBC AUTO-ENTMCNC: 34 G/DL (ref 31–37)
MCV RBC AUTO: 92.3 FL (ref 80–100)
MONOCYTES # BLD: 8 % (ref 5–9)
MUCUS: NORMAL
NITRITE, URINE: NEGATIVE
NRBC AUTOMATED: ABNORMAL PER 100 WBC
OTHER OBSERVATIONS UA: NORMAL
PDW BLD-RTO: 12.5 % (ref 12.1–15.2)
PH UA: 7 (ref 5–8)
PLATELET # BLD: 179 K/UL (ref 140–450)
PLATELET ESTIMATE: ABNORMAL
PMV BLD AUTO: ABNORMAL FL (ref 6–12)
POTASSIUM SERPL-SCNC: 3.8 MMOL/L (ref 3.7–5.3)
PROTEIN UA: ABNORMAL
RBC # BLD: 3.31 M/UL (ref 4.5–5.9)
RBC # BLD: ABNORMAL 10*6/UL
RBC UA: NORMAL /HPF (ref 0–2)
RENAL EPITHELIAL, UA: NORMAL /HPF
SEG NEUTROPHILS: 54 % (ref 39–75)
SEGMENTED NEUTROPHILS ABSOLUTE COUNT: 2.8 K/UL (ref 2.1–6.5)
SODIUM BLD-SCNC: 133 MMOL/L (ref 135–144)
SPECIFIC GRAVITY UA: 1.01 (ref 1–1.03)
TOTAL PROTEIN: 6.9 G/DL (ref 6.4–8.3)
TRICHOMONAS: NORMAL
TURBIDITY: CLEAR
URINE HGB: NEGATIVE
UROBILINOGEN, URINE: NORMAL
WBC # BLD: 5.2 K/UL (ref 3.5–11)
WBC # BLD: ABNORMAL 10*3/UL
WBC UA: NORMAL /HPF
YEAST: NORMAL

## 2018-09-10 PROCEDURE — 81001 URINALYSIS AUTO W/SCOPE: CPT

## 2018-09-10 PROCEDURE — 85025 COMPLETE CBC W/AUTO DIFF WBC: CPT

## 2018-09-10 PROCEDURE — 93005 ELECTROCARDIOGRAM TRACING: CPT

## 2018-09-10 PROCEDURE — 71045 X-RAY EXAM CHEST 1 VIEW: CPT

## 2018-09-10 PROCEDURE — 6370000000 HC RX 637 (ALT 250 FOR IP): Performed by: EMERGENCY MEDICINE

## 2018-09-10 PROCEDURE — 99285 EMERGENCY DEPT VISIT HI MDM: CPT

## 2018-09-10 PROCEDURE — 80053 COMPREHEN METABOLIC PANEL: CPT

## 2018-09-10 RX ORDER — ALBUTEROL SULFATE 2.5 MG/3ML
2.5 SOLUTION RESPIRATORY (INHALATION) EVERY 4 HOURS PRN
COMMUNITY
End: 2019-05-13 | Stop reason: ALTCHOICE

## 2018-09-10 RX ORDER — MONTELUKAST SODIUM 10 MG/1
10 TABLET ORAL DAILY
COMMUNITY
End: 2018-10-04 | Stop reason: SDUPTHER

## 2018-09-10 RX ORDER — TRAZODONE HYDROCHLORIDE 50 MG/1
50 TABLET ORAL NIGHTLY
Qty: 30 TABLET | Refills: 0 | Status: SHIPPED | OUTPATIENT
Start: 2018-09-10 | End: 2018-10-08 | Stop reason: SDUPTHER

## 2018-09-10 RX ORDER — LORAZEPAM 0.5 MG/1
0.5 TABLET ORAL ONCE
Status: COMPLETED | OUTPATIENT
Start: 2018-09-10 | End: 2018-09-10

## 2018-09-10 RX ADMIN — LORAZEPAM 0.5 MG: 0.5 TABLET ORAL at 05:00

## 2018-09-10 NOTE — ED PROVIDER NOTES
eMERGENCY dEPARTMENT eNCOUnter      [address]    Chief Complaint   Patient presents with    Shortness of Breath    Insomnia       HPI    Sangita Stratton is a 80 y.o. male who presentsto ED from home. By EMS  With complaint of Restlessness and insomnia  Onset For the past 3 months. Patient states that he has been taking Klonopin at night which has been discontinued. Patient states that since then he has been having hard time at night. Patient also had some shortness of breath earlier tonight. PAST MEDICAL HISTORY    Past Medical History:   Diagnosis Date    Back pain     Blind right eye     left eye poor vision also    Diabetes mellitus (HCC)     Diverticulosis     Glaucoma     Hernia, ventral     Hypothyroidism (acquired)        SURGICAL HISTORY    Past Surgical History:   Procedure Laterality Date   Angel Shallow    Dr. Mauro Meyers  2004    Dr. Chai Schmid    ERCP  08/02/2017    balloon sweep,    ERCP N/A 8/2/2017    ERCP ENDOSCOPIC RETROGRADE CHOLANGIOPANCREATOGRAPHY WITH BALLOON SWEEP AND CHOLANGIOGRAM, GI UNIT, C-ARM  performed by Janet Simons MD at [address]      blood in the eyeball    OTHER SURGICAL HISTORY  05-20-16    Laparoscopic selena. converted to open selena.     TONSILLECTOMY         CURRENT MEDICATIONS    Current Outpatient Rx   Medication Sig Dispense Refill    montelukast (SINGULAIR) 10 MG tablet Take 10 mg by mouth daily      albuterol (PROVENTIL) (2.5 MG/3ML) 0.083% nebulizer solution Take 2.5 mg by nebulization every 4 hours as needed for Wheezing      traZODone (DESYREL) 50 MG tablet Take 1 tablet by mouth nightly 30 tablet 0    pantoprazole (PROTONIX) 20 MG tablet take 1 tablet by mouth once daily 90 tablet 1    rOPINIRole (REQUIP) 0.5 MG tablet Take 1 tablet by mouth nightly as needed (restless leg syndrome) 30 tablet 2    melatonin 5 MG TABS tablet Take 5 mg by mouth daily      thyroid (ARMOUR THYROID) 15 MG tablet Take 1 tablet by mouth daily 30 tablet 5    simvastatin (ZOCOR) 10 MG tablet take 1 tablet by mouth at bedtime 90 tablet 1    glucose blood VI test strips (EXACTECH TEST) strip 1 each by In Vitro route 3 times daily As needed. 300 each 3    NONFORMULARY Indications: Milk of magnesia.  albuterol sulfate HFA (PROVENTIL HFA) 108 (90 Base) MCG/ACT inhaler Inhale 2 puffs into the lungs every 6 hours as needed for Wheezing 3 Inhaler 3    senna (SENOKOT) 8.6 MG tablet Take 1 tablet by mouth daily Patient takes 1 tablet daily and additional pill PRN.      NONFORMULARY Take 1 tablet by mouth as needed (Leg cramp relief) Indications: Leg Cramps José's leg cramp relief 50 quick-dissolving tablets, homeopathic. ALLERGIES    Allergies   Allergen Reactions    Aspirin Other (See Comments)     States GI upset only with more than one low dose aspirin    Codeine Itching    Penicillins        FAMILY HISTORY    Family History   Problem Relation Age of Onset    Cancer Mother         colon    Cancer Sister         colon    Cancer Other         colon    Cancer Sister         colon       SOCIAL HISTORY    Social History     Social History    Marital status:       Spouse name: N/A    Number of children: N/A    Years of education: N/A     Social History Main Topics    Smoking status: Never Smoker    Smokeless tobacco: Never Used    Alcohol use No    Drug use: Unknown    Sexual activity: Not Asked     Other Topics Concern    None     Social History Narrative    None       REVIEW OF SYSTEMS    Constitutional:  Denies fever, chills, weight loss or weakness   Eyes:  Denies photophobia or discharge   HENT:  Denies sore throat or ear pain   Respiratory:  Positive shortness of breath Cardiovascular:  Denies chest pain, palpitations or swelling   GI:  History of ventral hernia Musculoskeletal:  Denies back pain   Skin:  Denies rash   Neurologic:  Denies

## 2018-09-10 NOTE — ED NOTES
Pt arrives per EMS c/o shortness of breath for past 2 months, states also hasn't slept in past 2 months, pt c/o having some anxiety and ever since Bari Vickers took him off his klonopin he's be having a rough time, pt restless and fidgety here, continuously moves about on cart, pt continues to talk about klonopin and ever since she stopped that he's been having trouble.          To Hendrix RN  09/10/18 [id number]

## 2018-09-16 DIAGNOSIS — J30.1 CHRONIC SEASONAL ALLERGIC RHINITIS DUE TO POLLEN: ICD-10-CM

## 2018-09-17 RX ORDER — MONTELUKAST SODIUM 10 MG/1
10 TABLET ORAL DAILY
Qty: 30 TABLET | Refills: 2 | Status: SHIPPED | OUTPATIENT
Start: 2018-09-17 | End: 2018-12-28 | Stop reason: SDUPTHER

## 2018-10-04 ENCOUNTER — CARE COORDINATION (OUTPATIENT)
Dept: CARE COORDINATION | Age: 83
End: 2018-10-04

## 2018-10-08 ENCOUNTER — OFFICE VISIT (OUTPATIENT)
Dept: FAMILY MEDICINE CLINIC | Age: 83
End: 2018-10-08
Payer: MEDICARE

## 2018-10-08 VITALS
HEART RATE: 63 BPM | HEIGHT: 65 IN | WEIGHT: 170 LBS | TEMPERATURE: 98.1 F | SYSTOLIC BLOOD PRESSURE: 114 MMHG | DIASTOLIC BLOOD PRESSURE: 52 MMHG | OXYGEN SATURATION: 98 % | BODY MASS INDEX: 28.32 KG/M2

## 2018-10-08 DIAGNOSIS — K57.30 DIVERTICULOSIS OF LARGE INTESTINE WITHOUT HEMORRHAGE: Primary | ICD-10-CM

## 2018-10-08 DIAGNOSIS — K42.0 IRREDUCIBLE UMBILICAL HERNIA: ICD-10-CM

## 2018-10-08 DIAGNOSIS — E78.00 HYPERCHOLESTEROLEMIA: ICD-10-CM

## 2018-10-08 DIAGNOSIS — E03.9 ACQUIRED HYPOTHYROIDISM: ICD-10-CM

## 2018-10-08 DIAGNOSIS — Z23 NEEDS FLU SHOT: ICD-10-CM

## 2018-10-08 DIAGNOSIS — F51.01 PRIMARY INSOMNIA: ICD-10-CM

## 2018-10-08 DIAGNOSIS — H54.40 BLIND RIGHT EYE: ICD-10-CM

## 2018-10-08 PROCEDURE — 1101F PT FALLS ASSESS-DOCD LE1/YR: CPT | Performed by: NURSE PRACTITIONER

## 2018-10-08 PROCEDURE — G8482 FLU IMMUNIZE ORDER/ADMIN: HCPCS | Performed by: NURSE PRACTITIONER

## 2018-10-08 PROCEDURE — 99214 OFFICE O/P EST MOD 30 MIN: CPT | Performed by: NURSE PRACTITIONER

## 2018-10-08 PROCEDURE — G8427 DOCREV CUR MEDS BY ELIG CLIN: HCPCS | Performed by: NURSE PRACTITIONER

## 2018-10-08 PROCEDURE — 1036F TOBACCO NON-USER: CPT | Performed by: NURSE PRACTITIONER

## 2018-10-08 PROCEDURE — G0008 ADMIN INFLUENZA VIRUS VAC: HCPCS | Performed by: NURSE PRACTITIONER

## 2018-10-08 PROCEDURE — 4040F PNEUMOC VAC/ADMIN/RCVD: CPT | Performed by: NURSE PRACTITIONER

## 2018-10-08 PROCEDURE — G8417 CALC BMI ABV UP PARAM F/U: HCPCS | Performed by: NURSE PRACTITIONER

## 2018-10-08 PROCEDURE — 1123F ACP DISCUSS/DSCN MKR DOCD: CPT | Performed by: NURSE PRACTITIONER

## 2018-10-08 PROCEDURE — 90686 IIV4 VACC NO PRSV 0.5 ML IM: CPT | Performed by: NURSE PRACTITIONER

## 2018-10-08 RX ORDER — SIMVASTATIN 10 MG
10 TABLET ORAL NIGHTLY
Qty: 90 TABLET | Refills: 1 | Status: SHIPPED | OUTPATIENT
Start: 2018-10-08 | End: 2019-05-01 | Stop reason: SDUPTHER

## 2018-10-08 RX ORDER — TRAZODONE HYDROCHLORIDE 50 MG/1
50 TABLET ORAL NIGHTLY
Qty: 30 TABLET | Refills: 2 | Status: SHIPPED | OUTPATIENT
Start: 2018-10-08 | End: 2019-01-07 | Stop reason: SDUPTHER

## 2018-10-08 ASSESSMENT — ENCOUNTER SYMPTOMS
SORE THROAT: 0
COUGH: 0
SHORTNESS OF BREATH: 0
CHEST TIGHTNESS: 0
ABDOMINAL DISTENTION: 0

## 2018-10-08 NOTE — PROGRESS NOTES
No      Current Outpatient Prescriptions   Medication Sig Dispense Refill    simvastatin (ZOCOR) 10 MG tablet Take 1 tablet by mouth nightly 90 tablet 1    traZODone (DESYREL) 50 MG tablet Take 1 tablet by mouth nightly 30 tablet 2    montelukast (SINGULAIR) 10 MG tablet take 1 tablet by mouth daily 30 tablet 2    albuterol (PROVENTIL) (2.5 MG/3ML) 0.083% nebulizer solution Take 2.5 mg by nebulization every 4 hours as needed for Wheezing      pantoprazole (PROTONIX) 20 MG tablet take 1 tablet by mouth once daily 90 tablet 1    rOPINIRole (REQUIP) 0.5 MG tablet Take 1 tablet by mouth nightly as needed (restless leg syndrome) 30 tablet 2    thyroid (ARMOUR THYROID) 15 MG tablet Take 1 tablet by mouth daily 30 tablet 5    glucose blood VI test strips (EXACTECH TEST) strip 1 each by In Vitro route 3 times daily As needed. 300 each 3    NONFORMULARY Indications: Milk of magnesia.  albuterol sulfate HFA (PROVENTIL HFA) 108 (90 Base) MCG/ACT inhaler Inhale 2 puffs into the lungs every 6 hours as needed for Wheezing 3 Inhaler 3    NONFORMULARY Take 1 tablet by mouth as needed (Leg cramp relief) Indications: Leg Cramps José's leg cramp relief 50 quick-dissolving tablets, homeopathic.  senna (SENOKOT) 8.6 MG tablet Take 1 tablet by mouth daily Patient takes 1 tablet daily and additional pill PRN. No current facility-administered medications for this visit.       Allergies   Allergen Reactions    Aspirin Other (See Comments)     States GI upset only with more than one low dose aspirin    Codeine Itching    Penicillins        Health Maintenance   Topic Date Due    Diabetic foot exam  05/10/1944    DTaP/Tdap/Td vaccine (1 - Tdap) 05/10/1953    Shingles Vaccine (1 of 2 - 2 Dose Series) 05/10/1984    Pneumococcal low/med risk (1 of 2 - PCV13) 05/10/1999    Diabetic retinal exam  05/10/2009    A1C test (Diabetic or Prediabetic)  10/03/2018    Lipid screen  04/03/2019    TSH testing  04/03/2019 control planned discussed Healthy diet and regular exercise. BP: (!) 114/52. Blood pressure is normal. Treatment plan consists of Weight Reduction, Increased Physical Activity and No treatment change needed. Fall Risk 8/16/2018 7/30/2018 6/28/2018 6/13/2018 4/20/2018 8/17/2017 9/8/2016   2 or more falls in past year? no yes no no no no no   Fall with injury in past year? yes no no no no no no     The patient did have a history of falls. I did , complete a risk assessment for falls.  A plan of care for falls home safety tips provided, No Treatment plan indicated    Lab Results   Component Value Date    LDLCHOLESTEROL 42 04/03/2018    (goal LDL reduction with dx if diabetes is 50% LDL reduction)    PHQ Scores 7/30/2018 4/20/2018 9/18/2017 9/8/2016 3/7/2016 3/7/2016 8/21/2014   PHQ2 Score 0 2 0 0 0 0 0   PHQ9 Score 0 2 0 0 0 0 0     Interpretation of Total Score Depression Severity: 1-4 = Minimal depression, 5-9 = Mild depression, 10-14 = Moderate depression, 15-19 = Moderately severe depression, 20-27 = Severe depression           Electronically signed by NOHELIA Redding CNP on 10/15/2018 at 1:43 PM

## 2018-10-16 ENCOUNTER — OFFICE VISIT (OUTPATIENT)
Dept: SURGERY | Age: 83
End: 2018-10-16
Payer: MEDICARE

## 2018-10-16 DIAGNOSIS — K43.2 INCISIONAL HERNIA, WITHOUT OBSTRUCTION OR GANGRENE: Primary | ICD-10-CM

## 2018-10-16 DIAGNOSIS — K59.00 CONSTIPATION, UNSPECIFIED CONSTIPATION TYPE: ICD-10-CM

## 2018-10-16 PROCEDURE — G8427 DOCREV CUR MEDS BY ELIG CLIN: HCPCS | Performed by: SURGERY

## 2018-10-16 PROCEDURE — 99203 OFFICE O/P NEW LOW 30 MIN: CPT | Performed by: SURGERY

## 2018-10-16 PROCEDURE — G8482 FLU IMMUNIZE ORDER/ADMIN: HCPCS | Performed by: SURGERY

## 2018-10-16 PROCEDURE — 1123F ACP DISCUSS/DSCN MKR DOCD: CPT | Performed by: SURGERY

## 2018-10-16 PROCEDURE — 4040F PNEUMOC VAC/ADMIN/RCVD: CPT | Performed by: SURGERY

## 2018-10-16 PROCEDURE — 1036F TOBACCO NON-USER: CPT | Performed by: SURGERY

## 2018-10-16 PROCEDURE — 1101F PT FALLS ASSESS-DOCD LE1/YR: CPT | Performed by: SURGERY

## 2018-10-16 PROCEDURE — G8417 CALC BMI ABV UP PARAM F/U: HCPCS | Performed by: SURGERY

## 2018-10-18 VITALS
WEIGHT: 168 LBS | HEART RATE: 70 BPM | BODY MASS INDEX: 27.99 KG/M2 | RESPIRATION RATE: 18 BRPM | SYSTOLIC BLOOD PRESSURE: 130 MMHG | HEIGHT: 65 IN | DIASTOLIC BLOOD PRESSURE: 60 MMHG

## 2018-10-19 ENCOUNTER — HOSPITAL ENCOUNTER (OUTPATIENT)
Age: 83
Discharge: HOME | End: 2018-10-19
Payer: MEDICARE

## 2018-10-19 ENCOUNTER — HOSPITAL ENCOUNTER (OUTPATIENT)
Dept: CT IMAGING | Age: 83
Discharge: HOME | End: 2018-10-21
Payer: MEDICARE

## 2018-10-19 DIAGNOSIS — K43.2 INCISIONAL HERNIA, WITHOUT OBSTRUCTION OR GANGRENE: ICD-10-CM

## 2018-10-19 LAB
BUN BLDV-MCNC: 26 MG/DL (ref 8–23)
CREAT SERPL-MCNC: 1.77 MG/DL (ref 0.7–1.2)
GFR AFRICAN AMERICAN: 45 ML/MIN
GFR NON-AFRICAN AMERICAN: 37 ML/MIN
GFR SERPL CREATININE-BSD FRML MDRD: ABNORMAL ML/MIN/{1.73_M2}
GFR SERPL CREATININE-BSD FRML MDRD: ABNORMAL ML/MIN/{1.73_M2}

## 2018-10-19 PROCEDURE — 84520 ASSAY OF UREA NITROGEN: CPT

## 2018-10-19 PROCEDURE — 82565 ASSAY OF CREATININE: CPT

## 2018-10-19 PROCEDURE — 36415 COLL VENOUS BLD VENIPUNCTURE: CPT

## 2018-10-19 PROCEDURE — 6360000004 HC RX CONTRAST MEDICATION: Performed by: SURGERY

## 2018-10-19 PROCEDURE — 74177 CT ABD & PELVIS W/CONTRAST: CPT

## 2018-10-19 RX ADMIN — IOPAMIDOL 75 ML: 755 INJECTION, SOLUTION INTRAVENOUS at 12:43

## 2018-10-19 RX ADMIN — IOHEXOL 25 ML: 240 INJECTION, SOLUTION INTRATHECAL; INTRAVASCULAR; INTRAVENOUS; ORAL at 12:43

## 2018-10-25 ENCOUNTER — OFFICE VISIT (OUTPATIENT)
Dept: SURGERY | Age: 83
End: 2018-10-25
Payer: MEDICARE

## 2018-10-25 VITALS
BODY MASS INDEX: 28.32 KG/M2 | WEIGHT: 170 LBS | RESPIRATION RATE: 18 BRPM | SYSTOLIC BLOOD PRESSURE: 148 MMHG | DIASTOLIC BLOOD PRESSURE: 73 MMHG | HEART RATE: 63 BPM | HEIGHT: 65 IN

## 2018-10-25 DIAGNOSIS — K43.2 INCISIONAL HERNIA, WITHOUT OBSTRUCTION OR GANGRENE: Primary | ICD-10-CM

## 2018-10-25 DIAGNOSIS — K59.00 CONSTIPATION, UNSPECIFIED CONSTIPATION TYPE: ICD-10-CM

## 2018-10-25 PROCEDURE — 99212 OFFICE O/P EST SF 10 MIN: CPT | Performed by: SURGERY

## 2018-10-25 PROCEDURE — G8417 CALC BMI ABV UP PARAM F/U: HCPCS | Performed by: SURGERY

## 2018-10-25 PROCEDURE — G8427 DOCREV CUR MEDS BY ELIG CLIN: HCPCS | Performed by: SURGERY

## 2018-10-25 PROCEDURE — 4040F PNEUMOC VAC/ADMIN/RCVD: CPT | Performed by: SURGERY

## 2018-10-25 PROCEDURE — 1101F PT FALLS ASSESS-DOCD LE1/YR: CPT | Performed by: SURGERY

## 2018-10-25 PROCEDURE — 1036F TOBACCO NON-USER: CPT | Performed by: SURGERY

## 2018-10-25 PROCEDURE — 1123F ACP DISCUSS/DSCN MKR DOCD: CPT | Performed by: SURGERY

## 2018-10-25 PROCEDURE — G8482 FLU IMMUNIZE ORDER/ADMIN: HCPCS | Performed by: SURGERY

## 2018-10-26 ENCOUNTER — OFFICE VISIT (OUTPATIENT)
Dept: FAMILY MEDICINE CLINIC | Age: 83
End: 2018-10-26
Payer: MEDICARE

## 2018-10-26 VITALS
HEART RATE: 64 BPM | DIASTOLIC BLOOD PRESSURE: 82 MMHG | BODY MASS INDEX: 28.12 KG/M2 | SYSTOLIC BLOOD PRESSURE: 132 MMHG | WEIGHT: 169 LBS | TEMPERATURE: 98.2 F | OXYGEN SATURATION: 98 %

## 2018-10-26 DIAGNOSIS — J01.01 ACUTE RECURRENT MAXILLARY SINUSITIS: Primary | ICD-10-CM

## 2018-10-26 PROCEDURE — G8427 DOCREV CUR MEDS BY ELIG CLIN: HCPCS | Performed by: NURSE PRACTITIONER

## 2018-10-26 PROCEDURE — 1101F PT FALLS ASSESS-DOCD LE1/YR: CPT | Performed by: NURSE PRACTITIONER

## 2018-10-26 PROCEDURE — 1036F TOBACCO NON-USER: CPT | Performed by: NURSE PRACTITIONER

## 2018-10-26 PROCEDURE — 4040F PNEUMOC VAC/ADMIN/RCVD: CPT | Performed by: NURSE PRACTITIONER

## 2018-10-26 PROCEDURE — G8417 CALC BMI ABV UP PARAM F/U: HCPCS | Performed by: NURSE PRACTITIONER

## 2018-10-26 PROCEDURE — 1123F ACP DISCUSS/DSCN MKR DOCD: CPT | Performed by: NURSE PRACTITIONER

## 2018-10-26 PROCEDURE — 99213 OFFICE O/P EST LOW 20 MIN: CPT | Performed by: NURSE PRACTITIONER

## 2018-10-26 PROCEDURE — G8482 FLU IMMUNIZE ORDER/ADMIN: HCPCS | Performed by: NURSE PRACTITIONER

## 2018-10-26 RX ORDER — CEFDINIR 300 MG/1
300 CAPSULE ORAL 2 TIMES DAILY
Qty: 20 CAPSULE | Refills: 0 | Status: SHIPPED | OUTPATIENT
Start: 2018-10-26 | End: 2018-11-05

## 2018-10-26 ASSESSMENT — ENCOUNTER SYMPTOMS
COUGH: 1
NAUSEA: 0
VOMITING: 0
SINUS PRESSURE: 1
SORE THROAT: 0
SHORTNESS OF BREATH: 0
DIARRHEA: 0

## 2018-10-28 DIAGNOSIS — G25.81 RESTLESS LEG SYNDROME: ICD-10-CM

## 2018-10-29 RX ORDER — ROPINIROLE 0.5 MG/1
TABLET, FILM COATED ORAL
Qty: 30 TABLET | Refills: 2 | Status: SHIPPED | OUTPATIENT
Start: 2018-10-29 | End: 2019-01-25 | Stop reason: SDUPTHER

## 2018-11-04 ASSESSMENT — ENCOUNTER SYMPTOMS
BLOOD IN STOOL: 0
SORE THROAT: 0
BACK PAIN: 0
SHORTNESS OF BREATH: 0
TROUBLE SWALLOWING: 0
NAUSEA: 0
VOMITING: 0
COUGH: 0
CHOKING: 0
ABDOMINAL PAIN: 1

## 2018-11-05 NOTE — PROGRESS NOTES
capsule Take 1 capsule by mouth 2 times daily for 10 days 20 capsule 0     No current facility-administered medications for this visit. Social History     Social History    Marital status:      Spouse name: N/A    Number of children: N/A    Years of education: N/A     Social History Main Topics    Smoking status: Never Smoker    Smokeless tobacco: Never Used    Alcohol use No    Drug use: No    Sexual activity: Not Asked     Other Topics Concern    None     Social History Narrative    None       Objective:   Physical Exam   Constitutional: He is oriented to person, place, and time. He appears well-developed and well-nourished. HENT:   Head: Normocephalic and atraumatic. Mouth/Throat: Oropharynx is clear and moist.   Eyes: Pupils are equal, round, and reactive to light. Conjunctivae and EOM are normal. No scleral icterus. Neck: Normal range of motion. Neck supple. No JVD present. No tracheal deviation present. Cardiovascular: Normal rate and regular rhythm. Pulmonary/Chest: Effort normal and breath sounds normal. No respiratory distress. He exhibits no tenderness. Abdominal: Soft. Bowel sounds are normal. He exhibits no distension and no mass. There is no tenderness. There is no rebound and no guarding. A hernia is present. Hernia confirmed positive in the ventral area. Musculoskeletal: Normal range of motion. He exhibits no edema. Lymphadenopathy:     He has no cervical adenopathy. Neurological: He is alert and oriented to person, place, and time. No cranial nerve deficit. Skin: Skin is warm and dry. No rash noted. No erythema. Psychiatric: He has a normal mood and affect. His behavior is normal. Judgment and thought content normal.   Nursing note and vitals reviewed.        CT ABDOMEN PELVIS W IV CONTRAST Additional Contrast? Oral   Status: Final result   Order Providers     Authorizing Encounter Billing   Desean Rojas MD [facility] CAT SCAN ROOM Desean Rojas MD

## 2018-11-06 DIAGNOSIS — Z01.818 PREOPERATIVE CLEARANCE: Primary | ICD-10-CM

## 2018-11-13 ENCOUNTER — TELEPHONE (OUTPATIENT)
Dept: SURGERY | Age: 83
End: 2018-11-13

## 2018-11-13 NOTE — TELEPHONE ENCOUNTER
Patient referred to Dr Philip Desai for cardiac risk assessment prior to hernia repair. Dr Dom Pimentel office contacted patient to schedule appointment, however, patient stated he would call back in January to schedule appointment.

## 2018-11-16 ENCOUNTER — CARE COORDINATION (OUTPATIENT)
Dept: CARE COORDINATION | Age: 83
End: 2018-11-16

## 2018-11-16 ENCOUNTER — OFFICE VISIT (OUTPATIENT)
Dept: FAMILY MEDICINE CLINIC | Age: 83
End: 2018-11-16
Payer: MEDICARE

## 2018-11-16 VITALS
TEMPERATURE: 97.7 F | DIASTOLIC BLOOD PRESSURE: 62 MMHG | BODY MASS INDEX: 28.29 KG/M2 | OXYGEN SATURATION: 97 % | WEIGHT: 170 LBS | SYSTOLIC BLOOD PRESSURE: 130 MMHG | HEART RATE: 60 BPM

## 2018-11-16 DIAGNOSIS — R09.81 CHRONIC NASAL CONGESTION: Primary | ICD-10-CM

## 2018-11-16 PROCEDURE — 1101F PT FALLS ASSESS-DOCD LE1/YR: CPT | Performed by: NURSE PRACTITIONER

## 2018-11-16 PROCEDURE — 1036F TOBACCO NON-USER: CPT | Performed by: NURSE PRACTITIONER

## 2018-11-16 PROCEDURE — G8427 DOCREV CUR MEDS BY ELIG CLIN: HCPCS | Performed by: NURSE PRACTITIONER

## 2018-11-16 PROCEDURE — G8417 CALC BMI ABV UP PARAM F/U: HCPCS | Performed by: NURSE PRACTITIONER

## 2018-11-16 PROCEDURE — 1123F ACP DISCUSS/DSCN MKR DOCD: CPT | Performed by: NURSE PRACTITIONER

## 2018-11-16 PROCEDURE — 99213 OFFICE O/P EST LOW 20 MIN: CPT | Performed by: NURSE PRACTITIONER

## 2018-11-16 PROCEDURE — G8482 FLU IMMUNIZE ORDER/ADMIN: HCPCS | Performed by: NURSE PRACTITIONER

## 2018-11-16 PROCEDURE — 4040F PNEUMOC VAC/ADMIN/RCVD: CPT | Performed by: NURSE PRACTITIONER

## 2018-11-16 RX ORDER — PSEUDOEPHEDRINE HCL 60 MG/1
60 TABLET ORAL EVERY 6 HOURS PRN
Qty: 120 TABLET | Refills: 2 | Status: SHIPPED | OUTPATIENT
Start: 2018-11-16 | End: 2018-12-16

## 2018-11-16 ASSESSMENT — ENCOUNTER SYMPTOMS
VOMITING: 0
COUGH: 1
NAUSEA: 0
SHORTNESS OF BREATH: 0
SINUS PRESSURE: 1
DIARRHEA: 0

## 2018-11-16 NOTE — PROGRESS NOTES
HPI Notes    Name: Marya Patricia  : 1934         Chief Complaint:     Chief Complaint   Patient presents with    Sinusitis     Patient here today for sinus pressure, head congestion. Patient said he feels burning in his nasal passages. He said he has had this for 5 months. He was seen here on 10/26/18 and treated with omnicef, he said it did not help him. History of Present Illness:        Sinusitis   This is a recurrent problem. The current episode started more than 1 month ago (5 months per pt, but never mentioned it before 10/26/18). The problem has been waxing and waning (pt was treated on 10/26/18) since onset. There has been no fever. The pain is mild (pressure behind nose and eyes). Associated symptoms include congestion, coughing and sinus pressure. Pertinent negatives include no chills or shortness of breath. Past treatments include spray decongestants and antibiotics (omnicef).        Past Medical History:     Past Medical History:   Diagnosis Date    Back pain     Blind right eye     left eye poor vision also    Diabetes mellitus ([facility] [address]     Diverticulosis     Glaucoma     Hernia, ventral     Hypothyroidism (acquired)       Reviewed all health maintenance requirements and ordered appropriate tests  Health Maintenance Due   Topic Date Due    Diabetic foot exam  05/10/1944    DTaP/Tdap/Td vaccine (1 - Tdap) 05/10/1953    Shingles Vaccine (1 of 2 - 2 Dose Series) 05/10/1984    Pneumococcal low/med risk (1 of 2 - PCV13) 05/10/1999    Diabetic retinal exam  05/10/2009    A1C test (Diabetic or Prediabetic)  10/03/2018       Past Surgical History:     Past Surgical History:   Procedure Laterality Date   Jacki Kyle      Dr. Constantine Davis    ERCP  2017    balloon sweep,    ERCP N/A 2017    ERCP ENDOSCOPIC RETROGRADE CHOLANGIOPANCREATOGRAPHY WITH BALLOON SWEEP AND CHOLANGIOGRAM, GI Indications: Leg Cramps José's leg cramp relief 50 quick-dissolving tablets, homeopathic. Yes Historical Provider, MD        Allergies:       Aspirin; Codeine; and Penicillins    Social History:     Tobacco:    reports that he has never smoked. He has never used smokeless tobacco.  Alcohol:      reports that he does not drink alcohol. Drug Use:  reports that he does not use drugs. Family History:     Family History   Problem Relation Age of Onset    Cancer Mother         colon    Cancer Sister         colon    Cancer Other         colon    Cancer Sister         colon       Review of Systems:         Review of Systems   Constitutional: Negative for chills and fever. HENT: Positive for congestion and sinus pressure. Respiratory: Positive for cough. Negative for shortness of breath. Cardiovascular: Negative for chest pain and palpitations. Gastrointestinal: Negative for diarrhea, nausea and vomiting. Physical Exam:     Vitals:  /62   Pulse 60   Temp 97.7 °F (36.5 °C) (Oral)   Wt 170 lb (77.1 kg)   SpO2 97%   BMI 28.29 kg/m²       Physical Exam   Constitutional: Vital signs are normal. He appears well-developed and well-nourished. He is cooperative. He does not appear ill. No distress. HENT:   Right Ear: Tympanic membrane normal.   Left Ear: Tympanic membrane normal.   Nose: Mucosal edema present. Mild pressure with palpation behind nose and under bilat eyes. Cardiovascular: Normal rate, regular rhythm, S1 normal and S2 normal.    Pulmonary/Chest: Effort normal and breath sounds normal. No respiratory distress. Skin: Skin is warm, dry and intact. Nursing note and vitals reviewed.             Data:     Lab Results   Component Value Date     09/10/2018    K 3.8 09/10/2018    CL 95 09/10/2018    CO2 25 09/10/2018    BUN 26 10/19/2018    CREATININE 1.77 10/19/2018    GLUCOSE 163 09/10/2018    GLUCOSE 146 01/10/2012    PROT 6.9 09/10/2018    LABALBU 3.9 09/10/2018

## 2018-11-16 NOTE — CARE COORDINATION
Attempt to contact patient today regarding care coordination, unable to reach. Recent encounters and notes reviewed. Pt in office today for appt concerning nasal congestion, noted is the Plan of Care per [facility] APRN  1. Chronic nasal congestion         Pt has chronic nasal congestion and post nasal drip. Will start on pseudoephedrine 60mg every 6 hours. Pt educated about medication. If symptoms do not improve in 2 weeks, will consider ENT consult.     Patient verbalizes understanding and agreement with plan. All questions answered. If symptoms do not resolve or worsen, return to office. NEXT CALL:  How is patient feeling, are meds helping? F/u on hernia surgery done in October, any issues? Did he make appt with Neuro?     Future Appointments  Date Time Provider Mai Gutierrez   12/14/2018 1:00 PM [facility]  None   2/4/2019 1:30 PM NOHELIA Enriquez - CNP [facility] [address] Luke RN Care Coordinator  [phone]

## 2018-12-06 ENCOUNTER — TELEPHONE (OUTPATIENT)
Dept: FAMILY MEDICINE CLINIC | Age: 83
End: 2018-12-06

## 2018-12-14 ENCOUNTER — CARE COORDINATION (OUTPATIENT)
Dept: CARE COORDINATION | Age: 83
End: 2018-12-14

## 2018-12-15 ASSESSMENT — ENCOUNTER SYMPTOMS
VOMITING: 0
SORE THROAT: 0
NAUSEA: 0
TROUBLE SWALLOWING: 0
CHOKING: 0
ABDOMINAL PAIN: 1
SHORTNESS OF BREATH: 0
COUGH: 0
BACK PAIN: 0
BLOOD IN STOOL: 0

## 2018-12-15 NOTE — PROGRESS NOTES
mouth every evening if needed for (RESTLESS LEG SYNDROME) 30 tablet 2     No current facility-administered medications for this visit. Social History     Social History    Marital status:      Spouse name: N/A    Number of children: N/A    Years of education: N/A     Social History Main Topics    Smoking status: Never Smoker    Smokeless tobacco: Never Used    Alcohol use No    Drug use: No    Sexual activity: Not Asked     Other Topics Concern    None     Social History Narrative    None       Objective:   Physical Exam   Constitutional: He is oriented to person, place, and time. He appears well-developed and well-nourished. HENT:   Head: Normocephalic and atraumatic. Mouth/Throat: Oropharynx is clear and moist.   Eyes: Pupils are equal, round, and reactive to light. Conjunctivae and EOM are normal. No scleral icterus. Neck: Normal range of motion. Neck supple. No JVD present. No tracheal deviation present. Cardiovascular: Normal rate and regular rhythm. Pulmonary/Chest: Effort normal and breath sounds normal. No respiratory distress. He exhibits no tenderness. Abdominal: Soft. Bowel sounds are normal. He exhibits no distension and no mass. There is no tenderness. There is no rebound and no guarding. A hernia is present. Hernia confirmed positive in the ventral area. Musculoskeletal: Normal range of motion. He exhibits no edema. Lymphadenopathy:     He has no cervical adenopathy. Neurological: He is alert and oriented to person, place, and time. No cranial nerve deficit. Skin: Skin is warm and dry. No rash noted. No erythema. Psychiatric: He has a normal mood and affect. His behavior is normal. Judgment and thought content normal.   Nursing note and vitals reviewed.        CT ABDOMEN PELVIS W IV CONTRAST Additional Contrast? Oral   Status: Final result   Order Providers     Authorizing Encounter Billing   Kianna Morris MD [facility] SCAN ROOM Kianna Morris MD        Signed by     Signed Date/Time  Phone Pager   [address] 10/19/2018 15:25 [phone]    [facility]     Read Date Phone Pager   [address] Oct 19, 2018 [phone]    Reviewed By Piotr Russell RN on 10/21/2018 19:00   Logan Yun MD on 10/19/2018 16:13   Routing History     Priority Sent On From To Message Type    10/19/2018  3:27 PM Jj, [facility] Incoming Radiant Results From [facility] [address] Gaudencio Weber MD Results    10/19/2018  3:27 PM Jj, [facility] Incoming Radiant Results From [address] [place] [address], RN CC'd Results   Radiation Dose Estimates     No radiation information found for this patient   Narrative   CT ABDOMEN PELVIS W IV CONTRAST       CLINICAL STATEMENT: K43.2, incisional hernias without obstruction or gangrene.       COMPARISON: CT abdomen and pelvis 8/1/2017.            TECHNIQUE: CT examination of the abdomen and pelvis following the    administration of Isovue-370, 75 mL intravenous contrast. Coronal and sagittal    reformations were performed. Oral contrast was also administered.       Dose reduction techniques were achieved by using automated exposure control    and/or adjustment of mA and/or kV according to patient size and/or use of    iterative reconstruction technique.           FINDINGS: 2 abdominal ventral hernias are again identified around the level of    the umbilicus. The more proximal shows the fascial defect in the midline and    measures 7.1 cm transverse with the hernia measuring 8.8 cm in diameter,    containing unobstructed small bowel loops with contrast.       The slightly more inferior and midline/left-sided hernia measures 3 cm    longitudinal with the fascial defect measuring 3.9 cm and the hernia sac    measuring 5.4 cm transverse and containing opacified small bowel loops, without    obstruction.       There is severe diverticulosis in the descending and sigmoid colon without    diverticulitis.

## 2018-12-28 ENCOUNTER — CARE COORDINATION (OUTPATIENT)
Dept: CARE COORDINATION | Age: 83
End: 2018-12-28

## 2018-12-28 DIAGNOSIS — J30.1 CHRONIC SEASONAL ALLERGIC RHINITIS DUE TO POLLEN: ICD-10-CM

## 2018-12-28 RX ORDER — MONTELUKAST SODIUM 10 MG/1
TABLET ORAL
Qty: 30 TABLET | Refills: 2 | Status: SHIPPED | OUTPATIENT
Start: 2018-12-28 | End: 2019-03-11 | Stop reason: SDUPTHER

## 2019-01-08 RX ORDER — TRAZODONE HYDROCHLORIDE 50 MG/1
TABLET ORAL
Qty: 30 TABLET | Refills: 2 | Status: SHIPPED | OUTPATIENT
Start: 2019-01-08 | End: 2019-04-05 | Stop reason: SDUPTHER

## 2019-01-25 DIAGNOSIS — G25.81 RESTLESS LEG SYNDROME: ICD-10-CM

## 2019-01-25 RX ORDER — ROPINIROLE 0.5 MG/1
TABLET, FILM COATED ORAL
Qty: 30 TABLET | Refills: 2 | Status: SHIPPED | OUTPATIENT
Start: 2019-01-25 | End: 2019-04-20 | Stop reason: SDUPTHER

## 2019-02-04 ENCOUNTER — OFFICE VISIT (OUTPATIENT)
Dept: FAMILY MEDICINE CLINIC | Age: 84
End: 2019-02-04
Payer: MEDICARE

## 2019-02-04 ENCOUNTER — CARE COORDINATION (OUTPATIENT)
Dept: CARE COORDINATION | Age: 84
End: 2019-02-04

## 2019-02-04 ENCOUNTER — HOSPITAL ENCOUNTER (OUTPATIENT)
Age: 84
Setting detail: SPECIMEN
Discharge: HOME | End: 2019-02-04
Payer: MEDICARE

## 2019-02-04 VITALS
TEMPERATURE: 97.3 F | OXYGEN SATURATION: 97 % | WEIGHT: 164.5 LBS | BODY MASS INDEX: 27.37 KG/M2 | SYSTOLIC BLOOD PRESSURE: 118 MMHG | DIASTOLIC BLOOD PRESSURE: 62 MMHG | HEART RATE: 79 BPM

## 2019-02-04 DIAGNOSIS — N18.30 CKD STAGE 3 DUE TO TYPE 2 DIABETES MELLITUS (HCC): ICD-10-CM

## 2019-02-04 DIAGNOSIS — E11.22 CKD STAGE 3 DUE TO TYPE 2 DIABETES MELLITUS (HCC): ICD-10-CM

## 2019-02-04 DIAGNOSIS — J44.9 CHRONIC OBSTRUCTIVE PULMONARY DISEASE, UNSPECIFIED COPD TYPE (HCC): ICD-10-CM

## 2019-02-04 DIAGNOSIS — E55.9 VITAMIN D DEFICIENCY: ICD-10-CM

## 2019-02-04 DIAGNOSIS — E03.9 ACQUIRED HYPOTHYROIDISM: ICD-10-CM

## 2019-02-04 DIAGNOSIS — I10 ESSENTIAL HYPERTENSION, BENIGN: ICD-10-CM

## 2019-02-04 DIAGNOSIS — G25.81 RESTLESS LEG SYNDROME: ICD-10-CM

## 2019-02-04 DIAGNOSIS — B96.89 ACUTE BACTERIAL SINUSITIS: ICD-10-CM

## 2019-02-04 DIAGNOSIS — Z12.5 PROSTATE CANCER SCREENING: ICD-10-CM

## 2019-02-04 DIAGNOSIS — H54.40 BLIND RIGHT EYE: ICD-10-CM

## 2019-02-04 DIAGNOSIS — E11.65 UNCONTROLLED TYPE 2 DIABETES MELLITUS WITH HYPERGLYCEMIA (HCC): Primary | ICD-10-CM

## 2019-02-04 DIAGNOSIS — J01.90 ACUTE BACTERIAL SINUSITIS: ICD-10-CM

## 2019-02-04 DIAGNOSIS — E78.00 PURE HYPERCHOLESTEROLEMIA: ICD-10-CM

## 2019-02-04 LAB
CREATININE URINE: 182.8 MG/DL (ref 39–259)
HBA1C MFR BLD: 8.1 %
MICROALBUMIN/CREAT 24H UR: 118 MG/L
MICROALBUMIN/CREAT UR-RTO: 65 MCG/MG CREAT

## 2019-02-04 PROCEDURE — 99214 OFFICE O/P EST MOD 30 MIN: CPT | Performed by: NURSE PRACTITIONER

## 2019-02-04 PROCEDURE — G8926 SPIRO NO PERF OR DOC: HCPCS | Performed by: NURSE PRACTITIONER

## 2019-02-04 PROCEDURE — G8417 CALC BMI ABV UP PARAM F/U: HCPCS | Performed by: NURSE PRACTITIONER

## 2019-02-04 PROCEDURE — 1101F PT FALLS ASSESS-DOCD LE1/YR: CPT | Performed by: NURSE PRACTITIONER

## 2019-02-04 PROCEDURE — 1123F ACP DISCUSS/DSCN MKR DOCD: CPT | Performed by: NURSE PRACTITIONER

## 2019-02-04 PROCEDURE — 82043 UR ALBUMIN QUANTITATIVE: CPT

## 2019-02-04 PROCEDURE — 83036 HEMOGLOBIN GLYCOSYLATED A1C: CPT | Performed by: NURSE PRACTITIONER

## 2019-02-04 PROCEDURE — G8482 FLU IMMUNIZE ORDER/ADMIN: HCPCS | Performed by: NURSE PRACTITIONER

## 2019-02-04 PROCEDURE — G8427 DOCREV CUR MEDS BY ELIG CLIN: HCPCS | Performed by: NURSE PRACTITIONER

## 2019-02-04 PROCEDURE — 82570 ASSAY OF URINE CREATININE: CPT

## 2019-02-04 PROCEDURE — 4040F PNEUMOC VAC/ADMIN/RCVD: CPT | Performed by: NURSE PRACTITIONER

## 2019-02-04 PROCEDURE — 3023F SPIROM DOC REV: CPT | Performed by: NURSE PRACTITIONER

## 2019-02-04 PROCEDURE — 1036F TOBACCO NON-USER: CPT | Performed by: NURSE PRACTITIONER

## 2019-02-04 RX ORDER — AZITHROMYCIN 250 MG/1
TABLET, FILM COATED ORAL
Qty: 1 PACKET | Refills: 0 | Status: SHIPPED | OUTPATIENT
Start: 2019-02-04 | End: 2019-02-14

## 2019-02-04 RX ORDER — LISINOPRIL 2.5 MG/1
2.5 TABLET ORAL DAILY
Qty: 30 TABLET | Refills: 2 | Status: SHIPPED | OUTPATIENT
Start: 2019-02-04 | End: 2019-05-13

## 2019-02-04 ASSESSMENT — ENCOUNTER SYMPTOMS
COUGH: 1
RHINORRHEA: 1
VOMITING: 0
EYE PAIN: 0
DIARRHEA: 0
SHORTNESS OF BREATH: 1
EYE DISCHARGE: 1
NAUSEA: 0
SINUS PRESSURE: 1
EYE ITCHING: 0
CONSTIPATION: 0

## 2019-03-04 ENCOUNTER — CARE COORDINATION (OUTPATIENT)
Dept: CARE COORDINATION | Age: 84
End: 2019-03-04

## 2019-03-06 ENCOUNTER — TELEPHONE (OUTPATIENT)
Dept: FAMILY MEDICINE CLINIC | Age: 84
End: 2019-03-06

## 2019-03-06 RX ORDER — ALBUTEROL SULFATE 90 UG/1
2 AEROSOL, METERED RESPIRATORY (INHALATION) EVERY 6 HOURS PRN
Qty: 3 INHALER | Refills: 3 | Status: SHIPPED | OUTPATIENT
Start: 2019-03-06 | End: 2020-01-01 | Stop reason: ALTCHOICE

## 2019-03-08 DIAGNOSIS — K21.9 GASTROESOPHAGEAL REFLUX DISEASE WITHOUT ESOPHAGITIS: ICD-10-CM

## 2019-03-08 RX ORDER — PANTOPRAZOLE SODIUM 20 MG/1
TABLET, DELAYED RELEASE ORAL
Qty: 90 TABLET | Refills: 1 | Status: SHIPPED | OUTPATIENT
Start: 2019-03-08 | End: 2019-05-13

## 2019-03-11 ENCOUNTER — OFFICE VISIT (OUTPATIENT)
Dept: FAMILY MEDICINE CLINIC | Age: 84
End: 2019-03-11
Payer: MEDICARE

## 2019-03-11 VITALS
BODY MASS INDEX: 26.63 KG/M2 | WEIGHT: 160 LBS | DIASTOLIC BLOOD PRESSURE: 70 MMHG | HEART RATE: 70 BPM | OXYGEN SATURATION: 98 % | SYSTOLIC BLOOD PRESSURE: 124 MMHG | TEMPERATURE: 97.9 F

## 2019-03-11 DIAGNOSIS — R09.82 PND (POST-NASAL DRIP): ICD-10-CM

## 2019-03-11 DIAGNOSIS — J30.1 CHRONIC SEASONAL ALLERGIC RHINITIS DUE TO POLLEN: Primary | ICD-10-CM

## 2019-03-11 PROCEDURE — 1036F TOBACCO NON-USER: CPT | Performed by: NURSE PRACTITIONER

## 2019-03-11 PROCEDURE — G8427 DOCREV CUR MEDS BY ELIG CLIN: HCPCS | Performed by: NURSE PRACTITIONER

## 2019-03-11 PROCEDURE — 1101F PT FALLS ASSESS-DOCD LE1/YR: CPT | Performed by: NURSE PRACTITIONER

## 2019-03-11 PROCEDURE — G8482 FLU IMMUNIZE ORDER/ADMIN: HCPCS | Performed by: NURSE PRACTITIONER

## 2019-03-11 PROCEDURE — G8417 CALC BMI ABV UP PARAM F/U: HCPCS | Performed by: NURSE PRACTITIONER

## 2019-03-11 PROCEDURE — 1123F ACP DISCUSS/DSCN MKR DOCD: CPT | Performed by: NURSE PRACTITIONER

## 2019-03-11 PROCEDURE — 99213 OFFICE O/P EST LOW 20 MIN: CPT | Performed by: NURSE PRACTITIONER

## 2019-03-11 PROCEDURE — 4040F PNEUMOC VAC/ADMIN/RCVD: CPT | Performed by: NURSE PRACTITIONER

## 2019-03-11 RX ORDER — FLUTICASONE PROPIONATE 50 MCG
1 SPRAY, SUSPENSION (ML) NASAL DAILY
Qty: 1 BOTTLE | Refills: 5 | Status: SHIPPED | OUTPATIENT
Start: 2019-03-11 | End: 2019-08-08 | Stop reason: ALTCHOICE

## 2019-03-11 RX ORDER — MONTELUKAST SODIUM 10 MG/1
TABLET ORAL
Qty: 30 TABLET | Refills: 2 | Status: SHIPPED | OUTPATIENT
Start: 2019-03-11 | End: 2019-05-13

## 2019-03-11 ASSESSMENT — ENCOUNTER SYMPTOMS
VOMITING: 0
HEARTBURN: 1
NAUSEA: 0
COUGH: 1
SHORTNESS OF BREATH: 0
DIARRHEA: 0

## 2019-03-20 ENCOUNTER — CARE COORDINATION (OUTPATIENT)
Dept: CARE COORDINATION | Age: 84
End: 2019-03-20

## 2019-04-05 RX ORDER — TRAZODONE HYDROCHLORIDE 50 MG/1
TABLET ORAL
Qty: 30 TABLET | Refills: 2 | Status: SHIPPED | OUTPATIENT
Start: 2019-04-05 | End: 2019-07-08 | Stop reason: SDUPTHER

## 2019-04-15 ENCOUNTER — OFFICE VISIT (OUTPATIENT)
Dept: FAMILY MEDICINE CLINIC | Age: 84
End: 2019-04-15
Payer: MEDICARE

## 2019-04-15 ENCOUNTER — CARE COORDINATION (OUTPATIENT)
Dept: CARE COORDINATION | Age: 84
End: 2019-04-15

## 2019-04-15 VITALS
WEIGHT: 162.4 LBS | HEART RATE: 61 BPM | TEMPERATURE: 98.3 F | OXYGEN SATURATION: 97 % | DIASTOLIC BLOOD PRESSURE: 66 MMHG | SYSTOLIC BLOOD PRESSURE: 132 MMHG | BODY MASS INDEX: 27.02 KG/M2

## 2019-04-15 DIAGNOSIS — M46.1 SACROILIITIS (HCC): ICD-10-CM

## 2019-04-15 DIAGNOSIS — M54.50 ACUTE LEFT-SIDED LOW BACK PAIN WITHOUT SCIATICA: Primary | ICD-10-CM

## 2019-04-15 PROCEDURE — 99213 OFFICE O/P EST LOW 20 MIN: CPT | Performed by: NURSE PRACTITIONER

## 2019-04-15 PROCEDURE — 1123F ACP DISCUSS/DSCN MKR DOCD: CPT | Performed by: NURSE PRACTITIONER

## 2019-04-15 PROCEDURE — 1036F TOBACCO NON-USER: CPT | Performed by: NURSE PRACTITIONER

## 2019-04-15 PROCEDURE — G8417 CALC BMI ABV UP PARAM F/U: HCPCS | Performed by: NURSE PRACTITIONER

## 2019-04-15 PROCEDURE — 4040F PNEUMOC VAC/ADMIN/RCVD: CPT | Performed by: NURSE PRACTITIONER

## 2019-04-15 PROCEDURE — G8427 DOCREV CUR MEDS BY ELIG CLIN: HCPCS | Performed by: NURSE PRACTITIONER

## 2019-04-15 RX ORDER — PREDNISONE 10 MG/1
TABLET ORAL
Qty: 15 TABLET | Refills: 0 | Status: SHIPPED | OUTPATIENT
Start: 2019-04-15 | End: 2019-04-30 | Stop reason: ALTCHOICE

## 2019-04-15 ASSESSMENT — PATIENT HEALTH QUESTIONNAIRE - PHQ9
2. FEELING DOWN, DEPRESSED OR HOPELESS: 0
SUM OF ALL RESPONSES TO PHQ QUESTIONS 1-9: 0
1. LITTLE INTEREST OR PLEASURE IN DOING THINGS: 0
SUM OF ALL RESPONSES TO PHQ QUESTIONS 1-9: 0
SUM OF ALL RESPONSES TO PHQ9 QUESTIONS 1 & 2: 0

## 2019-04-15 NOTE — PROGRESS NOTES
eyeball    OTHER SURGICAL HISTORY  05-20-16    Laparoscopic selena. converted to open selena.  TONSILLECTOMY         Family History   Problem Relation Age of Onset    Cancer Mother         colon    Cancer Sister         colon    Cancer Other         colon    Cancer Sister         colon          Social History     Tobacco Use    Smoking status: Never Smoker    Smokeless tobacco: Never Used   Substance Use Topics    Alcohol use: No      Current Outpatient Medications   Medication Sig Dispense Refill    predniSONE (DELTASONE) 10 MG tablet Take 2 pills daily x 5 days then 1 pill daily x 5 days take with food for sacroiliitis 15 tablet 0    traZODone (DESYREL) 50 MG tablet take 1 tablet by mouth every evening 30 tablet 2    montelukast (SINGULAIR) 10 MG tablet take 1 tablet by mouth once daily 30 tablet 2    fluticasone (FLONASE) 50 MCG/ACT nasal spray 1 spray by Each Nare route daily 1 Spray in each nostril 1 Bottle 5    pantoprazole (PROTONIX) 20 MG tablet take 1 tablet by mouth once daily 90 tablet 1    albuterol sulfate HFA (PROVENTIL HFA) 108 (90 Base) MCG/ACT inhaler Inhale 2 puffs into the lungs every 6 hours as needed for Wheezing 3 Inhaler 3    lisinopril (PRINIVIL;ZESTRIL) 2.5 MG tablet Take 1 tablet by mouth daily 30 tablet 2    rOPINIRole (REQUIP) 0.5 MG tablet take 1 tablet by mouth every evening if needed for RESTLESS LEG SYNDROME 30 tablet 2    simvastatin (ZOCOR) 10 MG tablet Take 1 tablet by mouth nightly 90 tablet 1    albuterol (PROVENTIL) (2.5 MG/3ML) 0.083% nebulizer solution Take 2.5 mg by nebulization every 4 hours as needed for Wheezing      glucose blood VI test strips (EXACTECH TEST) strip 1 each by In Vitro route 3 times daily As needed. 300 each 3    NONFORMULARY Indications: Milk of magnesia.       senna (SENOKOT) 8.6 MG tablet Take 1 tablet by mouth daily Patient takes 1 tablet daily and additional pill PRN.      NONFORMULARY Take 1 tablet by mouth as needed (Leg cramp relief) Indications: Leg Cramps José's leg cramp relief 50 quick-dissolving tablets, homeopathic. No current facility-administered medications for this visit. Allergies   Allergen Reactions    Aspirin Other (See Comments)     States GI upset only with more than one low dose aspirin    Codeine Itching    Penicillins        Health Maintenance   Topic Date Due    DTaP/Tdap/Td vaccine (1 - Tdap) 05/10/1953    Shingles Vaccine (1 of 2) 05/10/1984    Pneumococcal 65+ years Vaccine (1 of 2 - PCV13) 05/10/1999    Diabetic retinal exam  05/10/2009    TSH testing  04/03/2019    Lipid screen  04/03/2019    A1C test (Diabetic or Prediabetic)  08/04/2019    Potassium monitoring  09/10/2019    Creatinine monitoring  10/19/2019    Diabetic foot exam  02/05/2020    Flu vaccine  Completed       Subjective:      Review of Systems   Constitutional: Negative for activity change, chills and fever. HENT: Negative for congestion, rhinorrhea and sore throat. Eyes: Negative. Respiratory: Negative for cough. Cardiovascular: Negative for chest pain. Gastrointestinal: Negative for abdominal distention. Genitourinary: Negative for difficulty urinating. Musculoskeletal: Positive for arthralgias and back pain. Neurological: Negative for dizziness. Objective:     Physical Exam   Constitutional: He is oriented to person, place, and time. He appears well-developed and well-nourished. No distress. HENT:   Head: Normocephalic and atraumatic. Left Ear: External ear normal.   Mouth/Throat: Oropharynx is clear and moist. No oropharyngeal exudate. Eyes: Pupils are equal, round, and reactive to light. EOM are normal.   Neck: Normal range of motion. Neck supple. Cardiovascular: Normal rate, regular rhythm, normal heart sounds and normal pulses. No murmur heard. Pulmonary/Chest: Effort normal and breath sounds normal. No respiratory distress. Abdominal: Soft. He exhibits no mass.  There is no tenderness. Musculoskeletal: Normal range of motion. He exhibits tenderness (left SI joint tenderness and L3-L5 paraspinal tenderness). Lymphadenopathy:     He has no cervical adenopathy. Neurological: He is alert and oriented to person, place, and time. Skin: Skin is warm and dry. No rash noted. Psychiatric: He has a normal mood and affect. His behavior is normal. Judgment and thought content normal.   Nursing note and vitals reviewed. /66 (Site: Right Upper Arm, Position: Sitting, Cuff Size: Medium Adult)   Pulse 61   Temp 98.3 °F (36.8 °C) (Oral)   Wt 162 lb 6.4 oz (73.7 kg)   SpO2 97%   BMI 27.02 kg/m²     Data:     Lab Results   Component Value Date     09/10/2018    K 3.8 09/10/2018    CL 95 09/10/2018    CO2 25 09/10/2018    BUN 26 10/19/2018    CREATININE 1.77 10/19/2018    GLUCOSE 163 09/10/2018    GLUCOSE 146 01/10/2012    PROT 6.9 09/10/2018    LABALBU 3.9 09/10/2018    LABALBU 4.4 01/10/2012    BILITOT 0.42 09/10/2018    ALKPHOS 86 09/10/2018    AST 28 09/10/2018    ALT 12 09/10/2018     Lab Results   Component Value Date    WBC 5.2 09/10/2018    RBC 3.31 09/10/2018    RBC 4.07 01/10/2012    HGB 10.4 09/10/2018    HCT 30.5 09/10/2018    MCV 92.3 09/10/2018    MCH 31.4 09/10/2018    MCHC 34.0 09/10/2018    RDW 12.5 09/10/2018     09/10/2018     01/10/2012    MPV NOT REPORTED 09/10/2018     Lab Results   Component Value Date    TSH 3.07 04/03/2018     Lab Results   Component Value Date    CHOL 114 04/03/2018    HDL 60 04/03/2018    PSA 0.40 09/15/2017    LABA1C 8.1 02/04/2019          Assessment & Plan       1. Acute left-sided low back pain without sciatica    Gentle stretching  Will give steroid taper take with food. - XR LUMBAR SPINE (MIN 4 VIEWS); Future    2. Sacroiliitis ([address]  Consider xray and physical therapy   No radiculopathy or neurological management. - XR LUMBAR SPINE (MIN 4 VIEWS);  Future                  Completed Refills   Requested Prescriptions     Signed Prescriptions Disp Refills    predniSONE (DELTASONE) 10 MG tablet 15 tablet 0     Sig: Take 2 pills daily x 5 days then 1 pill daily x 5 days take with food for sacroiliitis     No follow-ups on file. Orders Placed This Encounter   Medications    predniSONE (DELTASONE) 10 MG tablet     Sig: Take 2 pills daily x 5 days then 1 pill daily x 5 days take with food for sacroiliitis     Dispense:  15 tablet     Refill:  0     Orders Placed This Encounter   Procedures    XR LUMBAR SPINE (MIN 4 VIEWS)     Standing Status:   Future     Standing Expiration Date:   4/15/2020     Order Specific Question:   Reason for exam:     Answer:   left low back and SI Joint tenderness. Clif received counseling on the following healthy behaviors: nutrition, exercise and medication adherence  Reviewed prior labs and health maintenance. Continue current medications, diet and exercise. Discussed use, benefit, and side effects of prescribed medications. Barriers to medication compliance addressed. Patient given educational materials - see patient instructions. All patient questions answered. Patient voiced understanding.           Electronically signed by NOHELIA Vinson CNP on 4/16/2019 at 10:04 PM

## 2019-04-15 NOTE — PATIENT INSTRUCTIONS
SURVEY:    You may be receiving a survey from [employer] regarding your visit today. Please complete the survey to enable us to provide the highest quality of care to you and your family. If you cannot score us a very good on any question, please call the office to discuss how we could have made your experience a very good one. Thank you.

## 2019-04-15 NOTE — CARE COORDINATION
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 Reason For Call: Review blood sugars, diet, changes in symptoms COPD, DM. Assess for new needs. Summary: Pt states he is having back pain, ongoing x 2 months, becoming very bothersome affecting his sleeping at night, set up appt for tonight at 7pm, need to call him back to see if nephew can take him. Called patient back, he his able to make appt tonight at 7pm  Discussed sinus infection, states cleared up , flonase and claritin have been great and helpful. Pt states BS are better since he stopped eating [employer], \"I just couldn't believe it\"       Patient history; reason for CC:       Episode Started: 8/1/2017  COPD, DM, CKD    Last PCP appt / notes reviewed: 2-4-19 return in 5 months  ·  Cut back on sweets, foot exam completed, labs ordered- zpack for bacterial sinusitis    Specialist appt / notes reviewed:  Neurology; Jorge Arambula 12/14/18, return in 2 weeks, no notes noted  EMG; NCS bilateral for assessment of neuropathy; Carotid US; Fall Precautions; Use Cane; Labs, CTA head and neck. General surgeon 11/13/18 - Pt was seen for ventral hernia  Was told to see Cardiology for clearance, pt did not make appt told [facility] office \"will be back in Jan\", no appt noted. Diabetes Education: Attempted to schedule patient 3/26, 4/19, pt was not ready, no follow up since. ED visits or Hospitalizations? Patient has had 0 ED visits, and 0 hospitalizations since last CC contact. Last ED visit 9/10/2018, last hospitalization none in the last 12 months    Medications Reviewed with today: Yes  Patient verbalizes that he has all of medications; and denies needing any assistance currently. Possible Social Deterrents Reviewed:   · Adequate food/ Transportation?  Yes; Azael Sparks, family for transportation    · Is patient independent with ADL's/IADL'S? 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  (Nutrition/Exercise/Self-Monitoring):  No   Have you ever had to go to the ED for symptoms of low blood sugar?:  No       Do you have hyperglycemia symptoms?:  No   Do you have hypoglycemia symptoms?:  No   Last Blood Sugar Value:  90      ,   COPD Assessment    Does the patient understand envrionmental exposure?:  Yes  Is the patient able to verbalize Rescue vs. Long Acting medications?:  Yes  Does the patient have a nebulizer?:  No  Does the patient use a space with inhaled medications?:  No     No patient-reported symptoms         Symptoms:          and   General Assessment    Do you have any symptoms that are causing concern?:  Yes  Progression since Onset:  Gradually Worsening  Reported Symptoms:  Other (Comment: Back Pain)           Health Maintenance Due   Topic Date Due    DTaP/Tdap/Td vaccine (1 - Tdap) 05[birth year]10/1953    Shingles Vaccine (1 of 2) 05/10/1984    Pneumococcal 65+ years Vaccine (1 of 2 - PCV13) 05/10/1999    Diabetic retinal exam  05/10/2009    TSH testing  04/03/2019    Lipid screen  04/03/2019         Care Coordination Interventions    Program Enrollment:  Rising Risk  Referral from Primary Care Provider:  No  Suggested Interventions and Community Resources  Assocation for Blind:  Not Started  Diabetes Education:  Declined (Comment: Pt referred to [facility] ED, has not agreed to appt)  Fall Risk Prevention:  Completed  Senior Services:  Not Started  Transportation Support:  Completed  Zone Management Tools:  Completed         Goals Addressed This Visit's Progress     Medication Management   On track     I will take my medication as directed. I will notify my provider of any problems with medications, like adverse effects or side effects. I will notify my provider/Care Coordinator if I am unable to afford my medications. I will notify my provider for advice before I stop taking any of my medication. Barriers: impairment:  visual  Plan for overcoming my barriers: Patient will use magnifying glass to read and will participate in care coordination. Confidence: 7/10  Anticipated Goal Completion Date: 06/30/2018.  Nutrition Plan   On track     I will avoid eating sweets and will follow a diet low in carbohydrates. Barriers: lack of support and lack of education  Plan for overcoming my barriers: Patient will meet with Diabetic educator and dietician; Patient will participate in care coordination. Confidence: 8/10  Anticipated Goal Completion Date: 06/30/2018            Prior to Admission medications    Medication Sig Start Date End Date Taking?  Authorizing Provider   traZODone (DESYREL) 50 MG tablet take 1 tablet by mouth every evening 4/5/19   NOHELIA Briceno CNP   montelukast (SINGULAIR) 10 MG tablet take 1 tablet by mouth once daily 3/11/19   NOHELIA Cobian CNP   fluticasone (FLONASE) 50 MCG/ACT nasal spray 1 spray by Each Nare route daily 1 Spray in each nostril 3/11/19   NOHELIA Cobian CNP   pantoprazole (PROTONIX) 20 MG tablet take 1 tablet by mouth once daily 3/8/19   NOHELIA Briceno CNP   albuterol sulfate HFA (PROVENTIL HFA) 108 (90 Base) MCG/ACT inhaler Inhale 2 puffs into the lungs every 6 hours as needed for Wheezing 3/6/19   NOHELIA Briceno CNP   lisinopril (PRINIVIL;ZESTRIL) 2.5 MG tablet Take 1 tablet by mouth daily 2/4/19   NOHELIA Briceno CNP   rOPINIRole (REQUIP) 0.5 MG tablet take 1 tablet by mouth every evening if needed for RESTLESS LEG SYNDROME 1/25/19   NOHELIA Briceno - CNP   simvastatin (ZOCOR) 10 MG tablet Take 1 tablet by mouth nightly 10/8/18   NOHELIA Adair CNP   albuterol (PROVENTIL) (2.5 MG/3ML) 0.083% nebulizer solution Take 2.5 mg by nebulization every 4 hours as needed for Wheezing    Historical Provider, MD   glucose blood VI test strips (EXACTECH TEST) strip 1 each by In Vitro route 3 times daily As needed. 3/26/18   NOHELIA Adair CNP   NONFORMULARY Indications: Milk of magnesia. Historical Provider, MD   senna (SENOKOT) 8.6 MG tablet Take 1 tablet by mouth daily Patient takes 1 tablet daily and additional pill PRN. Historical Provider, MD   NONFORMULARY Take 1 tablet by mouth as needed (Leg cramp relief) Indications: Leg Cramps José's leg cramp relief 50 quick-dissolving tablets, homeopathic.     Historical Provider, MD       Future Appointments   Date Time Provider Mai Gutierrez   4/15/2019  7:00 PM Yoanna Porch, APRN - CNP [facility]   5/13/2019  2:30 PM Yoanna Porch, APRN - CNP [facility]

## 2019-04-16 PROBLEM — M46.1 SACROILIITIS (HCC): Status: ACTIVE | Noted: 2019-04-16

## 2019-04-16 ASSESSMENT — ENCOUNTER SYMPTOMS
COUGH: 0
SORE THROAT: 0
ABDOMINAL DISTENTION: 0
EYES NEGATIVE: 1
BACK PAIN: 1
RHINORRHEA: 0

## 2019-04-20 DIAGNOSIS — G25.81 RESTLESS LEG SYNDROME: ICD-10-CM

## 2019-04-22 RX ORDER — ROPINIROLE 0.5 MG/1
TABLET, FILM COATED ORAL
Qty: 30 TABLET | Refills: 2 | Status: SHIPPED | OUTPATIENT
Start: 2019-04-22 | End: 2019-07-18 | Stop reason: SDUPTHER

## 2019-04-22 NOTE — TELEPHONE ENCOUNTER
Last visit:  4/15/2019  Next Visit Date:    Future Appointments   Date Time Provider Mai Gutierrez   5/13/2019  2:30 PM NOHELIA Villa CNP [facility]     Last Med refill:    Medication List:  Prior to Admission medications    Medication Sig Start Date End Date Taking? Authorizing Provider   predniSONE (DELTASONE) 10 MG tablet Take 2 pills daily x 5 days then 1 pill daily x 5 days take with food for sacroiliitis 4/15/19   NOHELIA Villa CNP   traZODone (DESYREL) 50 MG tablet take 1 tablet by mouth every evening 4/5/19   NOHELIA Villa CNP   montelukast (SINGULAIR) 10 MG tablet take 1 tablet by mouth once daily 3/11/19   Candice NOHELIA Suarez CNP   fluticasone (FLONASE) 50 MCG/ACT nasal spray 1 spray by Each Nare route daily 1 Spray in each nostril 3/11/19   Candice NOHELIA Suarez CNP   pantoprazole (PROTONIX) 20 MG tablet take 1 tablet by mouth once daily 3/8/19   NOHELIA Villa CNP   albuterol sulfate HFA (PROVENTIL HFA) 108 (90 Base) MCG/ACT inhaler Inhale 2 puffs into the lungs every 6 hours as needed for Wheezing 3/6/19   NOHELIA Villa CNP   lisinopril (PRINIVIL;ZESTRIL) 2.5 MG tablet Take 1 tablet by mouth daily 2/4/19   NOHELIA Villa CNP   rOPINIRole (REQUIP) 0.5 MG tablet take 1 tablet by mouth every evening if needed for RESTLESS LEG SYNDROME 1/25/19   NOHELIA Villa CNP   simvastatin (ZOCOR) 10 MG tablet Take 1 tablet by mouth nightly 10/8/18   NOHELIA Villa CNP   albuterol (PROVENTIL) (2.5 MG/3ML) 0.083% nebulizer solution Take 2.5 mg by nebulization every 4 hours as needed for Wheezing    Historical Provider, MD   glucose blood VI test strips (EXACTECH TEST) strip 1 each by In Vitro route 3 times daily As needed. 3/26/18   NOHELIA Villa CNP   NONFORMULARY Indications: Milk of magnesia.     Historical Provider, MD   senna (SENOKOT) 8.6 MG tablet Take 1 tablet by mouth daily Patient takes 1 tablet daily and additional pill PRN. Historical Provider, MD   NONFORMULARY Take 1 tablet by mouth as needed (Leg cramp relief) Indications: Leg Cramps José's leg cramp relief 50 quick-dissolving tablets, homeopathic. Historical Provider, MD       Allergies:  Aspirin; Codeine; and Penicillins    Hemoglobin A1C (%)   Date Value   02/04/2019 8.1   04/03/2018 7.4 (H)   03/12/2018 7.9             ( goal A1C is < 7)   Microalb/Crt.  Ratio (mcg/mg creat)   Date Value   02/04/2019 65 (H)     LDL Cholesterol (mg/dL)   Date Value   04/03/2018 42   06/27/2017 57       (goal LDL is <100)   AST (U/L)   Date Value   09/10/2018 28     ALT (U/L)   Date Value   09/10/2018 12     BUN (mg/dL)   Date Value   10/19/2018 26 (H)     BP Readings from Last 3 Encounters:   04/15/19 132/66   03/11/19 124/70   02/04/19 118/62          (goal 120/80)

## 2019-04-23 ENCOUNTER — CARE COORDINATION (OUTPATIENT)
Dept: CARE COORDINATION | Age: 84
End: 2019-04-23

## 2019-04-23 NOTE — CARE COORDINATION
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 Review blood sugars, diet, changes in symptoms COPD, DM. Assess for new needs. Summary: Pt states his back pain is much better, he is able to sleep at night now, he hopes it stays this way, his blood sugars are a little elevated due to steroid but otherwise ok, he was using Flonase for sinuses which worked great but made his nose bleed so he stopped using. Patient history; reason for CC:       Episode Started: 8/1/2017  COPD, DM, CKD    · Last PCP appt / notes reviewed: 4/15/19 - f/u 5/13/19  Acute left-sided low back pain without sciatica     Gentle stretching  Will give steroid taper take with food.      - XR LUMBAR SPINE (MIN 4 VIEWS); Future     2. Sacroiliitis ([address]  Consider xray and physical therapy   No radiculopathy or neurological management. - XR LUMBAR SPINE (MIN 4 VIEWS); Future    Specialist appt / notes reviewed:  Neurology; Navarro Cancel 12/14/18, return in 2 weeks, no notes noted  EMG; NCS bilateral for assessment of neuropathy; Carotid US; Fall Precautions; Use Cane; Labs, CTA head and neck. General surgeon 11/13/18 - Pt was seen for ventral hernia  Was told to see Cardiology for clearance, pt [facility]t [facility]\", no appt noted. Diabetes Education: Attempted to schedule patient 3/26, 4/19, pt was not ready, no follow up since. ED visits or Hospitalizations? Patient has had 0 ED visits, and 0 hospitalizations since last CC contact. Last ED visit 9/10/2018, last hospitalization none in the last 12 months    Medications Reviewed with today: Yes  Patient verbalizes that he has all of medications; and denies needing any assistance currently.       Possible Social Deterrents Reviewed:   · Adequate food/ Transportation? Yes; Nancy Bales, family for transportation    · Is patient independent with ADL's/IADL'S? 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 (Nutrition/Exercise/Self-Monitoring):  No   Have you ever had to go to the ED for symptoms of low blood sugar?:  No       Do you have hyperglycemia symptoms?:  No   Do you have hypoglycemia symptoms?:  No   Last Blood Sugar Value:  100   Blood Sugar Monitoring Regimen:  Once a Day      ,   COPD Assessment    Does the patient understand envrionmental exposure?:  Yes  Is the patient able to verbalize Rescue vs. Long Acting medications?:  Yes  Does the patient have a nebulizer?:  No  Does the patient use a space with inhaled medications?:  No     No patient-reported symptoms         Symptoms:      Have you had a recent diagnosis of pneumonia either by PCP or at a hospital?:  No      and   General Assessment    Do you have any symptoms that are causing concern?:  No       Diabetes Assessment    Medic Alert ID:  No  Meal Planning:  Avoidance of concentrated sweets   How often do you test your blood sugar?:  Daily   Do you have barriers with adherence to non-pharmacologic self-management interventions?  (Nutrition/Exercise/Self-Monitoring):  No   Have you ever had to go to the ED for symptoms of low blood sugar?:  No       Do you have hyperglycemia symptoms?:  No   Do you have hypoglycemia symptoms?:  No   Last Blood Sugar Value:  100   Blood Sugar Monitoring Regimen:  Once a Day          Health Maintenance Due   Topic Date Due    DTaP/Tdap/Td vaccine (1 - Tdap) 05/10/1953    Shingles Vaccine (1 of 2) 05/10/1984    Pneumococcal 65+ years Vaccine (1 of 2 - PCV13) 05/10/1999    Diabetic retinal exam  05/10/2009    TSH testing  04/03/2019    Lipid screen  04/03/2019         Care Coordination Interventions    Program Enrollment:  Rising Risk  Referral from Primary Care Provider:  No  Suggested Interventions and Community Resources  Assocation for Blind:  Not Started  Diabetes Education:  Declined (Comment: Pt referred to [facility], has not agreed to appt)  Fall Risk Prevention:  Completed  Senior Services:  Not Started  Transportation Support:  Completed  Zone Management Tools:  Completed         Goals Addressed                 This Visit's Progress     Medication Management   On track     I will take my medication as directed. I will notify my provider of any problems with medications, like adverse effects or side effects. I will notify my provider/Care Coordinator if I am unable to afford my medications. I will notify my provider for advice before I stop taking any of my medication. Barriers: impairment:  visual  Plan for overcoming my barriers: Patient will use magnifying glass to read and will participate in care coordination. Confidence: 7/10  Anticipated Goal Completion Date: 06/30/2018.  Nutrition Plan   On track     I will avoid eating sweets and will follow a diet low in carbohydrates. Barriers: lack of support and lack of education  Plan for overcoming my barriers: Patient will meet with Diabetic educator and dietician; Patient will participate in care coordination. Confidence: 8/10  Anticipated Goal Completion Date: 06/30/2018            Prior to Admission medications    Medication Sig Start Date End Date Taking?  Authorizing Provider   rOPINIRole (REQUIP) 0.5 MG tablet take 1 tablet by mouth every evening if needed for RESTLESS LEG SYNDROME 4/22/19   NOHELIA Hayes - CNP   predniSONE (DELTASONE) 10 MG tablet Take 2 pills daily x 5 days then 1 pill daily x 5 days take with food for sacroiliitis 4/15/19 [facility]   traZODone (DESYREL) 50 MG tablet take 1 tablet by mouth every evening 4/5/19   [facility]   montelukast (SINGULAIR) 10 MG tablet take 1 tablet by mouth once daily 3/11/19   NOHELIA Mcintyre CNP   fluticasone (FLONASE) 50 MCG/ACT nasal spray 1 spray by Each Nare route daily 1 Spray in each nostril 3/11/19   NOHELIA Mcintyre CNP   pantoprazole (PROTONIX) 20 MG tablet take 1 tablet by mouth once daily 3/8/19   [facility], APRN - CNP   albuterol sulfate HFA (PROVENTIL HFA) 108 (90 Base) MCG/ACT inhaler Inhale 2 puffs into the lungs every 6 hours as needed for Wheezing 3/6/19   [facility], APRN - CNP   lisinopril (PRINIVIL;ZESTRIL) 2.5 MG tablet Take 1 tablet by mouth daily 2/4/19   [facility] C[facility]   simvastatin (ZOCOR) 10 MG tablet Take 1 tablet by mouth nightly 10/8/18   [facility], APRN - CNP   albuterol (PROVENTIL) (2.5 MG/3ML) 0.083% nebulizer solution Take 2.5 mg by nebulization every 4 hours as needed for Wheezing    Historical Provider, MD   glucose blood VI test strips (EXACTECH TEST) strip 1 each by In Vitro route 3 times daily As needed. 3/26/18   NOHELIA May CNP   NONFORMULARY Indications: Milk of magnesia. Historical Provider, MD   senna (SENOKOT) 8.6 MG tablet Take 1 tablet by mouth daily Patient takes 1 tablet daily and additional pill PRN. Historical Provider, MD   NONFORMULARY Take 1 tablet by mouth as needed (Leg cramp relief) Indications: Leg Cramps José's leg cramp relief 50 quick-dissolving tablets, homeopathic.     Historical Provider, MD       Future Appointments   Date Time Provider Mai Gutierrez   5/13/2019  2:30 PM Ella [facility]

## 2019-04-29 ENCOUNTER — HOSPITAL ENCOUNTER (OUTPATIENT)
Age: 84
Discharge: HOME | End: 2019-04-29
Payer: MEDICARE

## 2019-04-29 DIAGNOSIS — I10 ESSENTIAL HYPERTENSION, BENIGN: ICD-10-CM

## 2019-04-29 DIAGNOSIS — E78.00 PURE HYPERCHOLESTEROLEMIA: ICD-10-CM

## 2019-04-29 DIAGNOSIS — E11.22 CKD STAGE 3 DUE TO TYPE 2 DIABETES MELLITUS (HCC): ICD-10-CM

## 2019-04-29 DIAGNOSIS — E03.9 ACQUIRED HYPOTHYROIDISM: ICD-10-CM

## 2019-04-29 DIAGNOSIS — N18.30 CKD STAGE 3 DUE TO TYPE 2 DIABETES MELLITUS (HCC): ICD-10-CM

## 2019-04-29 DIAGNOSIS — Z12.5 PROSTATE CANCER SCREENING: ICD-10-CM

## 2019-04-29 DIAGNOSIS — E55.9 VITAMIN D DEFICIENCY: ICD-10-CM

## 2019-04-29 LAB
ABSOLUTE EOS #: 0.1 K/UL (ref 0–0.4)
ABSOLUTE IMMATURE GRANULOCYTE: ABNORMAL K/UL (ref 0–0.3)
ABSOLUTE LYMPH #: 1.5 K/UL (ref 1–4.8)
ABSOLUTE MONO #: 0.4 K/UL (ref 0–1)
ALBUMIN SERPL-MCNC: 4.1 G/DL (ref 3.5–5.2)
ALBUMIN/GLOBULIN RATIO: NORMAL (ref 1–2.5)
ALP BLD-CCNC: 64 U/L (ref 40–129)
ALT SERPL-CCNC: 14 U/L (ref 5–41)
ANION GAP SERPL CALCULATED.3IONS-SCNC: 11 MMOL/L (ref 9–17)
AST SERPL-CCNC: 22 U/L
BASOPHILS # BLD: 0 % (ref 0–2)
BASOPHILS ABSOLUTE: 0 K/UL (ref 0–0.2)
BILIRUB SERPL-MCNC: 0.37 MG/DL (ref 0.3–1.2)
BILIRUBIN DIRECT: <0.08 MG/DL
BILIRUBIN, INDIRECT: NORMAL MG/DL (ref 0–1)
BUN BLDV-MCNC: 22 MG/DL (ref 8–23)
BUN/CREAT BLD: 13 (ref 9–20)
CALCIUM SERPL-MCNC: 9.1 MG/DL (ref 8.6–10.4)
CHLORIDE BLD-SCNC: 100 MMOL/L (ref 98–107)
CO2: 27 MMOL/L (ref 20–31)
CREAT SERPL-MCNC: 1.72 MG/DL (ref 0.7–1.2)
DIFFERENTIAL TYPE: YES
EOSINOPHILS RELATIVE PERCENT: 2 % (ref 0–5)
GFR AFRICAN AMERICAN: 46 ML/MIN
GFR NON-AFRICAN AMERICAN: 38 ML/MIN
GFR SERPL CREATININE-BSD FRML MDRD: ABNORMAL ML/MIN/{1.73_M2}
GFR SERPL CREATININE-BSD FRML MDRD: ABNORMAL ML/MIN/{1.73_M2}
GLOBULIN: NORMAL G/DL (ref 1.5–3.8)
GLUCOSE BLD-MCNC: 198 MG/DL (ref 70–99)
HCT VFR BLD CALC: 28.6 % (ref 41–53)
HEMOGLOBIN: 9.7 G/DL (ref 13.5–17.5)
IMMATURE GRANULOCYTES: ABNORMAL %
LYMPHOCYTES # BLD: 31 % (ref 13–44)
MCH RBC QN AUTO: 30.2 PG (ref 26–34)
MCHC RBC AUTO-ENTMCNC: 33.9 G/DL (ref 31–37)
MCV RBC AUTO: 89 FL (ref 80–100)
MONOCYTES # BLD: 8 % (ref 5–9)
NRBC AUTOMATED: ABNORMAL PER 100 WBC
PDW BLD-RTO: 14.8 % (ref 12.1–15.2)
PLATELET # BLD: 121 K/UL (ref 140–450)
PLATELET ESTIMATE: ABNORMAL
PMV BLD AUTO: ABNORMAL FL (ref 6–12)
POTASSIUM SERPL-SCNC: 4 MMOL/L (ref 3.7–5.3)
PROSTATE SPECIFIC ANTIGEN: 0.22 UG/L
RBC # BLD: 3.22 M/UL (ref 4.5–5.9)
RBC # BLD: ABNORMAL 10*6/UL
SEG NEUTROPHILS: 59 % (ref 39–75)
SEGMENTED NEUTROPHILS ABSOLUTE COUNT: 2.8 K/UL (ref 2.1–6.5)
SODIUM BLD-SCNC: 138 MMOL/L (ref 135–144)
TOTAL PROTEIN: 6.6 G/DL (ref 6.4–8.3)
TSH SERPL DL<=0.05 MIU/L-ACNC: 2.44 MIU/L (ref 0.3–5)
VITAMIN D 25-HYDROXY: 22.8 NG/ML (ref 30–100)
WBC # BLD: 4.7 K/UL (ref 3.5–11)
WBC # BLD: ABNORMAL 10*3/UL

## 2019-04-29 PROCEDURE — 82306 VITAMIN D 25 HYDROXY: CPT

## 2019-04-29 PROCEDURE — 84443 ASSAY THYROID STIM HORMONE: CPT

## 2019-04-29 PROCEDURE — 36415 COLL VENOUS BLD VENIPUNCTURE: CPT

## 2019-04-29 PROCEDURE — 85025 COMPLETE CBC W/AUTO DIFF WBC: CPT

## 2019-04-29 PROCEDURE — 80048 BASIC METABOLIC PNL TOTAL CA: CPT

## 2019-04-29 PROCEDURE — G0103 PSA SCREENING: HCPCS

## 2019-04-29 PROCEDURE — 80076 HEPATIC FUNCTION PANEL: CPT

## 2019-04-30 ENCOUNTER — HOSPITAL ENCOUNTER (OUTPATIENT)
Age: 84
Discharge: HOME | End: 2019-04-30
Payer: MEDICARE

## 2019-04-30 ENCOUNTER — OFFICE VISIT (OUTPATIENT)
Dept: FAMILY MEDICINE CLINIC | Age: 84
End: 2019-04-30
Payer: MEDICARE

## 2019-04-30 ENCOUNTER — TELEPHONE (OUTPATIENT)
Dept: FAMILY MEDICINE CLINIC | Age: 84
End: 2019-04-30

## 2019-04-30 VITALS
BODY MASS INDEX: 27.32 KG/M2 | SYSTOLIC BLOOD PRESSURE: 120 MMHG | OXYGEN SATURATION: 98 % | HEART RATE: 66 BPM | DIASTOLIC BLOOD PRESSURE: 68 MMHG | HEIGHT: 65 IN | WEIGHT: 164 LBS

## 2019-04-30 DIAGNOSIS — M54.50 CHRONIC LEFT-SIDED LOW BACK PAIN WITHOUT SCIATICA: ICD-10-CM

## 2019-04-30 DIAGNOSIS — R53.83 EASY FATIGABILITY: ICD-10-CM

## 2019-04-30 DIAGNOSIS — D64.9 NORMOCYTIC ANEMIA: ICD-10-CM

## 2019-04-30 DIAGNOSIS — R53.1 GENERAL WEAKNESS: Primary | ICD-10-CM

## 2019-04-30 DIAGNOSIS — R06.09 DYSPNEA ON EXERTION: ICD-10-CM

## 2019-04-30 DIAGNOSIS — G89.29 CHRONIC LEFT-SIDED LOW BACK PAIN WITHOUT SCIATICA: ICD-10-CM

## 2019-04-30 DIAGNOSIS — R06.09 OTHER FORMS OF DYSPNEA: ICD-10-CM

## 2019-04-30 DIAGNOSIS — R68.89 OTHER GENERAL SYMPTOMS AND SIGNS: ICD-10-CM

## 2019-04-30 DIAGNOSIS — R53.1 GENERAL WEAKNESS: ICD-10-CM

## 2019-04-30 DIAGNOSIS — I10 ESSENTIAL HYPERTENSION, BENIGN: ICD-10-CM

## 2019-04-30 LAB
BILIRUBIN URINE: NEGATIVE
COLOR: YELLOW
COMMENT UA: ABNORMAL
GLUCOSE URINE: NEGATIVE
IRON: 64 UG/DL (ref 59–158)
KETONES, URINE: NEGATIVE
LEUKOCYTE ESTERASE, URINE: NEGATIVE
NITRITE, URINE: NEGATIVE
PH UA: 7 (ref 5–8)
PROSTATE SPECIFIC ANTIGEN: 0.22 UG/L
PROTEIN UA: ABNORMAL
SPECIFIC GRAVITY UA: 1.01 (ref 1–1.03)
TURBIDITY: CLEAR
URINE HGB: NEGATIVE
UROBILINOGEN, URINE: NORMAL
VITAMIN B-12: 749 PG/ML (ref 232–1245)

## 2019-04-30 PROCEDURE — 93005 ELECTROCARDIOGRAM TRACING: CPT

## 2019-04-30 PROCEDURE — G8427 DOCREV CUR MEDS BY ELIG CLIN: HCPCS | Performed by: FAMILY MEDICINE

## 2019-04-30 PROCEDURE — 1036F TOBACCO NON-USER: CPT | Performed by: FAMILY MEDICINE

## 2019-04-30 PROCEDURE — G8417 CALC BMI ABV UP PARAM F/U: HCPCS | Performed by: FAMILY MEDICINE

## 2019-04-30 PROCEDURE — 4040F PNEUMOC VAC/ADMIN/RCVD: CPT | Performed by: FAMILY MEDICINE

## 2019-04-30 PROCEDURE — 99214 OFFICE O/P EST MOD 30 MIN: CPT | Performed by: FAMILY MEDICINE

## 2019-04-30 PROCEDURE — 36415 COLL VENOUS BLD VENIPUNCTURE: CPT

## 2019-04-30 PROCEDURE — 82607 VITAMIN B-12: CPT

## 2019-04-30 PROCEDURE — 84153 ASSAY OF PSA TOTAL: CPT

## 2019-04-30 PROCEDURE — 81003 URINALYSIS AUTO W/O SCOPE: CPT

## 2019-04-30 PROCEDURE — 1123F ACP DISCUSS/DSCN MKR DOCD: CPT | Performed by: FAMILY MEDICINE

## 2019-04-30 PROCEDURE — 83540 ASSAY OF IRON: CPT

## 2019-04-30 ASSESSMENT — ENCOUNTER SYMPTOMS
RESPIRATORY NEGATIVE: 1
BLOOD IN STOOL: 0
CONSTIPATION: 0
DIARRHEA: 0

## 2019-04-30 NOTE — PROGRESS NOTES
Name: Francisco Driscoll  : 1934         Chief Complaint:     Chief Complaint   Patient presents with    Extremity Weakness    Fatigue    Anemia       History of Present Illness:      Francisco Driscoll is a 80 y.o.  male who presents with Extremity Weakness; Fatigue; and Anemia      HPI     C/o feeling very weak and tired recently, stands up and can fall back down. Says he had a horrible winter, lots of breathing trouble. States he's been told for years that he had bad COPD, but he saw Arnol Jacques and was told his lungs were clear and that he had sinusitis, given flonase and 6 wks later felt great, aside from this weakness. Patient gives various answers about how easily he gets fatigued or worn out with activity. Difficulty describing his symptoms. Initially he says that he doesn't get tired or weak with activity, just feels generally tired all over. But then he also says coming down fowler he had to hold onto wall d/t weakness. 2 days ago got up at 5:30, sat on couch and then felt like something was really wrong, could pass out. Laid on couch. Checked bp and it was 90/44. Never feels room spinning. Aletha Love few mos ago,\" thinks he lost his balance but says he wasn't hurt - says the flank pain is what has come and gone for several yrs. Went away when he took the steroid. Now hurting more again when he's trying to sleep, has to get out of bed and sit up for a while and then it goes away.     Past Medical History:     Past Medical History:   Diagnosis Date    Back pain     Blind right eye     left eye poor vision also    Diabetes mellitus (HCC)     Diverticulosis     Glaucoma     Hernia, ventral     Hypothyroidism (acquired)         Past Surgical History:     Past Surgical History:   Procedure Laterality Date   Aurora Rodrigues      Dr. Yesi Henao    ERCP  2017    balloon sweep,    ERCP N/A 2017    ERCP ENDOSCOPIC RETROGRADE CHOLANGIOPANCREATOGRAPHY WITH BALLOON SWEEP AND CHOLANGIOGRAM, GI UNIT, C-ARM  performed by Salvatore Manzo MD at [address]      blood in the eyeball    OTHER SURGICAL HISTORY  05-20-16    Laparoscopic selena. converted to open selena.  TONSILLECTOMY          Medications:       Prior to Admission medications    Medication Sig Start Date End Date Taking? Authorizing Provider   rOPINIRole (REQUIP) 0.5 MG tablet take 1 tablet by mouth every evening if needed for RESTLESS LEG SYNDROME 4/22/19   NOHELIA Buckley CNP   traZODone (DESYREL) 50 MG tablet take 1 tablet by mouth every evening 4/5/19   NOHELIA Buckley CNP   montelukast (SINGULAIR) 10 MG tablet take 1 tablet by mouth once daily 3/11/19   NOHELIA Castillo CNP   fluticasone (FLONASE) 50 MCG/ACT nasal spray 1 spray by Each Nare route daily 1 Spray in each nostril 3/11/19   NOHELIA Castillo CNP   pantoprazole (PROTONIX) 20 MG tablet take 1 tablet by mouth once daily 3/8/19   NOHELIA Buckley CNP   albuterol sulfate HFA (PROVENTIL HFA) 108 (90 Base) MCG/ACT inhaler Inhale 2 puffs into the lungs every 6 hours as needed for Wheezing 3/6/19   NOHELIA Buckley CNP   lisinopril (PRINIVIL;ZESTRIL) 2.5 MG tablet Take 1 tablet by mouth daily 2/4/19   NOHELIA Buckley CNP   simvastatin (ZOCOR) 10 MG tablet Take 1 tablet by mouth nightly 10/8/18   NOHELIA Buckley CNP   albuterol (PROVENTIL) (2.5 MG/3ML) 0.083% nebulizer solution Take 2.5 mg by nebulization every 4 hours as needed for Wheezing    Historical Provider, MD   glucose blood VI test strips (EXACTECH TEST) strip 1 each by In Vitro route 3 times daily As needed. 3/26/18   NOHELIA Buckley CNP   NONFORMULARY Indications: Milk of magnesia. Historical Provider, MD   senna (SENOKOT) 8.6 MG tablet Take 1 tablet by mouth daily Patient takes 1 tablet daily and additional pill PRN.     Historical Provider, MD   NONFORMULARY Take 1 tablet by mouth as needed (Leg cramp relief) Indications: Leg Cramps José's leg cramp relief 50 quick-dissolving tablets, homeopathic. Historical Provider, MD        Allergies:       Aspirin; Codeine; and Penicillins    Social History:     Tobacco:    reports that he has never smoked. He has never used smokeless tobacco.  Alcohol:      reports that he does not drink alcohol. Drug Use:  reports that he does not use drugs. Family History:     Family History   Problem Relation Age of Onset   Story Cancer Mother         colon    Cancer Sister         colon    Cancer Other         colon    Cancer Sister         colon       Review of Systems:     Positive and Negative as described in HPI    Review of Systems   Constitutional: Negative. Respiratory: Negative. Cardiovascular: Negative. Gastrointestinal: Negative for blood in stool, constipation and diarrhea. Physical Exam:     Vitals:  /68   Pulse 66   Ht 5' 5\" (1.651 m)   Wt 164 lb (74.4 kg)   SpO2 98%   BMI 27.29 kg/m²   Physical Exam   Constitutional: He appears well-developed and well-nourished. He is cooperative. HENT:   Right Ear: Hearing and tympanic membrane normal.   Left Ear: Hearing and tympanic membrane normal.   Nose: Nose normal.   Mouth/Throat: Oropharynx is clear and moist and mucous membranes are normal. Normal dentition. Eyes: Pupils are equal, round, and reactive to light. Conjunctivae and EOM are normal.   Neck: No thyroid mass and no thyromegaly present. Cardiovascular: Normal rate, regular rhythm, S1 normal and S2 normal.   No murmur heard. No peripheral edema. Pulmonary/Chest: Effort normal and breath sounds normal.   Abdominal: Soft. Bowel sounds are normal. There is no hepatosplenomegaly. There is no tenderness. Musculoskeletal:   No bruising on the back. No tenderness of the lumbar or thoracic spine or lumbar paraspinals   Lymphadenopathy:     He has no cervical adenopathy.    Neurological: He is alert. He has normal strength. Skin: Skin is warm and dry. No rash noted. Psychiatric: He has a normal mood and affect. His behavior is normal.   Pleasant but forgetful, has difficulty recalling timeline of events   Nursing note and vitals reviewed. Data:     Lab Results   Component Value Date     04/29/2019    K 4.0 04/29/2019     04/29/2019    CO2 27 04/29/2019    BUN 22 04/29/2019    CREATININE 1.72 04/29/2019    GLUCOSE 198 04/29/2019    GLUCOSE 146 01/10/2012    PROT 6.6 04/29/2019    LABALBU 4.1 04/29/2019    LABALBU 4.4 01/10/2012    BILITOT 0.37 04/29/2019    ALKPHOS 64 04/29/2019    AST 22 04/29/2019    ALT 14 04/29/2019     Lab Results   Component Value Date    WBC 4.7 04/29/2019    RBC 3.22 04/29/2019    RBC 4.07 01/10/2012    HGB 9.7 04/29/2019    HCT 28.6 04/29/2019    MCV 89.0 04/29/2019    MCH 30.2 04/29/2019    MCHC 33.9 04/29/2019    RDW 14.8 04/29/2019     04/29/2019     01/10/2012    MPV NOT REPORTED 04/29/2019     Lab Results   Component Value Date    TSH 2.44 04/29/2019     Lab Results   Component Value Date    CHOL 114 04/03/2018    HDL 60 04/03/2018    PSA 0.22 04/30/2019    LABA1C 8.1 02/04/2019         Assessment & Plan:        Diagnosis Orders   1. General weakness  PSA, Diagnostic    EKG 12 lead    Echocardiogram complete    (Gxt) Stress Test Exercise W Out Myoview   2. Easy fatigability  Iron    Vitamin B12    EKG 12 lead    Echocardiogram complete    (Gxt) Stress Test Exercise W Out Myoview   3. Normocytic anemia  Iron    Vitamin B12    PSA, Diagnostic    Urinalysis Reflex to Culture   4. Chronic left-sided low back pain without sciatica  PSA, Diagnostic   5. Essential hypertension, benign  Echocardiogram complete    (Gxt) Stress Test Exercise W Out Myoview   6. Other general symptoms and signs   Echocardiogram complete   7. Other forms of dyspnea   (Gxt) Stress Test Exercise W Out Myoview   General weakness, and also may have easy fatigability. Difficult to say how long these have been going on, due to some limitation of patient's history. Broad differential.  Reviewed labs from yesterday which did show worsening anemia than he had previously had. This is normocytic. Checking iron, B12 as above. Consider GI workup. Also ordered stress test and echo. Follow closely. Continue current medications. Patient was well-appearing on exam and had normal vital signs. Requested Prescriptions      No prescriptions requested or ordered in this encounter       Patient Instructions     SURVEY:    You may be receiving a survey from [employer] regarding your visit today. Please complete the survey to enable us to provide the highest quality of care to you and your family. If you cannot score us as very good on any question, please call the office to discuss how we could have made your experience exceptional.     Thank you. Clif received counseling on the following healthy behaviors: medication adherence  Reviewed prior labs and health maintenance. Continue current medications, diet and exercise. Discussed use, benefit, and side effects of prescribed medications. Barriers to medication compliance addressed. Patient given educational materials - see patient instructions. All patient questions answered. Patient voiced understanding.      Electronically signed by Ember Carbajal DO on 5/1/2019 at 3:46 PM   [address] [address]  Dept: [phone]

## 2019-04-30 NOTE — PATIENT INSTRUCTIONS
SURVEY:    You may be receiving a survey from [employer] regarding your visit today. Please complete the survey to enable us to provide the highest quality of care to you and your family. If you cannot score us as very good on any question, please call the office to discuss how we could have made your experience exceptional.     Thank you.

## 2019-04-30 NOTE — TELEPHONE ENCOUNTER
----- Message from Yosef Davalos, DO sent at 4/30/2019  3:14 PM EDT -----  Labs and EKG actually all look okay, so I'm not sure why he is feeling so weak. I do recommend that we do some heart testing, echocardiogram and an EKG treadmill stress test.  I will order. Iron is on the low end of normal but not deficient. Still not sure why he is anemic.

## 2019-05-01 DIAGNOSIS — E78.00 HYPERCHOLESTEROLEMIA: ICD-10-CM

## 2019-05-01 LAB
EKG ATRIAL RATE: 61 BPM
EKG P AXIS: 71 DEGREES
EKG P-R INTERVAL: 194 MS
EKG Q-T INTERVAL: 410 MS
EKG QRS DURATION: 94 MS
EKG QTC CALCULATION (BAZETT): 412 MS
EKG R AXIS: -48 DEGREES
EKG T AXIS: 84 DEGREES
EKG VENTRICULAR RATE: 61 BPM

## 2019-05-01 RX ORDER — SIMVASTATIN 10 MG
TABLET ORAL
Qty: 90 TABLET | Refills: 1 | Status: SHIPPED | OUTPATIENT
Start: 2019-05-01 | End: 2019-10-25 | Stop reason: SDUPTHER

## 2019-05-13 ENCOUNTER — OFFICE VISIT (OUTPATIENT)
Dept: FAMILY MEDICINE CLINIC | Age: 84
End: 2019-05-13
Payer: MEDICARE

## 2019-05-13 VITALS
OXYGEN SATURATION: 95 % | HEART RATE: 63 BPM | DIASTOLIC BLOOD PRESSURE: 58 MMHG | TEMPERATURE: 98.2 F | WEIGHT: 162.8 LBS | SYSTOLIC BLOOD PRESSURE: 112 MMHG | BODY MASS INDEX: 27.09 KG/M2

## 2019-05-13 DIAGNOSIS — R53.1 WEAKNESS GENERALIZED: ICD-10-CM

## 2019-05-13 DIAGNOSIS — D63.8 ANEMIA OF CHRONIC DISEASE: Primary | ICD-10-CM

## 2019-05-13 DIAGNOSIS — M46.1 SACROILIITIS (HCC): ICD-10-CM

## 2019-05-13 DIAGNOSIS — N18.30 CKD STAGE 3 DUE TO TYPE 2 DIABETES MELLITUS (HCC): ICD-10-CM

## 2019-05-13 DIAGNOSIS — E11.22 CKD STAGE 3 DUE TO TYPE 2 DIABETES MELLITUS (HCC): ICD-10-CM

## 2019-05-13 PROCEDURE — 1036F TOBACCO NON-USER: CPT | Performed by: NURSE PRACTITIONER

## 2019-05-13 PROCEDURE — G8417 CALC BMI ABV UP PARAM F/U: HCPCS | Performed by: NURSE PRACTITIONER

## 2019-05-13 PROCEDURE — 4040F PNEUMOC VAC/ADMIN/RCVD: CPT | Performed by: NURSE PRACTITIONER

## 2019-05-13 PROCEDURE — 1123F ACP DISCUSS/DSCN MKR DOCD: CPT | Performed by: NURSE PRACTITIONER

## 2019-05-13 PROCEDURE — G8427 DOCREV CUR MEDS BY ELIG CLIN: HCPCS | Performed by: NURSE PRACTITIONER

## 2019-05-13 PROCEDURE — 99214 OFFICE O/P EST MOD 30 MIN: CPT | Performed by: NURSE PRACTITIONER

## 2019-05-13 ASSESSMENT — ENCOUNTER SYMPTOMS: BACK PAIN: 1

## 2019-05-13 NOTE — PATIENT INSTRUCTIONS
SURVEY:    You may be receiving a survey from [employer] regarding your visit today. Please complete the survey to enable us to provide the highest quality of care to you and your family. If you cannot score us a very good on any question, please call the office to discuss how we could have made your experience a very good one. Thank you.

## 2019-05-13 NOTE — PROGRESS NOTES
for cough. Cardiovascular: Negative for chest pain and leg swelling. Gastrointestinal: Negative for abdominal distention. Genitourinary: Negative for difficulty urinating. Musculoskeletal: Positive for back pain. Neurological: Positive for weakness. Negative for dizziness and headaches. Psychiatric/Behavioral: Negative for agitation. Prior to Visit Medications    Medication Sig Taking? Authorizing Provider   simvastatin (ZOCOR) 10 MG tablet take 1 tablet by mouth every evening Yes NOHELIA Baker CNP   rOPINIRole (REQUIP) 0.5 MG tablet take 1 tablet by mouth every evening if needed for RESTLESS LEG SYNDROME Yes NOHELIA Baker CNP   traZODone (DESYREL) 50 MG tablet take 1 tablet by mouth every evening Yes NOHELIA Baker CNP   fluticasone (FLONASE) 50 MCG/ACT nasal spray 1 spray by Each Nare route daily 1 Spray in each nostril Yes NOHELIA Jimenez CNP   albuterol sulfate HFA (PROVENTIL HFA) 108 (90 Base) MCG/ACT inhaler Inhale 2 puffs into the lungs every 6 hours as needed for Wheezing Yes NOHELIA Baker CNP   glucose blood VI test strips (EXACTECH TEST) strip 1 each by In Vitro route 3 times daily As needed. Yes NOHELIA Baker CNP   NONFORMULARY Take 1 tablet by mouth as needed (Leg cramp relief) Indications: Leg Cramps José's leg cramp relief 50 quick-dissolving tablets, homeopathic. Yes Historical Provider, MD        Social History     Tobacco Use    Smoking status: Never Smoker    Smokeless tobacco: Never Used   Substance Use Topics    Alcohol use: No        Vitals:    05/13/19 1422   BP: (!) 112/58   Site: Left Upper Arm   Position: Sitting   Cuff Size: Medium Adult   Pulse: 63   Temp: 98.2 °F (36.8 °C)   TempSrc: Oral   SpO2: 95%   Weight: 162 lb 12.8 oz (73.8 kg)     Estimated body mass index is 27.09 kg/m² as calculated from the following:    Height as of 4/30/19: 5' 5\" (1.651 m).     Weight as of this encounter: 162 lb 12.8 oz (73.8 authenticate this note.     --Ella Morse, NOHELIA - CNP on 5/16/2019 at 9:15 PM

## 2019-05-14 ENCOUNTER — CARE COORDINATION (OUTPATIENT)
Dept: CARE COORDINATION | Age: 84
End: 2019-05-14

## 2019-05-14 NOTE — CARE COORDINATION
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 Review blood sugars, diet, changes in symptoms COPD, DM. Assess for new needs. Summary: Pt states his back pain is much better, he is able to sleep at night now, he hopes it stays this way, his blood sugars are a little elevated due to steroid but otherwise ok, he was using Flonase for sinuses which worked great but made his nose bleed so he stopped using. Patient history; reason for CC:       Episode Started: 8/1/2017  COPD, DM, CKD    · Last PCP appt / notes reviewed: 4/15/19 - f/u 5/13/19  Acute left-sided low back pain without sciatica     Gentle stretching  Will give steroid taper take with food.      - XR LUMBAR SPINE (MIN 4 VIEWS); Future     2. Sacroiliitis ([address]  Consider xray and physical therapy   No radiculopathy or neurological management. - XR LUMBAR SPINE (MIN 4 VIEWS); Future    Specialist appt / notes reviewed:  Neurology; Kasia Casey 12/14/18, return in 2 weeks, no notes noted  EMG; NCS bilateral for assessment of neuropathy; Carotid US; Fall Precautions; Use Cane; Labs, CTA head and neck. General surgeon 11/13/18 - Pt was seen for ventral hernia  Was told to see Cardiology for clearance, pt did not make appt told [facility] office \"will be back in Jan\", no appt noted. Diabetes Education: Attempted to schedule patient 3/26, 4/19, pt was not ready, no follow up since. ED visits or Hospitalizations? Patient has had 0 ED visits, and 0 hospitalizations since last CC contact.    Last ED visit 9/10/2018, last hospitalization none in the last 12 months    Medications Reviewed with today: Yes  Patient verbalizes that he has all of medications; and denies needing any assistance currently. Possible Social Deterrents Reviewed:   · Adequate food/ Transportation? Yes; Federico Madison, family for transportation    · Is patient independent with ADL's/IADL'S? 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 (Nutrition/Exercise/Self-Monitoring):  No   Have you ever had to go to the ED for symptoms of low blood sugar?:  No          ,   COPD Assessment    Does the patient understand envrionmental exposure?:  Yes  Is the patient able to verbalize Rescue vs. Long Acting medications?:  Yes  Does the patient have a nebulizer?:  No  Does the patient use a space with inhaled medications?:  No            Symptoms:          and   General Assessment         Diabetes Assessment    Medic Alert ID:  No  Meal Planning:  Avoidance of concentrated sweets   How often do you test your blood sugar?:  Daily   Do you have barriers with adherence to non-pharmacologic self-management interventions?  (Nutrition/Exercise/Self-Monitoring):  No   Have you ever had to go to the ED for symptoms of low blood sugar?:  No              Health Maintenance Due   Topic Date Due    DTaP/Tdap/Td vaccine (1 - Tdap) 05/10/1953    Shingles Vaccine (1 of 2) 05/10/1984    Pneumococcal 65+ years Vaccine (1 of 2 - PCV13) 05/10/1999    Diabetic retinal exam  05/10/2009    Lipid screen  04/03/2019         Care Coordination Interventions    Program Enrollment:  Rising Risk  Referral from Primary Care Provider:  No  Suggested Interventions and Community Resources  Assocation for Blind:  Not Started  Diabetes Education:  Declined (Comment: Pt referred to [facility] ED, has not agreed to appt)  Fall Risk Prevention:

## 2019-05-16 ASSESSMENT — ENCOUNTER SYMPTOMS
ABDOMINAL DISTENTION: 0
COUGH: 0
SORE THROAT: 0
SINUS PRESSURE: 0

## 2019-05-21 ENCOUNTER — CARE COORDINATION (OUTPATIENT)
Dept: CARE COORDINATION | Age: 84
End: 2019-05-21

## 2019-05-21 DIAGNOSIS — M54.50 CHRONIC MIDLINE LOW BACK PAIN WITHOUT SCIATICA: Primary | ICD-10-CM

## 2019-05-21 DIAGNOSIS — G89.29 CHRONIC MIDLINE LOW BACK PAIN WITHOUT SCIATICA: Primary | ICD-10-CM

## 2019-05-21 RX ORDER — MELOXICAM 7.5 MG/1
7.5 TABLET ORAL DAILY PRN
Qty: 30 TABLET | Refills: 0 | Status: SHIPPED | OUTPATIENT
Start: 2019-05-21 | End: 2019-06-24 | Stop reason: SDUPTHER

## 2019-05-21 NOTE — TELEPHONE ENCOUNTER
mobic 7.5 mg daily ordered. He can also try tylenol 500mg every 8 hours as needed which I know he has in the past.       Please also realize this pt has urinalysis and lumbar xray that were ordered which have not been done to evaluate his back pain further. Ask him to complete these please.

## 2019-05-21 NOTE — TELEPHONE ENCOUNTER
Physical therapy was advised who come to his house or he can go there if he chooses but he REFUSED it. Would benefit to stretch out muscles. Cannot keep on steroid due to immunocompromises and puts at higher risk for infections (pneumonia)    Let me know. mobic possible option too.  (which also carries vascular risks due to his age)

## 2019-05-21 NOTE — CARE COORDINATION
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 How is back    Reason For Call: Assess for Graduation    Summary: Pt states his back pain is back after stopping the prednisone, message sent to Claudio Carballo. DM- No highs or lows  No additional Care coordination needs at this time. Patient history; reason for CC:       Episode Started: 8/1/2017  COPD, DM, CKD    · Last PCP appt / notes reviewed: 4/15/19 - f/u 5/13/19  Acute left-sided low back pain without sciatica     Gentle stretching  Will give steroid taper take with food.      - XR LUMBAR SPINE (MIN 4 VIEWS); Future     2. Sacroiliitis ([address]  Consider xray and physical therapy   No radiculopathy or neurological management. - XR LUMBAR SPINE (MIN 4 VIEWS); Future    Specialist appt / notes reviewed:  Neurology; Murali Jama 12/14/18, return in 2 weeks, no notes noted  EMG; NCS bilateral for assessment of neuropathy; Carotid US; Fall Precautions; Use Cane; Labs, CTA head and neck. General surgeon 11/13/18 - Pt was seen for ventral hernia  Was told to see Cardiology for clearance, [facility] no appt noted. Diabetes Education: Attempted to schedule patient 3/26, 4/19, pt was not ready, no follow up since. ED visits or Hospitalizations? Patient has had 0 ED visits, and 0 hospitalizations since last CC contact. Last ED visit 9/10/2018, last hospitalization none in the last 12 months    Medications Reviewed with today: Yes  Patient verbalizes that he has all of medications; and denies needing any assistance currently. Possible Social Deterrents Reviewed:   · Adequate food/ Transportation?  Yes; Federico Madison, family for transportation    · Is patient independent with ADL's/IADL'S? 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 (Nutrition/Exercise/Self-Monitoring):  No   Have you ever had to go to the ED for symptoms of low blood sugar?:  No       No patient-reported symptoms      ,   COPD Assessment    Does the patient understand envrionmental exposure?:  Yes  Is the patient able to verbalize Rescue vs. Long Acting medications?:  Yes  Does the patient have a nebulizer?:  No  Does the patient use a space with inhaled medications?:  No     No patient-reported symptoms         Symptoms:          and   General Assessment    Do you have any symptoms that are causing concern?:  No       Diabetes Assessment    Medic Alert ID:  No  Meal Planning:  Avoidance of concentrated sweets   How often do you test your blood sugar?:  Daily   Do you have barriers with adherence to non-pharmacologic self-management interventions?  (Nutrition/Exercise/Self-Monitoring):  No   Have you ever had to go to the ED for symptoms of low blood sugar?:  No       No patient-reported symptoms          Health Maintenance Due   Topic Date Due    DTaP/Tdap/Td vaccine (1 - Tdap) 05/10/1953    Shingles Vaccine (1 of 2) 05/10/1984    Pneumococcal 65+ years Vaccine (1 of 2 - PCV13) 05/10/1999    Diabetic retinal exam  05/10/2009    Lipid screen  04/03/2019         Care Coordination Interventions    Program Enrollment:  Rising Risk  Referral from Primary Care Provider:  No  Suggested Interventions and Community Resources  Assocation for Blind:  Not Started  Diabetes Education:  Declined (Comment: Pt referred to [facility] ED, has not agreed to appt)  Fall Risk Prevention:  Completed  Senior Services:  Not As needed. 3/26/18   NOHELIA Panchal CNP   NONFORMULARY Take 1 tablet by mouth as needed (Leg cramp relief) Indications: Leg Cramps José's leg cramp relief 50 quick-dissolving tablets, homeopathic.     Historical Provider, MD       Future Appointments   Date Time Provider Mai Gutierrez   8/12/2019  2:00 PM NOHELIA Panchal CNP [facility]   8/12/2019  2:00 PM [facility]

## 2019-05-21 NOTE — CARE COORDINATION
Call patient back and review Janene's recommendations. He still is not interested in PT, would like to try Mobic. We are also going to look into adjustable base beds, he would like Medicare to cover it, I will have to research to see if this is an option. He states he sleeps well in his recliner no back pain when he does that and no back pain during the day only when he is sleeping at night. We discussed his mattress, states it's real old but looks just the same as it did and does not feel that could be a problem.     Nikki Mace RN

## 2019-05-22 ENCOUNTER — CARE COORDINATION (OUTPATIENT)
Dept: CARE COORDINATION | Age: 84
End: 2019-05-22

## 2019-05-22 NOTE — CARE COORDINATION
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 How is back    Reason For Call: Assess for Graduation    Summary:Pt is going today this afternoon to get medications, going to get labs and xray Friday, will f/u next week with him. Patient history; reason for CC:       Episode Started: 8/1/2017  COPD, DM, CKD    · Last PCP appt / notes reviewed: 4/15/19 - f/u 5/13/19  Acute left-sided low back pain without sciatica     Gentle stretching  Will give steroid taper take with food.      - XR LUMBAR SPINE (MIN 4 VIEWS); Future     2. Sacroiliitis ([address]  Consider xray and physical therapy   No radiculopathy or neurological management. - XR LUMBAR SPINE (MIN 4 VIEWS); Future    Specialist appt / notes reviewed:  Neurology; Harlo Appl 12/14/18, return in 2 weeks, no notes noted  EMG; NCS bilateral for assessment of neuropathy; Carotid US; Fall Precautions; Use Cane; Labs, CTA head and neck. General surgeon 11/13/18 - Pt was seen for ventral hernia  Was told to see Cardiology for clearance, pt did not make appt told [facility] office \"will be back in Jan\", no appt noted. Diabetes Education: Attempted to schedule patient 3/26, 4/19, pt was not ready, no follow up since. ED visits or Hospitalizations? Patient has had 0 ED visits, and 0 hospitalizations since last CC contact. Last ED visit 9/10/2018, last hospitalization none in the last 12 months    Medications Reviewed with today: Yes  Patient verbalizes that he has all of medications; and denies needing any assistance currently. Possible Social Deterrents Reviewed:   · Adequate food/ Transportation? Yes; family Pavithra for transportation    · Is patient independent with ADL's/IADL'S?  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 (Nutrition/Exercise/Self-Monitoring):  No   Have you ever had to go to the ED for symptoms of low blood sugar?:  No          ,   COPD Assessment    Does the patient understand envrionmental exposure?:  Yes  Is the patient able to verbalize Rescue vs. Long Acting medications?:  Yes  Does the patient have a nebulizer?:  No  Does the patient use a space with inhaled medications?:  No            Symptoms:          and   General Assessment         Diabetes Assessment    Medic Alert ID:  No  Meal Planning:  Avoidance of concentrated sweets   How often do you test your blood sugar?:  Daily   Do you have barriers with adherence to non-pharmacologic self-management interventions?  (Nutrition/Exercise/Self-Monitoring):  No   Have you ever had to go to the ED for symptoms of low blood sugar?:  No              Health Maintenance Due   Topic Date Due    DTaP/Tdap/Td vaccine (1 - Tdap) 05/10/1953    Shingles Vaccine (1 of 2) 05/10/1984    Pneumococcal 65+ years Vaccine (1 of 2 - PCV13) 05/10/1999    Diabetic retinal exam  05/10/2009    Lipid screen  04/03/2019         Care Coordination Interventions    Program Enrollment:  Rising Risk  Referral from Primary Care Provider:  No  Suggested Interventions and Community Resources  Assocation for Blind:  Not Started  Diabetes Education:  Declined (Comment: Pt referred to [facility] ED, has not agreed to appt)  Fall Risk Prevention:  Completed  Senior Services:  Not Started  Transportation Support:  Completed  Zone Management Tools:  Completed         Goals Addressed     None          Prior to Admission medications    Medication Sig Start Date End Date

## 2019-05-24 ENCOUNTER — HOSPITAL ENCOUNTER (OUTPATIENT)
Age: 84
Discharge: HOME | End: 2019-05-26
Payer: MEDICARE

## 2019-05-24 ENCOUNTER — HOSPITAL ENCOUNTER (OUTPATIENT)
Dept: GENERAL RADIOLOGY | Age: 84
Discharge: HOME | End: 2019-05-26
Payer: MEDICARE

## 2019-05-24 ENCOUNTER — HOSPITAL ENCOUNTER (OUTPATIENT)
Age: 84
Discharge: HOME | End: 2019-05-24
Payer: MEDICARE

## 2019-05-24 DIAGNOSIS — D63.8 ANEMIA OF CHRONIC DISEASE: ICD-10-CM

## 2019-05-24 DIAGNOSIS — M54.50 ACUTE LEFT-SIDED LOW BACK PAIN WITHOUT SCIATICA: ICD-10-CM

## 2019-05-24 DIAGNOSIS — M46.1 SACROILIITIS (HCC): ICD-10-CM

## 2019-05-24 DIAGNOSIS — N18.30 CKD STAGE 3 DUE TO TYPE 2 DIABETES MELLITUS (HCC): ICD-10-CM

## 2019-05-24 DIAGNOSIS — E11.22 CKD STAGE 3 DUE TO TYPE 2 DIABETES MELLITUS (HCC): ICD-10-CM

## 2019-05-24 LAB
-: NORMAL
AMORPHOUS: NORMAL
BACTERIA: NORMAL
BILIRUBIN URINE: NEGATIVE
CASTS UA: NORMAL /LPF
COLOR: YELLOW
COMMENT UA: ABNORMAL
CRYSTALS, UA: NORMAL /HPF
EPITHELIAL CELLS UA: NORMAL /HPF
GLUCOSE URINE: NEGATIVE
KETONES, URINE: NEGATIVE
LEUKOCYTE ESTERASE, URINE: NEGATIVE
MUCUS: NORMAL
NITRITE, URINE: NEGATIVE
OTHER OBSERVATIONS UA: NORMAL
PH UA: 6 (ref 5–8)
PROTEIN UA: ABNORMAL
RBC UA: NORMAL /HPF (ref 0–2)
RENAL EPITHELIAL, UA: NORMAL /HPF
SPECIFIC GRAVITY UA: 1.01 (ref 1–1.03)
TRICHOMONAS: NORMAL
TURBIDITY: CLEAR
URINE HGB: NEGATIVE
UROBILINOGEN, URINE: NORMAL
WBC UA: NORMAL /HPF
YEAST: NORMAL

## 2019-05-24 PROCEDURE — 72110 X-RAY EXAM L-2 SPINE 4/>VWS: CPT

## 2019-05-24 PROCEDURE — 81001 URINALYSIS AUTO W/SCOPE: CPT

## 2019-05-29 ENCOUNTER — HOSPITAL ENCOUNTER (OUTPATIENT)
Age: 84
Discharge: HOME | End: 2019-05-29
Payer: MEDICARE

## 2019-05-29 DIAGNOSIS — E78.00 PURE HYPERCHOLESTEROLEMIA: ICD-10-CM

## 2019-05-29 LAB
CHOLESTEROL/HDL RATIO: 2.3
CHOLESTEROL: 122 MG/DL
HDLC SERPL-MCNC: 52 MG/DL
LDL CHOLESTEROL: 48 MG/DL (ref 0–130)
PATIENT FASTING?: YES
TRIGL SERPL-MCNC: 109 MG/DL
VLDLC SERPL CALC-MCNC: NORMAL MG/DL (ref 1–30)

## 2019-05-29 PROCEDURE — 80061 LIPID PANEL: CPT

## 2019-05-29 PROCEDURE — 36415 COLL VENOUS BLD VENIPUNCTURE: CPT

## 2019-05-30 ENCOUNTER — CARE COORDINATION (OUTPATIENT)
Dept: CARE COORDINATION | Age: 84
End: 2019-05-30

## 2019-05-30 NOTE — CARE COORDINATION
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 How is back    Reason For Call: Assess for Graduation    Summary:Pt is going today this afternoon to get medications, going to get labs and xray Friday, will f/u next week with him. Patient history; reason for CC:       Episode Started: 8/1/2017  COPD, DM, CKD    · Last PCP appt / notes reviewed: 4/15/19 - f/u 5/13/19  Acute left-sided low back pain without sciatica     Gentle stretching  Will give steroid taper take with food.      - XR LUMBAR SPINE (MIN 4 VIEWS); Future     2. Sacroiliitis ([address]  Consider xray and physical therapy   No radiculopathy or neurological management. - XR LUMBAR SPINE (MIN 4 VIEWS); Future    Specialist appt / notes reviewed:  Neurology; Melida Vance 12/14/18, return in 2 weeks, no notes noted  EMG; NCS bilateral for assessment of neuropathy; Carotid US; Fall Precautions; Use Cane; Labs, CTA head and neck. General surgeon 11/13/18 - Pt was seen for ventral hernia  Was told to see Cardiology for clearance, pt [facility]t [facility] no appt noted. Diabetes Education: Attempted to schedule patient 3/26, 4/19, pt was not ready, no follow up since. ED visits or Hospitalizations? Patient has had 0 ED visits, and 0 hospitalizations since last CC contact. Last ED visit 9/10/2018, last hospitalization none in the last 12 months    Medications Reviewed with today: Yes  Patient verbalizes that he has all of medications; and denies needing any assistance currently. Possible Social Deterrents Reviewed:   · Adequate food/ Transportation?  Yes; Federico Madison, family for transportation    · Is patient independent with ADL's/IADL'S? 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 (Nutrition/Exercise/Self-Monitoring):  No   Have you ever had to go to the ED for symptoms of low blood sugar?:  No          ,   COPD Assessment    Does the patient understand envrionmental exposure?:  Yes  Is the patient able to verbalize Rescue vs. Long Acting medications?:  Yes  Does the patient have a nebulizer?:  No  Does the patient use a space with inhaled medications?:  No            Symptoms:          and   General Assessment         Diabetes Assessment    Medic Alert ID:  No  Meal Planning:  Avoidance of concentrated sweets   How often do you test your blood sugar?:  Daily   Do you have barriers with adherence to non-pharmacologic self-management interventions?  (Nutrition/Exercise/Self-Monitoring):  No   Have you ever had to go to the ED for symptoms of low blood sugar?:  No              Health Maintenance Due   Topic Date Due    DTaP/Tdap/Td vaccine (1 - Tdap) 05/10/1953    Shingles Vaccine (1 of 2) 05/10/1984    Pneumococcal 65+ years Vaccine (1 of 2 - PCV13) 05/10/1999    Diabetic retinal exam  05/10/2009    Lipid screen  04/03/2019         Care Coordination Interventions    Program Enrollment:  Rising Risk  Referral from Primary Care Provider:  No  Suggested Interventions and Community Resources  Assocation for Blind:  Not Started  Diabetes Education:  Declined (Comment: Pt referred to [facility] ED, has not agreed to appt)  Fall Risk Prevention:  Completed  Senior Services:  Not Started  Transportation Support:  Completed  Zone Management Tools:  Completed Goals Addressed     None          Prior to Admission medications    Medication Sig Start Date End Date Taking? Authorizing Provider   meloxicam (MOBIC) 7.5 MG tablet Take 1 tablet by mouth daily as needed for Pain For arthritis take with food 5/21/19   NOHELIA Penn CNP   simvastatin (ZOCOR) 10 MG tablet take 1 tablet by mouth every evening 5/1/19   NOHELIA Penn CNP   rOPINIRole (REQUIP) 0.5 MG tablet take 1 tablet by mouth every evening if needed for RESTLESS LEG SYNDROME 4/22/19   NOHELIA Penn CNP   traZODone (DESYREL) 50 MG tablet take 1 tablet by mouth every evening 4/5/19   NOHELIA Penn CNP   fluticasone (FLONASE) 50 MCG/ACT nasal spray 1 spray by Each Nare route daily 1 Spray in each nostril 3/11/19   NOHELIA Tovar CNP   albuterol sulfate HFA (PROVENTIL HFA) 108 (90 Base) MCG/ACT inhaler Inhale 2 puffs into the lungs every 6 hours as needed for Wheezing 3/6/19   NOHELIA Penn CNP   glucose blood VI test strips (EXACTECH TEST) strip 1 each by In Vitro route 3 times daily As needed. 3/26/18   NOHELIA Penn CNP   NONFORMULARY Take 1 tablet by mouth as needed (Leg cramp relief) Indications: Leg Cramps José's leg cramp relief 50 quick-dissolving tablets, homeopathic.     Historical Provider, MD       Future Appointments   Date Time Provider Mia Gutierrez   8/12/2019  2:00 PM NOHELIA Penn CNP [facility]   8/12/2019  2:00 PM [facility]  None

## 2019-06-06 ENCOUNTER — CARE COORDINATION (OUTPATIENT)
Dept: CARE COORDINATION | Age: 84
End: 2019-06-06

## 2019-06-06 NOTE — CARE COORDINATION
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 CC Plan for next Outreach:   Did he get urinalysis and back xray completed, did he get Mobic? How is back    Reason For Call: Assess for Graduation    Summary:Pt is going today this afternoon to get medications, going to get labs and xray Friday, will f/u next week with him. Patient history; reason for CC:       Episode Started: 8/1/2017  COPD, DM, CKD    · Last PCP appt / notes reviewed: 4/15/19 - f/u 5/13/19  Acute left-sided low back pain without sciatica     Gentle stretching  Will give steroid taper take with food.      - XR LUMBAR SPINE (MIN 4 VIEWS); Future     2. Sacroiliitis ([address]  Consider xray and physical therapy   No radiculopathy or neurological management. - XR LUMBAR SPINE (MIN 4 VIEWS); Future    Specialist appt / notes reviewed:  Neurology; Harlo Appl 12/14/18, return in 2 weeks, no notes noted  EMG; NCS bilateral for assessment of neuropathy; Carotid US; Fall Precautions; Use Cane; Labs, CTA head and neck. General surgeon 11/13/18 - Pt was seen for ventral hernia  Was told to see Cardiology for clearance, [facility] no appt noted. Diabetes Education: Attempted to schedule patient 3/26, 4/19, pt was not ready, no follow up since. ED visits or Hospitalizations? Patient has had 0 ED visits, and 0 hospitalizations since last CC contact. Last ED visit 9/10/2018, last hospitalization none in the last 12 months    Medications Reviewed with today: Yes  Patient verbalizes that he has all of medications; and denies needing any assistance currently. Possible Social Deterrents Reviewed:   · Adequate food/ Transportation?  Yes; family Pavithra for transportation    · Is patient independent with Date Taking? Authorizing Provider   meloxicam (MOBIC) 7.5 MG tablet Take 1 tablet by mouth daily as needed for Pain For arthritis take with food 5/21/19   NOHELIA Kohli CNP   simvastatin (ZOCOR) 10 MG tablet take 1 tablet by mouth every evening 5/1/19   NOHELIA Kohli CNP   rOPINIRole (REQUIP) 0.5 MG tablet take 1 tablet by mouth every evening if needed for RESTLESS LEG SYNDROME 4/22/19   NOHELIA Kohli CNP   traZODone (DESYREL) 50 MG tablet take 1 tablet by mouth every evening 4/5/19   NOHELIA Kohli CNP   fluticasone (FLONASE) 50 MCG/ACT nasal spray 1 spray by Each Nare route daily 1 Spray in each nostril 3/11/19   NOHELIA Santoro CNP   albuterol sulfate HFA (PROVENTIL HFA) 108 (90 Base) MCG/ACT inhaler Inhale 2 puffs into the lungs every 6 hours as needed for Wheezing 3/6/19   NOHELIA Kohli CNP   glucose blood VI test strips (EXACTECH TEST) strip 1 each by In Vitro route 3 times daily As needed. 3/26/18   NOHELIA Kohli CNP   NONFORMULARY Take 1 tablet by mouth as needed (Leg cramp relief) Indications: Leg Cramps José's leg cramp relief 50 quick-dissolving tablets, homeopathic.     Historical Provider, MD       Future Appointments   Date Time Provider Mai Gutierrez   8/12/2019  2:00 PM NOHELIA Kohli CNP [facility]   8/12/2019  2:00 PM [facility]

## 2019-06-08 ENCOUNTER — HOSPITAL ENCOUNTER (EMERGENCY)
Age: 84
Discharge: HOME | End: 2019-06-08
Payer: MEDICARE

## 2019-06-08 VITALS
TEMPERATURE: 98.1 F | BODY MASS INDEX: 27.22 KG/M2 | SYSTOLIC BLOOD PRESSURE: 143 MMHG | HEART RATE: 80 BPM | WEIGHT: 163.36 LBS | RESPIRATION RATE: 18 BRPM | HEIGHT: 65 IN | DIASTOLIC BLOOD PRESSURE: 78 MMHG | OXYGEN SATURATION: 97 %

## 2019-06-08 DIAGNOSIS — J01.00 ACUTE MAXILLARY SINUSITIS, RECURRENCE NOT SPECIFIED: Primary | ICD-10-CM

## 2019-06-08 PROCEDURE — 99282 EMERGENCY DEPT VISIT SF MDM: CPT

## 2019-06-08 RX ORDER — LORATADINE 10 MG/1
10 CAPSULE, LIQUID FILLED ORAL DAILY PRN
COMMUNITY
End: 2019-08-31

## 2019-06-08 RX ORDER — DOXYCYCLINE HYCLATE 100 MG
100 TABLET ORAL 2 TIMES DAILY
Qty: 20 TABLET | Refills: 0 | Status: SHIPPED | OUTPATIENT
Start: 2019-06-08 | End: 2019-06-18

## 2019-06-08 NOTE — ED PROVIDER NOTES
or organizations: None     Relationship status: None    Intimate partner violence:     Fear of current or ex partner: None     Emotionally abused: None     Physically abused: None     Forced sexual activity: None   Other Topics Concern    None   Social History Narrative    None       PHYSICAL EXAM    VITAL SIGNS: BP (!) 143/78   Pulse 80   Temp 98.1 °F (36.7 °C) (Oral)   Resp 18   Ht 5' 5\" (1.651 m)   Wt 163 lb 5.8 oz (74.1 kg)   SpO2 97%   BMI 27.18 kg/m²   Constitutional:  Well developed, well nourished, moderate acute distress, non-toxic appearance   Eyes: PERRL, conjunctiva normal   HENT:  Ears normal.  Nose-bilateral maxillary sinus tenderness with nasal mucosal swelling. Throat erythematous without exudate. Neck- supple without lymphadenopathy. Respiratory:  Lungs clear  Cardiovascular:  Normal rate, normal rhythm, no murmurs, no gallops, no rubs   Musculoskeletal:  No edema   Integument:  Well hydrated, no rash     RADIOLOGY/PROCEDURES        ED COURSE & MEDICAL DECISION MAKING    Pertinent Labs & Imaging studies reviewed. (See chart for details)  Treat with doxycycline. Patient was stable on discharge. FINAL IMPRESSION    1. Acute maxillary sinusitis. 2.      Summation      Patient Course: Patient was stable on discharge. ED Medications administered this visit:  Medications - No data to display    New Prescriptions from this visit:    New Prescriptions    DOXYCYCLINE HYCLATE (VIBRA-TABS) 100 MG TABLET    Take 1 tablet by mouth 2 times daily for 10 days       Follow-up:  NOHELIA Briceno - CNP  [address]  [phone]    Call in 2 days          Final Impression:   1.  Acute maxillary sinusitis, recurrence not specified New Problem              (Please note that portions of this note were completed with a voice recognition program.  Efforts were made to edit the dictations but occasionally words are mis-transcribed.)       Félix Bustillos MD  06/08/19 [id number]

## 2019-06-10 ENCOUNTER — TELEPHONE (OUTPATIENT)
Dept: FAMILY MEDICINE CLINIC | Age: 84
End: 2019-06-10

## 2019-06-14 ENCOUNTER — CARE COORDINATION (OUTPATIENT)
Dept: CARE COORDINATION | Age: 84
End: 2019-06-14

## 2019-06-14 NOTE — CARE COORDINATION
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 Reason For Call: Recent ED visit    Summary:Pt had an ED visit for Sinusitis, states still on Antibiotics, Doxycycline, he is taking his Flonase and Claritin, today he is feeling a little better, still sounds hoarse on the phone, clearing his throat, continues to have productive cough. No f/u till Aug, discussed if he is not feeling better next week will make s/u with Francia Velez. He agreed    Patient history; reason for CC:       Episode Started: 8/1/2017  COPD, DM, CKD    · Last PCP appt / notes reviewed: 4/15/19 - f/u 5/13/19  Acute left-sided low back pain without sciatica     Gentle stretching  Will give steroid taper take with food.      - XR LUMBAR SPINE (MIN 4 VIEWS); Future     2. Sacroiliitis ([address]  Consider xray and physical therapy   No radiculopathy or neurological management. - XR LUMBAR SPINE (MIN 4 VIEWS); Future    Specialist appt / notes reviewed:  Neurology; José Luis Tinsley 12/14/18, return in 2 weeks, no notes noted  EMG; NCS bilateral for assessment of neuropathy; Carotid US; Fall Precautions; Use Cane; Labs, CTA head and neck. General surgeon 11/13/18 - Pt was seen for ventral hernia  Was told to see Cardiology for clearance, pt did not make appt told [facility] office \"will be back in Jan\", no appt noted. Diabetes Education: Attempted to schedule patient 3/26, 4/19, pt was not ready, no follow up since. ED visits or Hospitalizations? Patient has had 1 ED visits, and 0 hospitalizations since last CC contact. Last ED visit 6/8/19, last hospitalization none in the last 12 months    Medications Reviewed with today: Yes  Patient verbalizes that he has all of medications; and denies needing any assistance currently.       Possible Social Deterrents Reviewed:   · Adequate food/ Transportation? Yes; Federico Madison, family for transportation    · Is patient independent with ADL's/IADL'S? 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 (Nutrition/Exercise/Self-Monitoring):  No   Have you ever had to go to the ED for symptoms of low blood sugar?:  No       Do you have hyperglycemia symptoms?:  No   Do you have hypoglycemia symptoms?:  No   Last Blood Sugar Value:  102      ,   COPD Assessment    Does the patient understand envrionmental exposure?:  Yes  Is the patient able to verbalize Rescue vs. Long Acting medications?:  Yes  Does the patient have a nebulizer?:  No  Does the patient use a space with inhaled medications?:  No            Symptoms:      Symptom course:  improving  Change in chronic cough?:  No/At Baseline  Change in sputum?:  No/At Baseline      and   General Assessment         Diabetes Assessment    Medic Alert ID:  No  Meal Planning:  Avoidance of concentrated sweets   How often do you test your blood sugar?:  Daily   Do you have barriers with adherence to non-pharmacologic self-management interventions?  (Nutrition/Exercise/Self-Monitoring):  No   Have you ever had to go to the ED for symptoms of low blood sugar?:  No       Do you have hyperglycemia symptoms?:  No   Do you have hypoglycemia symptoms?:  No   Last Blood Sugar Value:  102          Health Maintenance Due   Topic Date Due    DTaP/Tdap/Td vaccine (1 - Tdap) 05/10/1953    Shingles Vaccine (1 of 2) 05/10/1984    Pneumococcal 65+ years Vaccine (1 of 2 - PCV13) 05/10/1999    Diabetic retinal exam  05/10/2009         Care Coordination Interventions    Program Enrollment:  Rising Risk  Referral from Primary Care Provider:  No  Suggested Interventions and Community Resources  Assocation for Blind:  Not Started  Diabetes Education:  Declined (Comment: Pt referred to [facility] ED, has not agreed to appt)  Fall Risk Prevention:  Completed  Senior Services:  Not Started  Transportation Support:  Completed  Zone Management Tools:  Completed         Goals Addressed     None          Prior to Admission medications    Medication Sig Start Date End Date Taking? Authorizing Provider   loratadine (CLARITIN) 10 MG capsule Take 10 mg by mouth daily as needed    Historical Provider, MD   doxycycline hyclate (VIBRA-TABS) 100 MG tablet Take 1 tablet by mouth 2 times daily for 10 days 6/8/19 6/18/19  Gena Smyth MD   meloxicam (MOBIC) 7.5 MG tablet Take 1 tablet by mouth daily as needed for Pain For arthritis take with food 5/21/19   NOHELIA Vinsno CNP   simvastatin (ZOCOR) 10 MG tablet take 1 tablet by mouth every evening 5/1/19   NOHELIA Vinson CNP   rOPINIRole (REQUIP) 0.5 MG tablet take 1 tablet by mouth every evening if needed for RESTLESS LEG SYNDROME 4/22/19   NOHELIA Vinson CNP   traZODone (DESYREL) 50 MG tablet take 1 tablet by mouth every evening 4/5/19   NOHELIA Vinson CNP   fluticasone (FLONASE) 50 MCG/ACT nasal spray 1 spray by Each Nare route daily 1 Spray in each nostril 3/11/19   NOHELIA Hawthorne CNP   albuterol sulfate HFA (PROVENTIL HFA) 108 (90 Base) MCG/ACT inhaler Inhale 2 puffs into the lungs every 6 hours as needed for Wheezing 3/6/19   NOHELIA Vinson CNP   glucose blood VI test strips (EXACTECH TEST) strip 1 each by In Vitro route 3 times daily As needed. 3/26/18   NOHELIA Vinson CNP   NONFORMULARY Take 1 tablet by mouth as needed (Leg cramp relief) Indications: Leg Cramps José's leg cramp relief 50 quick-dissolving tablets, homeopathic.     Historical Provider, MD       Future Appointments   Date Time Provider Department

## 2019-06-20 ENCOUNTER — OFFICE VISIT (OUTPATIENT)
Dept: FAMILY MEDICINE CLINIC | Age: 84
End: 2019-06-20
Payer: MEDICARE

## 2019-06-20 ENCOUNTER — CARE COORDINATION (OUTPATIENT)
Dept: CARE COORDINATION | Age: 84
End: 2019-06-20

## 2019-06-20 VITALS
DIASTOLIC BLOOD PRESSURE: 60 MMHG | WEIGHT: 161 LBS | BODY MASS INDEX: 26.79 KG/M2 | HEART RATE: 74 BPM | TEMPERATURE: 98.4 F | OXYGEN SATURATION: 98 % | SYSTOLIC BLOOD PRESSURE: 136 MMHG

## 2019-06-20 DIAGNOSIS — R09.82 PND (POST-NASAL DRIP): Primary | ICD-10-CM

## 2019-06-20 DIAGNOSIS — I10 ESSENTIAL HYPERTENSION, BENIGN: ICD-10-CM

## 2019-06-20 DIAGNOSIS — J30.2 SEASONAL ALLERGIES: ICD-10-CM

## 2019-06-20 PROCEDURE — G8427 DOCREV CUR MEDS BY ELIG CLIN: HCPCS | Performed by: NURSE PRACTITIONER

## 2019-06-20 PROCEDURE — 1123F ACP DISCUSS/DSCN MKR DOCD: CPT | Performed by: NURSE PRACTITIONER

## 2019-06-20 PROCEDURE — G8417 CALC BMI ABV UP PARAM F/U: HCPCS | Performed by: NURSE PRACTITIONER

## 2019-06-20 PROCEDURE — 4040F PNEUMOC VAC/ADMIN/RCVD: CPT | Performed by: NURSE PRACTITIONER

## 2019-06-20 PROCEDURE — 99213 OFFICE O/P EST LOW 20 MIN: CPT | Performed by: NURSE PRACTITIONER

## 2019-06-20 PROCEDURE — 1036F TOBACCO NON-USER: CPT | Performed by: NURSE PRACTITIONER

## 2019-06-20 RX ORDER — LISINOPRIL 2.5 MG/1
TABLET ORAL
Refills: 0 | COMMUNITY
Start: 2019-06-02 | End: 2019-06-20 | Stop reason: SDUPTHER

## 2019-06-20 RX ORDER — MONTELUKAST SODIUM 10 MG/1
TABLET ORAL
Refills: 0 | COMMUNITY
Start: 2019-06-02

## 2019-06-20 RX ORDER — LISINOPRIL 2.5 MG/1
2.5 TABLET ORAL DAILY
Qty: 30 TABLET | Refills: 2 | Status: SHIPPED | OUTPATIENT
Start: 2019-06-20 | End: 2019-06-21 | Stop reason: DRUGHIGH

## 2019-06-20 ASSESSMENT — ENCOUNTER SYMPTOMS
VOMITING: 0
DIARRHEA: 0
COUGH: 0
NAUSEA: 0
SHORTNESS OF BREATH: 0

## 2019-06-20 NOTE — CARE COORDINATION
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 Reason For Call:  Cough, Sinus symptoms    Summary:Pt states he is going over to see [facility] today for appt due to continued / worsening in symptoms. Continues to have cough, drainage, etc.  He will call me if he needs something sooner otherwise I will check back next week. Patient history; reason for CC:       Episode Started: 8/1/2017  COPD, DM, CKD    · Last PCP appt / notes reviewed: 4/15/19 - f/u 5/13/19  Acute left-sided low back pain without sciatica     Gentle stretching  Will give steroid taper take with food.      - XR LUMBAR SPINE (MIN 4 VIEWS); Future     2. Sacroiliitis ([address]  Consider xray and physical therapy   No radiculopathy or neurological management. - XR LUMBAR SPINE (MIN 4 VIEWS); Future    Specialist appt / notes reviewed:  Neurology; Murali Jama 12/14/18, return in 2 weeks, no notes noted  EMG; NCS bilateral for assessment of neuropathy; Carotid US; Fall Precautions; Use Cane; Labs, CTA head and neck. General surgeon 11/13/18 - Pt was seen for ventral hernia  Was told to see Cardiology for clearance, pt [facility] no appt noted. Diabetes Education: Attempted to schedule patient 3/26, 4/19, pt was not ready, no follow up since. ED visits or Hospitalizations? Patient has had 1 ED visits, and 0 hospitalizations since last CC contact. Last ED visit 6/8/19, last hospitalization none in the last 12 months    Medications Reviewed with today: Yes  Patient verbalizes that he has all of medications; and denies needing any assistance currently. Possible Social Deterrents Reviewed:   · Adequate food/ Transportation?  Yes; Federico Madison, family for transportation    · Is patient independent with ADL's/IADL'S? 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 (Nutrition/Exercise/Self-Monitoring):  No   Have you ever had to go to the ED for symptoms of low blood sugar?:  No          ,   COPD Assessment    Does the patient understand envrionmental exposure?:  Yes  Is the patient able to verbalize Rescue vs. Long Acting medications?:  Yes  Does the patient have a nebulizer?:  No  Does the patient use a space with inhaled medications?:  No            Symptoms:          and   General Assessment         Diabetes Assessment    Medic Alert ID:  No  Meal Planning:  Avoidance of concentrated sweets   How often do you test your blood sugar?:  Daily   Do you have barriers with adherence to non-pharmacologic self-management interventions?  (Nutrition/Exercise/Self-Monitoring):  No   Have you ever had to go to the ED for symptoms of low blood sugar?:  No              Health Maintenance Due   Topic Date Due    DTaP/Tdap/Td vaccine (1 - Tdap) 05/10/1953    Shingles Vaccine (1 of 2) 05/10/1984    Pneumococcal 65+ years Vaccine (1 of 2 - PCV13) 05/10/1999    Diabetic retinal exam  05/10/2009    Annual Wellness Visit (AWV)  03/07/2017         Care Coordination Interventions    Program Enrollment:  Rising Risk  Referral from Primary Care Provider:  No  Suggested Interventions and Community Resources  Assocation for Blind:  Not Started  Diabetes Education:  Declined (Comment: [facility], has not agreed to appt)  Fall Risk Prevention:  Completed  Senior Services:  Not Started  Transportation Support:  Completed  Zone Management Tools:  Completed         Goals Addressed     None

## 2019-06-20 NOTE — PROGRESS NOTES
HPI Notes    Name: Parminder Aviles  : 1934         Chief Complaint:     Chief Complaint   Patient presents with    Sinusitis     Patient complains of cough, sinus drainage, sinus pressure. Started 2 to 3 week ago. History of Present Illness:        Sinusitis   This is a chronic problem. The current episode started more than 1 year ago. The problem has been waxing and waning since onset. There has been no fever. Associated symptoms include congestion. Pertinent negatives include no chills, coughing, headaches or shortness of breath. (Runny nose  PND) Past treatments include oral decongestants. Past Medical History:     Past Medical History:   Diagnosis Date    Back pain     Blind right eye     left eye poor vision also    Diabetes mellitus ([address]     Diverticulosis     Glaucoma     Hernia, ventral     Hypothyroidism (acquired)       Reviewed all health maintenance requirements and ordered appropriate tests  Health Maintenance Due   Topic Date Due    DTaP/Tdap/Td vaccine (1 - Tdap) 05/10/1953    Shingles Vaccine (1 of 2) 05/10/1984    Pneumococcal 65+ years Vaccine (1 of 2 - PCV13) 05/10/1999    Diabetic retinal exam  05/10/2009    Annual Wellness Visit (AWV)  2017       Past Surgical History:     Past Surgical History:   Procedure Laterality Date   Nilda Ferrari      Dr. Holly Johnson    ERCP  2017    balloon sweep,    ERCP N/A 2017    ERCP ENDOSCOPIC RETROGRADE CHOLANGIOPANCREATOGRAPHY WITH BALLOON SWEEP AND CHOLANGIOGRAM, GI UNIT, C-ARM  performed by Chano Schmid MD at [address]      blood in the eyeball    OTHER SURGICAL HISTORY  16    Laparoscopic selena. converted to open selena.  TONSILLECTOMY          Medications:       Prior to Admission medications    Medication Sig Start Date End Date Taking?  Authorizing Provider   loratadine (CLARITIN) 10 MG capsule Take 10 mg by mouth daily as needed   Yes Historical Provider, MD   meloxicam (MOBIC) 7.5 MG tablet Take 1 tablet by mouth daily as needed for Pain For arthritis take with food 5/21/19  Yes NOHELIA Kohli CNP   simvastatin (ZOCOR) 10 MG tablet take 1 tablet by mouth every evening 5/1/19  Yes NOHELIA Kohli CNP   rOPINIRole (REQUIP) 0.5 MG tablet take 1 tablet by mouth every evening if needed for RESTLESS LEG SYNDROME 4/22/19  Yes NOHELIA Kohli CNP   traZODone (DESYREL) 50 MG tablet take 1 tablet by mouth every evening 4/5/19  Yes NOHELIA Kohli CNP   fluticasone (FLONASE) 50 MCG/ACT nasal spray 1 spray by Each Nare route daily 1 Spray in each nostril 3/11/19  Yes NOHELIA Santoro CNP   albuterol sulfate HFA (PROVENTIL HFA) 108 (90 Base) MCG/ACT inhaler Inhale 2 puffs into the lungs every 6 hours as needed for Wheezing 3/6/19  Yes NOHELIA Kohli CNP   glucose blood VI test strips (EXACTECH TEST) strip 1 each by In Vitro route 3 times daily As needed. 3/26/18  Yes NOHELIA Kohli CNP   NONFORMULARY Take 1 tablet by mouth as needed (Leg cramp relief) Indications: Leg Cramps José's leg cramp relief 50 quick-dissolving tablets, homeopathic. Yes Historical Provider, MD   montelukast (SINGULAIR) 10 MG tablet take 1 tablet by mouth once daily 6/2/19   Historical Provider, MD   lisinopril (PRINIVIL;ZESTRIL) 2.5 MG tablet  6/2/19   Historical Provider, MD        Allergies:       Aspirin; Codeine; and Penicillins    Social History:     Tobacco:    reports that he has never smoked. He has never used smokeless tobacco.  Alcohol:      reports that he does not drink alcohol. Drug Use:  reports that he does not use drugs.     Family History:        Family History   Problem Relation Age of Onset   Shane Flax Cancer Mother         colon    Cancer Sister         colon    Cancer Other         colon    Cancer Sister         colon       Review of Systems:         Review of Systems   Constitutional: Negative for chills and fever. HENT: Positive for congestion. Respiratory: Negative for cough and shortness of breath. Cardiovascular: Negative for chest pain and palpitations. Gastrointestinal: Negative for diarrhea, nausea and vomiting. Neurological: Negative for dizziness, seizures and headaches. Physical Exam:     Vitals:  /60   Pulse 74   Temp 98.4 °F (36.9 °C) (Oral)   Wt 161 lb (73 kg)   SpO2 98%   BMI 26.79 kg/m²       Physical Exam   Constitutional: Vital signs are normal. He appears well-developed and well-nourished. He is cooperative. He does not appear ill. No distress. HENT:   Right Ear: Tympanic membrane normal.   Left Ear: Tympanic membrane normal.   Nose: Mucosal edema present. Right sinus exhibits no maxillary sinus tenderness and no frontal sinus tenderness. Left sinus exhibits no maxillary sinus tenderness and no frontal sinus tenderness. Mouth/Throat: Posterior oropharyngeal erythema present. No posterior oropharyngeal edema. Cardiovascular: Normal rate, regular rhythm, S1 normal and S2 normal.   Pulmonary/Chest: Effort normal and breath sounds normal. No respiratory distress. Abdominal: Soft. Bowel sounds are normal. There is no tenderness. Skin: Skin is warm, dry and intact. Nursing note and vitals reviewed.             Data:     Lab Results   Component Value Date     04/29/2019    K 4.0 04/29/2019     04/29/2019    CO2 27 04/29/2019    BUN 22 04/29/2019    CREATININE 1.72 04/29/2019    GLUCOSE 198 04/29/2019    GLUCOSE 146 01/10/2012    PROT 6.6 04/29/2019    LABALBU 4.1 04/29/2019    LABALBU 4.4 01/10/2012    BILITOT 0.37 04/29/2019    ALKPHOS 64 04/29/2019    AST 22 04/29/2019    ALT 14 04/29/2019     Lab Results   Component Value Date    WBC 4.7 04/29/2019    RBC 3.22 04/29/2019    RBC 4.07 01/10/2012    HGB 9.7 04/29/2019    HCT 28.6 04/29/2019    MCV 89.0 04/29/2019    MCH 30.2 04/29/2019    MCHC 33.9 04/29/2019    RDW 14.8 04/29/2019     04/29/2019     01/10/2012    MPV NOT REPORTED 04/29/2019     Lab Results   Component Value Date    TSH 2.44 04/29/2019     Lab Results   Component Value Date    CHOL 122 05/29/2019    HDL 52 05/29/2019    PSA 0.22 04/30/2019    LABA1C 8.1 02/04/2019          Assessment & Plan        Diagnosis Orders   1. PND (post-nasal drip)  --pt instructed to take flonase, claritin, and singulair regularly. Will consider allergy testing for persistent symptoms. 2. Seasonal allergies     3. Essential hypertension, benign   --pt states that he got a low bp once so he stopped taking Lisinopril and threw them away. BP ok today, but high normal. Pt advised to take Lisinopril 2.5mg daily. Hold for SBP < 100. Pt to check BP prior to taking medication. Patient verbalizes understanding and agreement with plan. All questions answered. If symptoms do not resolve or worsen, return to office. Completed Refills   Requested Prescriptions      No prescriptions requested or ordered in this encounter     No follow-ups on file. No orders of the defined types were placed in this encounter. No orders of the defined types were placed in this encounter. Patient Instructions     SURVEY:    You may be receiving a survey from [employer] regarding your visit today. Please complete the survey to enable us to provide the highest quality of care to you and your family. If you cannot score us a very good on any question, please call the office to discuss how we could have made your experience a very good one. Thank you.       Electronically signed by NOHELIA Anguiano CNP on 6/20/2019 at 11:39 AM           Completed Refills      Requested Prescriptions      No prescriptions requested or ordered in this encounter         Clif received counseling on the following healthy behaviors: medication adherence  Reviewed prior labs and health maintenance. Continue current medications, diet and exercise. Discussed use, benefit, and side effects of prescribed medications. Barriers to medication compliance addressed. Patient given educational materials - see patient instructions. All patient questions answered. Patient voiced understanding.

## 2019-06-21 ENCOUNTER — CARE COORDINATION (OUTPATIENT)
Dept: CARE COORDINATION | Age: 84
End: 2019-06-21

## 2019-06-21 RX ORDER — LISINOPRIL 2.5 MG/1
2.5 TABLET ORAL DAILY
COMMUNITY
End: 2019-06-27 | Stop reason: SINTOL

## 2019-06-24 ENCOUNTER — CARE COORDINATION (OUTPATIENT)
Dept: CARE COORDINATION | Age: 84
End: 2019-06-24

## 2019-06-24 ENCOUNTER — TELEPHONE (OUTPATIENT)
Dept: FAMILY MEDICINE CLINIC | Age: 84
End: 2019-06-24

## 2019-06-24 RX ORDER — MELOXICAM 7.5 MG/1
7.5 TABLET ORAL DAILY PRN
Qty: 30 TABLET | Refills: 2 | Status: SHIPPED | OUTPATIENT
Start: 2019-06-24 | End: 2019-07-21 | Stop reason: SINTOL

## 2019-06-24 NOTE — TELEPHONE ENCOUNTER
Call [phone] to schedule ultrasound testing at the [facility].      
Last OV: 6/20/2019  Last RX: 5/21/19  Next scheduled apt: 8/12/2019  Medication pending
stage 3 due to type 2 diabetes mellitus ([facility]     Acquired hypothyroidism     Sacroiliitis ([facility]

## 2019-06-24 NOTE — CARE COORDINATION
Thanks Henny Ellsworth.
Maintenance Due   Topic Date Due    DTaP/Tdap/Td vaccine (1 - Tdap) 05/10/1953    Shingles Vaccine (1 of 2) 05/10/1984    Pneumococcal 65+ years Vaccine (1 of 2 - PCV13) 05/10/1999    Diabetic retinal exam  05/10/2009    Annual Wellness Visit (AWV)  03/07/2017         Care Coordination Interventions    Program Enrollment:  Rising Risk  Referral from Primary Care Provider:  No  Suggested Interventions and Community Resources  Assocation for Blind:  Not Started  Diabetes Education:  Declined (Comment: Pt referred to [facility], has not agreed to appt)  Fall Risk Prevention:  Completed  Senior Services:  Not Started  Transportation Support:  Completed  Zone Management Tools:  Completed         Goals Addressed                 This Visit's Progress     Medication Management   On track     I will take my medication as directed. I will notify my provider of any problems with medications, like adverse effects or side effects. I will notify my provider/Care Coordinator if I am unable to afford my medications. I will notify my provider for advice before I stop taking any of my medication. I will check my blood pressure daily, and if SBP less than 100, I will not take my Lisinopril    Barriers: impairment:  visual, lack of support and overwhelmed by complexity of regimen  Plan for overcoming my barriers: patient to follow with Nurse Care Coordinator  Patient to check Blood Pressure daily in the am.   Patient to check blood sugars daily in the am.  Confidence: 7/10  Anticipated Goal Completion Date: 08/25/19            Prior to Admission medications    Medication Sig Start Date End Date Taking?  Authorizing Provider   lisinopril (PRINIVIL;ZESTRIL) 2.5 MG tablet Take 2.5 mg by mouth daily Patient to check BP daily in the am and hold Lisinopril if SBP less than 100    Historical Provider, MD   montelukast (SINGULAIR) 10 MG tablet take 1 tablet by mouth once daily 6/2/19   Historical Provider, MD   loratadine

## 2019-06-27 ENCOUNTER — OFFICE VISIT (OUTPATIENT)
Dept: FAMILY MEDICINE CLINIC | Age: 84
End: 2019-06-27
Payer: MEDICARE

## 2019-06-27 VITALS
TEMPERATURE: 98.2 F | OXYGEN SATURATION: 98 % | BODY MASS INDEX: 27.46 KG/M2 | HEART RATE: 66 BPM | SYSTOLIC BLOOD PRESSURE: 136 MMHG | WEIGHT: 165 LBS | DIASTOLIC BLOOD PRESSURE: 80 MMHG

## 2019-06-27 DIAGNOSIS — J30.2 SEASONAL ALLERGIES: ICD-10-CM

## 2019-06-27 DIAGNOSIS — R09.82 PND (POST-NASAL DRIP): Primary | ICD-10-CM

## 2019-06-27 PROCEDURE — 96372 THER/PROPH/DIAG INJ SC/IM: CPT | Performed by: NURSE PRACTITIONER

## 2019-06-27 PROCEDURE — 4040F PNEUMOC VAC/ADMIN/RCVD: CPT | Performed by: NURSE PRACTITIONER

## 2019-06-27 PROCEDURE — G8427 DOCREV CUR MEDS BY ELIG CLIN: HCPCS | Performed by: NURSE PRACTITIONER

## 2019-06-27 PROCEDURE — 1036F TOBACCO NON-USER: CPT | Performed by: NURSE PRACTITIONER

## 2019-06-27 PROCEDURE — G8417 CALC BMI ABV UP PARAM F/U: HCPCS | Performed by: NURSE PRACTITIONER

## 2019-06-27 PROCEDURE — 99213 OFFICE O/P EST LOW 20 MIN: CPT | Performed by: NURSE PRACTITIONER

## 2019-06-27 PROCEDURE — 1123F ACP DISCUSS/DSCN MKR DOCD: CPT | Performed by: NURSE PRACTITIONER

## 2019-06-27 RX ORDER — AZITHROMYCIN 250 MG/1
250 TABLET, FILM COATED ORAL SEE ADMIN INSTRUCTIONS
Qty: 6 TABLET | Refills: 0 | Status: SHIPPED | OUTPATIENT
Start: 2019-06-27 | End: 2019-07-02

## 2019-06-27 RX ORDER — TRIAMCINOLONE ACETONIDE 40 MG/ML
40 INJECTION, SUSPENSION INTRA-ARTICULAR; INTRAMUSCULAR ONCE
Status: COMPLETED | OUTPATIENT
Start: 2019-06-27 | End: 2019-06-27

## 2019-06-27 RX ADMIN — TRIAMCINOLONE ACETONIDE 40 MG: 40 INJECTION, SUSPENSION INTRA-ARTICULAR; INTRAMUSCULAR at 11:48

## 2019-06-27 ASSESSMENT — ENCOUNTER SYMPTOMS
COUGH: 0
DIARRHEA: 0
VOMITING: 0
SHORTNESS OF BREATH: 0
NAUSEA: 0
RHINORRHEA: 1

## 2019-06-27 NOTE — PROGRESS NOTES
After obtaining consent, and per orders of  JUANCHO Laguerre, injection of kenalog 40 mg given in Right deltoid by Martell Osborn. Patient instructed to remain in clinic for 20 minutes afterwards, and to report any adverse reaction to me immediately.

## 2019-06-27 NOTE — PROGRESS NOTES
HPI Notes    Name: Stefany Christy  : 1934         Chief Complaint:     Chief Complaint   Patient presents with    Cough     Patient complains of cough. Patient said he is getting worse. Coughing all day and night. Patient is not taking singulair , he says it makes his BP drop.  Sinusitis     Patient complains of head congestion, drainage, pressure around eyes. He said he is feeling weak. History of Present Illness:        Cough   This is a chronic problem. The current episode started more than 1 year ago. The problem has been waxing and waning. The cough is non-productive. Associated symptoms include postnasal drip and rhinorrhea. Pertinent negatives include no chest pain, chills, fever, headaches or shortness of breath. The symptoms are aggravated by dust and lying down. Pt has recurrent post nasal drip. Has been treated multiple times. Pt \"thinks\" he is taking singulair. Pt gets confused with medication names and doses.    Past Medical History:     Past Medical History:   Diagnosis Date    Back pain     Blind right eye     left eye poor vision also    Diabetes mellitus ([address]     Diverticulosis     Glaucoma     Hernia, ventral     Hypothyroidism (acquired)       Reviewed all health maintenance requirements and ordered appropriate tests  Health Maintenance Due   Topic Date Due    DTaP/Tdap/Td vaccine (1 - Tdap) 05/10/1953    Shingles Vaccine (1 of 2) 05/10/1984    Pneumococcal 65+ years Vaccine (1 of 2 - PCV13) 05/10/1999    Diabetic retinal exam  05/10/2009    Annual Wellness Visit (AWV)  2017       Past Surgical History:     Past Surgical History:   Procedure Laterality Date   Cody Childs Ebbs      Dr. Adams    ERCP  2017    balloon sweep,    ERCP N/A 2017    ERCP ENDOSCOPIC RETROGRADE CHOLANGIOPANCREATOGRAPHY WITH BALLOON SWEEP AND CHOLANGIOGRAM, GI UNIT, C-ARM performed by Bethany Milton MD at [address] [facility] [facility]      blood in the eyeball    OTHER SURGICAL HISTORY  05-20-16    Laparoscopic selena. converted to open selena.  TONSILLECTOMY          Medications:       Prior to Admission medications    Medication Sig Start Date End Date Taking? Authorizing Provider   azithromycin (ZITHROMAX) 250 MG tablet Take 1 tablet by mouth See Admin Instructions for 5 days 500mg on day 1 followed by 250mg on days 2 - 5 6/27/19 7/2/19 Yes NOHELIA Dunn CNP   meloxicam (MOBIC) 7.5 MG tablet Take 1 tablet by mouth daily as needed for Pain For arthritis take with food 6/24/19  Yes Naveed Dupree MD   loratadine (CLARITIN) 10 MG capsule Take 10 mg by mouth daily as needed   Yes Historical Provider, MD   simvastatin (ZOCOR) 10 MG tablet take 1 tablet by mouth every evening 5/1/19  Yes NOHELIA Villa CNP   rOPINIRole (REQUIP) 0.5 MG tablet take 1 tablet by mouth every evening if needed for RESTLESS LEG SYNDROME 4/22/19  Yes NOHELIA Villa CNP   traZODone (DESYREL) 50 MG tablet take 1 tablet by mouth every evening 4/5/19  Yes NOHELIA Villa CNP   fluticasone (FLONASE) 50 MCG/ACT nasal spray 1 spray by Each Nare route daily 1 Spray in each nostril 3/11/19  Yes NOHELIA Dunn CNP   albuterol sulfate HFA (PROVENTIL HFA) 108 (90 Base) MCG/ACT inhaler Inhale 2 puffs into the lungs every 6 hours as needed for Wheezing 3/6/19  Yes NOHELIA Villa CNP   glucose blood VI test strips (EXACTECH TEST) strip 1 each by In Vitro route 3 times daily As needed. 3/26/18  Yes NOHELIA Villa CNP   NONFORMULARY Take 1 tablet by mouth as needed (Leg cramp relief) Indications: Leg Cramps José's leg cramp relief 50 quick-dissolving tablets, homeopathic.    Yes Historical Provider, MD   montelukast (SINGULAIR) 10 MG tablet take 1 tablet by mouth once daily 6/2/19   Historical Provider, MD        Allergies:       Aspirin; Codeine; and 198 04/29/2019    GLUCOSE 146 01/10/2012    PROT 6.6 04/29/2019    LABALBU 4.1 04/29/2019    LABALBU 4.4 01/10/2012    BILITOT 0.37 04/29/2019    ALKPHOS 64 04/29/2019    AST 22 04/29/2019    ALT 14 04/29/2019     Lab Results   Component Value Date    WBC 4.7 04/29/2019    RBC 3.22 04/29/2019    RBC 4.07 01/10/2012    HGB 9.7 04/29/2019    HCT 28.6 04/29/2019    MCV 89.0 04/29/2019    MCH 30.2 04/29/2019    MCHC 33.9 04/29/2019    RDW 14.8 04/29/2019     04/29/2019     01/10/2012    MPV NOT REPORTED 04/29/2019     Lab Results   Component Value Date    TSH 2.44 04/29/2019     Lab Results   Component Value Date    CHOL 122 05/29/2019    HDL 52 05/29/2019    PSA 0.22 04/30/2019    LABA1C 8.1 02/04/2019          Assessment & Plan        Diagnosis Orders   1. PND (post-nasal drip)     2. Seasonal allergies       We will treat patient with Kenalog 40 mg IM in office today. We will also send in Z-Ervin to cover any possible chronic sinus infection. Patient educated to continue taking Flonase and Singulair. If these measures do not help this problem, will send to ENT. Patient verbalizes understanding and agreement with plan. All questions answered. If symptoms do not resolve or worsen, return to office. Completed Refills   Requested Prescriptions     Pending Prescriptions Disp Refills    triamcinolone acetonide (KENALOG-40) injection 40 mg       Signed Prescriptions Disp Refills    azithromycin (ZITHROMAX) 250 MG tablet 6 tablet 0     Sig: Take 1 tablet by mouth See Admin Instructions for 5 days 500mg on day 1 followed by 250mg on days 2 - 5     No follow-ups on file. Orders Placed This Encounter   Medications    azithromycin (ZITHROMAX) 250 MG tablet     Sig: Take 1 tablet by mouth See Admin Instructions for 5 days 500mg on day 1 followed by 250mg on days 2 - 5     Dispense:  6 tablet     Refill:  0     No orders of the defined types were placed in this encounter.         Patient Instructions     SURVEY:    You may be receiving a survey from [employer] regarding your visit today. Please complete the survey to enable us to provide the highest quality of care to you and your family. If you cannot score us a very good on any question, please call the office to discuss how we could have made your experience a very good one. Thank you. Electronically signed by NOHELIA Hawthorne CNP on 6/27/2019 at 11:41 AM           Completed Refills      Requested Prescriptions     Pending Prescriptions Disp Refills    triamcinolone acetonide (KENALOG-40) injection 40 mg       Signed Prescriptions Disp Refills    azithromycin (ZITHROMAX) 250 MG tablet 6 tablet 0     Sig: Take 1 tablet by mouth See Admin Instructions for 5 days 500mg on day 1 followed by 250mg on days 2 - 5         Clif received counseling on the following healthy behaviors: medication adherence  Reviewed prior labs and health maintenance. Continue current medications, diet and exercise. Discussed use, benefit, and side effects of prescribed medications. Barriers to medication compliance addressed. Patient given educational materials - see patient instructions. All patient questions answered. Patient voiced understanding.

## 2019-06-27 NOTE — PATIENT INSTRUCTIONS
SURVEY:    You may be receiving a survey from [employer] regarding your visit today. Please complete the survey to enable us to provide the highest quality of care to you and your family. If you cannot score us a very good on any question, please call the office to discuss how we could have made your experience a very good one. Thank you.

## 2019-07-03 ENCOUNTER — CARE COORDINATION (OUTPATIENT)
Dept: CARE COORDINATION | Age: 84
End: 2019-07-03

## 2019-07-03 NOTE — CARE COORDINATION
my barriers: patient to follow with Nurse Care Coordinator  Patient to check Blood Pressure daily in the am.   Patient to check blood sugars daily in the am.  Confidence: 7/10  Anticipated Goal Completion Date: 08/25/19              Education Reviewed with patient today: See Education Module. · Patient to reach out to care coordinator at [phone] or MD office with questions. · Reminded patient of walk in care clinic availability/hours; and when to utilize the facility if MD office not available. Care Coordinator Plan of Care: This nurse CC will plan patient to update this nurse CC next week on if would like sent to ENT. .                 Care Coordination Interventions    Program Enrollment:  Rising Risk  Referral from Primary Care Provider:  No  Suggested Interventions and Community Resources  Assocation for Blind:  Not Started  Diabetes Education:  Declined (Comment: Pt referred to [facility] DM ED, has not agreed to appt)  Fall Risk Prevention:  Completed  Senior Services:  Not Started  Transportation Support:  Completed  Zone Management Tools:  Completed           Prior to Admission medications    Medication Sig Start Date End Date Taking?  Authorizing Provider   meloxicam (MOBIC) 7.5 MG tablet Take 1 tablet by mouth daily as needed for Pain For arthritis take with food 6/24/19   Garett Galvez MD   montelukast (SINGULAIR) 10 MG tablet take 1 tablet by mouth once daily 6/2/19   Historical Provider, MD   loratadine (CLARITIN) 10 MG capsule Take 10 mg by mouth daily as needed    Historical Provider, MD   simvastatin (ZOCOR) 10 MG tablet take 1 tablet by mouth every evening 5/1/19   NOHELIA Artis CNP   rOPINIRole (REQUIP) 0.5 MG tablet take 1 tablet by mouth every evening if needed for RESTLESS LEG SYNDROME 4/22/19   NOHELIA Artis CNP   traZODone (DESYREL) 50 MG tablet take 1 tablet by mouth every evening 4/5/19   NOHELIA Artis CNP   fluticasone (FLONASE) 50 MCG/ACT nasal

## 2019-07-08 ENCOUNTER — TELEPHONE (OUTPATIENT)
Dept: FAMILY MEDICINE CLINIC | Age: 84
End: 2019-07-08

## 2019-07-08 RX ORDER — TRAZODONE HYDROCHLORIDE 50 MG/1
50 TABLET ORAL NIGHTLY
Qty: 30 TABLET | Refills: 2 | Status: SHIPPED | OUTPATIENT
Start: 2019-07-08 | End: 2019-10-18 | Stop reason: SDUPTHER

## 2019-07-09 ENCOUNTER — CARE COORDINATION (OUTPATIENT)
Dept: CARE COORDINATION | Age: 84
End: 2019-07-09

## 2019-07-09 DIAGNOSIS — R09.81 NASAL SINUS CONGESTION: Primary | ICD-10-CM

## 2019-07-09 NOTE — CARE COORDINATION
Reason For Call Today:  -Phone call from patient this am to request a referral to dr. Sulaiman Miranda, ENT.     -patient reports, \"the congestion will not go away, so he thinks he should go see him\". Last MD visit Reviewed:   -patient seen Trace Loges on 06/27/19 and was diagnosed with post-nasal drip, seasonal allergies. -was instructed that day if congestion did not clear up, an ENT referral may be necessary. Care Coordination Plan of Care: This nurse Care Coordinator will pend ENT referral to Jose L Ojeda on separate encounter to sign. -will forward to  to call and check on patient next week, make sure patient was able to get scheduled with ENT, follow up on patient's blood pressure at that time.   -This nurse Care Manager will plan follow up to patient in 2 weeks regarding.

## 2019-07-11 ENCOUNTER — CARE COORDINATION (OUTPATIENT)
Dept: CARE COORDINATION | Age: 84
End: 2019-07-11

## 2019-07-11 NOTE — CARE COORDINATION
Reason For Call Today:  -Phone call to patient to inform that Eddy Ferreira would like to see her first prior to referring him to the ENT. -Patient agreeable to Monday at 2pm at [facility].  -patient will need a ride. Phone call to [employer]:   -Per [employer], [facility] [place] can not transport patient on Monday due to being full\"     Phone call back to patient to inform.  -discussed opening for tomorrow, 07/12/19, but at [facility] clinic.  -patient would like to see if [facility] can transport him there instead    Phone call back to yair [facility] Araceli  -they are full for tomorrow as well. Phone call back to patient to inform:   -patient will check with his nephew to see if his nephew can bring him to [place] office tomorrow and will then call this nurse Care manager back to notify    Phone call back from patient to inform that his nephew can bring him to appointment tomorrow:   -patient will plan to be at office by 21 [phone]. Care Coordination Plan of Care: This nurse Care Coordinator will plan to follow up with patient next week. -Follow up on new orders/instructions  -Follow up on any new medications, testing.       Future Appointments   Date Time Provider Mai Gutierrez   7/12/2019 10:30 AM NOHELIA Barron CNP [facility]   7/15/2019  2:00 PM NOHELIA Barron CNP [facility]   8/12/2019  2:00 PM Liss Ohms, APRN - CNP [facility]   8/12/2019  2:00 PM [facility]  None

## 2019-07-12 ENCOUNTER — OFFICE VISIT (OUTPATIENT)
Dept: FAMILY MEDICINE CLINIC | Age: 84
End: 2019-07-12
Payer: MEDICARE

## 2019-07-12 VITALS
OXYGEN SATURATION: 98 % | SYSTOLIC BLOOD PRESSURE: 130 MMHG | TEMPERATURE: 97.8 F | BODY MASS INDEX: 27.19 KG/M2 | WEIGHT: 163.4 LBS | HEART RATE: 62 BPM | DIASTOLIC BLOOD PRESSURE: 70 MMHG

## 2019-07-12 DIAGNOSIS — J01.01 ACUTE RECURRENT MAXILLARY SINUSITIS: Primary | ICD-10-CM

## 2019-07-12 DIAGNOSIS — G25.81 RESTLESS LEGS SYNDROME (RLS): ICD-10-CM

## 2019-07-12 DIAGNOSIS — D63.8 ANEMIA OF CHRONIC DISEASE: ICD-10-CM

## 2019-07-12 DIAGNOSIS — R68.2 DRY MOUTH: ICD-10-CM

## 2019-07-12 DIAGNOSIS — R09.81 NASAL SINUS CONGESTION: ICD-10-CM

## 2019-07-12 DIAGNOSIS — N18.30 CRI (CHRONIC RENAL INSUFFICIENCY), STAGE 3 (MODERATE) (HCC): ICD-10-CM

## 2019-07-12 DIAGNOSIS — E11.9 CONTROLLED TYPE 2 DIABETES MELLITUS WITHOUT COMPLICATION, WITHOUT LONG-TERM CURRENT USE OF INSULIN (HCC): ICD-10-CM

## 2019-07-12 DIAGNOSIS — R09.82 PND (POST-NASAL DRIP): ICD-10-CM

## 2019-07-12 PROCEDURE — G8427 DOCREV CUR MEDS BY ELIG CLIN: HCPCS | Performed by: NURSE PRACTITIONER

## 2019-07-12 PROCEDURE — 4040F PNEUMOC VAC/ADMIN/RCVD: CPT | Performed by: NURSE PRACTITIONER

## 2019-07-12 PROCEDURE — G8417 CALC BMI ABV UP PARAM F/U: HCPCS | Performed by: NURSE PRACTITIONER

## 2019-07-12 PROCEDURE — 99214 OFFICE O/P EST MOD 30 MIN: CPT | Performed by: NURSE PRACTITIONER

## 2019-07-12 PROCEDURE — 1036F TOBACCO NON-USER: CPT | Performed by: NURSE PRACTITIONER

## 2019-07-12 PROCEDURE — 1123F ACP DISCUSS/DSCN MKR DOCD: CPT | Performed by: NURSE PRACTITIONER

## 2019-07-12 RX ORDER — DOXYCYCLINE HYCLATE 100 MG/1
100 CAPSULE ORAL 2 TIMES DAILY
Qty: 20 CAPSULE | Refills: 0 | Status: SHIPPED | OUTPATIENT
Start: 2019-07-12 | End: 2019-07-22

## 2019-07-12 NOTE — PROGRESS NOTES
rOPINIRole (REQUIP) 0.5 MG tablet take 1 tablet by mouth every evening if needed for RESTLESS LEG SYNDROME 30 tablet 2    fluticasone (FLONASE) 50 MCG/ACT nasal spray 1 spray by Each Nare route daily 1 Spray in each nostril 1 Bottle 5    albuterol sulfate HFA (PROVENTIL HFA) 108 (90 Base) MCG/ACT inhaler Inhale 2 puffs into the lungs every 6 hours as needed for Wheezing 3 Inhaler 3    NONFORMULARY Take 1 tablet by mouth as needed (Leg cramp relief) Indications: Leg Cramps José's leg cramp relief 50 quick-dissolving tablets, homeopathic. No current facility-administered medications for this visit. Allergies   Allergen Reactions    Aspirin Other (See Comments)     States GI upset only with more than one low dose aspirin    Codeine Itching    Penicillins        Health Maintenance   Topic Date Due    DTaP/Tdap/Td vaccine (1 - Tdap) 05/10/1953    Shingles Vaccine (1 of 2) 05/10/1984    Annual Wellness Visit (AWV)  05/10/1997    Pneumococcal 65+ years Vaccine (1 of 2 - PCV13) 05/10/1999    Diabetic retinal exam  05/10/2009    A1C test (Diabetic or Prediabetic)  08/04/2019    Flu vaccine (1) 09/01/2019    Diabetic foot exam  02/05/2020    TSH testing  04/29/2020    Potassium monitoring  04/29/2020    Creatinine monitoring  04/29/2020    Lipid screen  05/29/2020       Subjective:      Review of Systems   Constitutional: Negative for activity change, chills and fever. HENT: Positive for congestion, postnasal drip, rhinorrhea and sore throat. Negative for ear pain, trouble swallowing and voice change. Eyes: Negative. Respiratory: Positive for cough. Cardiovascular: Negative for chest pain. Gastrointestinal: Negative for abdominal pain. Genitourinary: Negative for dysuria. Musculoskeletal: Negative for neck pain. Neurological: Negative for dizziness and headaches. Psychiatric/Behavioral: The patient is not nervous/anxious.         Objective:     Physical Exam   Constitutional: Requested Prescriptions     Signed Prescriptions Disp Refills    blood glucose test strips (EXACTECH TEST) strip 300 each 3     Si each by In Vitro route 3 times daily As needed.  BIOTENE MOISTURIZING MOUTH (MOUTHKOTE) SOLN 1 Bottle 0     Sig: Take 15 mLs by mouth 3 times daily as needed (dry mouth) Swish and Spit    doxycycline hyclate (VIBRAMYCIN) 100 MG capsule 20 capsule 0     Sig: Take 1 capsule by mouth 2 times daily for 10 days Maxillary sinusitis     No follow-ups on file. Orders Placed This Encounter   Medications    blood glucose test strips (EXACTECH TEST) strip     Si each by In Vitro route 3 times daily As needed. Dispense:  300 each     Refill:  3     Please dispense strips to go with true metrix meter   Tests daily    [id number]    BIOTENE MOISTURIZING MOUTH (MOUTHKOTE) SOLN     Sig: Take 15 mLs by mouth 3 times daily as needed (dry mouth) Swish and Spit     Dispense:  1 Bottle     Refill:  0    doxycycline hyclate (VIBRAMYCIN) 100 MG capsule     Sig: Take 1 capsule by mouth 2 times daily for 10 days Maxillary sinusitis     Dispense:  20 capsule     Refill:  0     Orders Placed This Encounter   Procedures    CT SINUS WO CONTRAST     Standing Status:   Future     Standing Expiration Date:   2020     Order Specific Question:   Reason for exam:     Answer:   recurrent sinusitus with PND, > 6 weeks duration rule out infection or secondary cause    CBC Auto Differential     Standing Status:   Future     Standing Expiration Date:   2020    Iron and TIBC     Standing Status:   Future     Standing Expiration Date:   2020     Order Specific Question:   Is Patient Fasting? Answer:   no     Order Specific Question:   No of Hours?      Answer:   0    Ferritin     Standing Status:   Future     Standing Expiration Date:   2020    Vitamin B12 & Folate     Standing Status:   Future     Standing Expiration Date:   2020    Comprehensive Metabolic Panel     Standing

## 2019-07-15 ENCOUNTER — CARE COORDINATION (OUTPATIENT)
Dept: CARE COORDINATION | Age: 84
End: 2019-07-15

## 2019-07-16 ASSESSMENT — ENCOUNTER SYMPTOMS
ABDOMINAL PAIN: 0
RHINORRHEA: 1
VOICE CHANGE: 0
TROUBLE SWALLOWING: 0
SORE THROAT: 1
COUGH: 1
EYES NEGATIVE: 1

## 2019-07-18 ENCOUNTER — CARE COORDINATION (OUTPATIENT)
Dept: CARE COORDINATION | Age: 84
End: 2019-07-18

## 2019-07-18 DIAGNOSIS — G25.81 RESTLESS LEG SYNDROME: ICD-10-CM

## 2019-07-18 RX ORDER — ROPINIROLE 0.5 MG/1
TABLET, FILM COATED ORAL
Qty: 30 TABLET | Refills: 2 | Status: SHIPPED | OUTPATIENT
Start: 2019-07-18 | End: 2019-08-09 | Stop reason: SDUPTHER

## 2019-07-18 NOTE — TELEPHONE ENCOUNTER
The leg cramps are likely worse due to his ANEMIA. So it is critical for him to see nephrology to see if he is eligible for epogen injections.

## 2019-07-21 ENCOUNTER — HOSPITAL ENCOUNTER (EMERGENCY)
Age: 84
Discharge: HOME | End: 2019-07-21
Attending: FAMILY MEDICINE
Payer: MEDICARE

## 2019-07-21 ENCOUNTER — APPOINTMENT (OUTPATIENT)
Dept: GENERAL RADIOLOGY | Age: 84
End: 2019-07-21
Payer: MEDICARE

## 2019-07-21 VITALS
HEIGHT: 65 IN | RESPIRATION RATE: 19 BRPM | DIASTOLIC BLOOD PRESSURE: 60 MMHG | SYSTOLIC BLOOD PRESSURE: 169 MMHG | WEIGHT: 160 LBS | HEART RATE: 67 BPM | OXYGEN SATURATION: 99 % | TEMPERATURE: 98.4 F | BODY MASS INDEX: 26.66 KG/M2

## 2019-07-21 DIAGNOSIS — D64.9 ANEMIA, UNSPECIFIED TYPE: Primary | ICD-10-CM

## 2019-07-21 DIAGNOSIS — K43.2 INCISIONAL HERNIA, WITHOUT OBSTRUCTION OR GANGRENE: ICD-10-CM

## 2019-07-21 DIAGNOSIS — G47.00 INSOMNIA, UNSPECIFIED TYPE: ICD-10-CM

## 2019-07-21 DIAGNOSIS — Z80.0 FAMILY HISTORY OF COLON CANCER IN MOTHER: ICD-10-CM

## 2019-07-21 LAB
-: NORMAL
ABSOLUTE EOS #: 0.1 K/UL (ref 0–0.4)
ABSOLUTE IMMATURE GRANULOCYTE: ABNORMAL K/UL (ref 0–0.3)
ABSOLUTE LYMPH #: 1 K/UL (ref 1–4.8)
ABSOLUTE MONO #: 0.3 K/UL (ref 0–1)
ALBUMIN SERPL-MCNC: 3.9 G/DL (ref 3.5–5.2)
ALBUMIN/GLOBULIN RATIO: ABNORMAL (ref 1–2.5)
ALP BLD-CCNC: 71 U/L (ref 40–129)
ALT SERPL-CCNC: 22 U/L (ref 5–41)
AMORPHOUS: NORMAL
ANION GAP SERPL CALCULATED.3IONS-SCNC: 12 MMOL/L (ref 9–17)
AST SERPL-CCNC: 39 U/L
BACTERIA: NORMAL
BASOPHILS # BLD: 1 % (ref 0–2)
BASOPHILS ABSOLUTE: 0 K/UL (ref 0–0.2)
BILIRUB SERPL-MCNC: 0.34 MG/DL (ref 0.3–1.2)
BILIRUBIN URINE: NEGATIVE
BUN BLDV-MCNC: 31 MG/DL (ref 8–23)
BUN/CREAT BLD: 18 (ref 9–20)
CALCIUM SERPL-MCNC: 9.7 MG/DL (ref 8.6–10.4)
CASTS UA: NORMAL /LPF
CHLORIDE BLD-SCNC: 96 MMOL/L (ref 98–107)
CO2: 25 MMOL/L (ref 20–31)
COLOR: YELLOW
COMMENT UA: ABNORMAL
CREAT SERPL-MCNC: 1.77 MG/DL (ref 0.7–1.2)
CRYSTALS, UA: NORMAL /HPF
D-DIMER QUANTITATIVE: 0.65 MG/L FEU (ref 0–0.5)
DIFFERENTIAL TYPE: YES
EOSINOPHILS RELATIVE PERCENT: 2 % (ref 0–5)
EPITHELIAL CELLS UA: NORMAL /HPF
GFR AFRICAN AMERICAN: 45 ML/MIN
GFR NON-AFRICAN AMERICAN: 37 ML/MIN
GFR SERPL CREATININE-BSD FRML MDRD: ABNORMAL ML/MIN/{1.73_M2}
GFR SERPL CREATININE-BSD FRML MDRD: ABNORMAL ML/MIN/{1.73_M2}
GLUCOSE BLD-MCNC: 232 MG/DL (ref 70–99)
GLUCOSE URINE: ABNORMAL
HCT VFR BLD CALC: 25.5 % (ref 41–53)
HEMOGLOBIN: 8.5 G/DL (ref 13.5–17.5)
IMMATURE GRANULOCYTES: ABNORMAL %
KETONES, URINE: NEGATIVE
LEUKOCYTE ESTERASE, URINE: NEGATIVE
LIPASE: 44 U/L (ref 13–60)
LYMPHOCYTES # BLD: 25 % (ref 13–44)
MCH RBC QN AUTO: 29.5 PG (ref 26–34)
MCHC RBC AUTO-ENTMCNC: 33.3 G/DL (ref 31–37)
MCV RBC AUTO: 88.6 FL (ref 80–100)
MONOCYTES # BLD: 7 % (ref 5–9)
MUCUS: NORMAL
NITRITE, URINE: NEGATIVE
NRBC AUTOMATED: ABNORMAL PER 100 WBC
OTHER OBSERVATIONS UA: NORMAL
PDW BLD-RTO: 14.2 % (ref 12.1–15.2)
PH UA: 6 (ref 5–8)
PLATELET # BLD: 128 K/UL (ref 140–450)
PLATELET ESTIMATE: ABNORMAL
PMV BLD AUTO: ABNORMAL FL (ref 6–12)
POTASSIUM SERPL-SCNC: 4.1 MMOL/L (ref 3.7–5.3)
PROTEIN UA: ABNORMAL
RBC # BLD: 2.88 M/UL (ref 4.5–5.9)
RBC # BLD: ABNORMAL 10*6/UL
RBC UA: NORMAL /HPF (ref 0–2)
RENAL EPITHELIAL, UA: NORMAL /HPF
SEG NEUTROPHILS: 65 % (ref 39–75)
SEGMENTED NEUTROPHILS ABSOLUTE COUNT: 2.7 K/UL (ref 2.1–6.5)
SODIUM BLD-SCNC: 133 MMOL/L (ref 135–144)
SPECIFIC GRAVITY UA: 1.01 (ref 1–1.03)
TOTAL PROTEIN: 6.7 G/DL (ref 6.4–8.3)
TRICHOMONAS: NORMAL
TROPONIN INTERP: NORMAL
TROPONIN T: <0.03 NG/ML
TROPONIN, HIGH SENSITIVITY: NORMAL NG/L (ref 0–22)
TURBIDITY: CLEAR
URINE HGB: NEGATIVE
UROBILINOGEN, URINE: NORMAL
WBC # BLD: 4.1 K/UL (ref 3.5–11)
WBC # BLD: ABNORMAL 10*3/UL
WBC UA: NORMAL /HPF
YEAST: NORMAL

## 2019-07-21 PROCEDURE — 6360000002 HC RX W HCPCS: Performed by: FAMILY MEDICINE

## 2019-07-21 PROCEDURE — 99285 EMERGENCY DEPT VISIT HI MDM: CPT

## 2019-07-21 PROCEDURE — 81001 URINALYSIS AUTO W/SCOPE: CPT

## 2019-07-21 PROCEDURE — 80053 COMPREHEN METABOLIC PANEL: CPT

## 2019-07-21 PROCEDURE — 96374 THER/PROPH/DIAG INJ IV PUSH: CPT

## 2019-07-21 PROCEDURE — 93005 ELECTROCARDIOGRAM TRACING: CPT | Performed by: FAMILY MEDICINE

## 2019-07-21 PROCEDURE — 85025 COMPLETE CBC W/AUTO DIFF WBC: CPT

## 2019-07-21 PROCEDURE — 71045 X-RAY EXAM CHEST 1 VIEW: CPT

## 2019-07-21 PROCEDURE — 83690 ASSAY OF LIPASE: CPT

## 2019-07-21 PROCEDURE — 85379 FIBRIN DEGRADATION QUANT: CPT

## 2019-07-21 PROCEDURE — 84484 ASSAY OF TROPONIN QUANT: CPT

## 2019-07-21 RX ORDER — LISINOPRIL 2.5 MG/1
2.5 TABLET ORAL DAILY
Refills: 0 | COMMUNITY
Start: 2019-07-16 | End: 2019-08-31

## 2019-07-21 RX ORDER — IPRATROPIUM BROMIDE AND ALBUTEROL SULFATE 2.5; .5 MG/3ML; MG/3ML
1 SOLUTION RESPIRATORY (INHALATION) ONCE
Status: DISCONTINUED | OUTPATIENT
Start: 2019-07-21 | End: 2019-07-21

## 2019-07-21 RX ORDER — METHYLPREDNISOLONE SODIUM SUCCINATE 40 MG/ML
40 INJECTION, POWDER, LYOPHILIZED, FOR SOLUTION INTRAMUSCULAR; INTRAVENOUS ONCE
Status: COMPLETED | OUTPATIENT
Start: 2019-07-21 | End: 2019-07-21

## 2019-07-21 RX ORDER — ONDANSETRON 2 MG/ML
4 INJECTION INTRAMUSCULAR; INTRAVENOUS EVERY 30 MIN PRN
Status: DISCONTINUED | OUTPATIENT
Start: 2019-07-21 | End: 2019-07-21 | Stop reason: HOSPADM

## 2019-07-21 RX ADMIN — METHYLPREDNISOLONE SODIUM SUCCINATE 40 MG: 40 INJECTION, POWDER, FOR SOLUTION INTRAMUSCULAR; INTRAVENOUS at 15:30

## 2019-07-21 NOTE — ED PROVIDER NOTES
(MOUTHKOTE) SOLN Take 15 mLs by mouth 3 times daily as needed (dry mouth) Swish and Spit 1 Bottle 0    doxycycline hyclate (VIBRAMYCIN) 100 MG capsule Take 1 capsule by mouth 2 times daily for 10 days Maxillary sinusitis 20 capsule 0    traZODone (DESYREL) 50 MG tablet Take 1 tablet by mouth nightly 30 tablet 2    montelukast (SINGULAIR) 10 MG tablet take 1 tablet by mouth once daily  0    loratadine (CLARITIN) 10 MG capsule Take 10 mg by mouth daily as needed      simvastatin (ZOCOR) 10 MG tablet take 1 tablet by mouth every evening 90 tablet 1    fluticasone (FLONASE) 50 MCG/ACT nasal spray 1 spray by Each Nare route daily 1 Spray in each nostril 1 Bottle 5    albuterol sulfate HFA (PROVENTIL HFA) 108 (90 Base) MCG/ACT inhaler Inhale 2 puffs into the lungs every 6 hours as needed for Wheezing 3 Inhaler 3    NONFORMULARY Take 1 tablet by mouth as needed (Leg cramp relief) Indications: Leg Cramps José's leg cramp relief 50 quick-dissolving tablets, homeopathic.  blood glucose test strips (EXACTECH TEST) strip 1 each by In Vitro route 3 times daily As needed. 300 each 3       ALLERGIES    Allergies   Allergen Reactions    Aspirin Other (See Comments)     States GI upset only with more than one low dose aspirin    Codeine Itching    Penicillins        FAMILY HISTORY    Family History   Problem Relation Age of Onset    Cancer Mother         colon    Cancer Sister         colon    Cancer Other         colon    Cancer Sister         colon       SOCIAL HISTORY    Social History     Socioeconomic History    Marital status:       Spouse name: None    Number of children: None    Years of education: None    Highest education level: None   Occupational History    None   Social Needs    Financial resource strain: None    Food insecurity:     Worry: None     Inability: None    Transportation needs:     Medical: None     Non-medical: None   Tobacco Use    Smoking status: Never Smoker   

## 2019-07-22 ENCOUNTER — HOSPITAL ENCOUNTER (OUTPATIENT)
Age: 84
Discharge: HOME | End: 2019-07-22
Payer: MEDICARE

## 2019-07-22 ENCOUNTER — HOSPITAL ENCOUNTER (OUTPATIENT)
Dept: CT IMAGING | Age: 84
Discharge: HOME | End: 2019-07-24
Payer: MEDICARE

## 2019-07-22 DIAGNOSIS — N18.30 CRI (CHRONIC RENAL INSUFFICIENCY), STAGE 3 (MODERATE) (HCC): ICD-10-CM

## 2019-07-22 DIAGNOSIS — D63.8 ANEMIA OF CHRONIC DISEASE: ICD-10-CM

## 2019-07-22 DIAGNOSIS — E11.9 CONTROLLED TYPE 2 DIABETES MELLITUS WITHOUT COMPLICATION, WITHOUT LONG-TERM CURRENT USE OF INSULIN (HCC): ICD-10-CM

## 2019-07-22 DIAGNOSIS — J01.01 ACUTE RECURRENT MAXILLARY SINUSITIS: ICD-10-CM

## 2019-07-22 LAB
ABSOLUTE EOS #: 0 K/UL (ref 0–0.4)
ABSOLUTE IMMATURE GRANULOCYTE: ABNORMAL K/UL (ref 0–0.3)
ABSOLUTE LYMPH #: 0.8 K/UL (ref 1–4.8)
ABSOLUTE MONO #: 0.3 K/UL (ref 0–1)
ALBUMIN SERPL-MCNC: 4 G/DL (ref 3.5–5.2)
ALBUMIN/GLOBULIN RATIO: ABNORMAL (ref 1–2.5)
ALP BLD-CCNC: 72 U/L (ref 40–129)
ALT SERPL-CCNC: 26 U/L (ref 5–41)
ANION GAP SERPL CALCULATED.3IONS-SCNC: 15 MMOL/L (ref 9–17)
AST SERPL-CCNC: 39 U/L
BASOPHILS # BLD: 0 % (ref 0–2)
BASOPHILS ABSOLUTE: 0 K/UL (ref 0–0.2)
BILIRUB SERPL-MCNC: 0.49 MG/DL (ref 0.3–1.2)
BUN BLDV-MCNC: 39 MG/DL (ref 8–23)
BUN/CREAT BLD: 22 (ref 9–20)
CALCIUM SERPL-MCNC: 10.1 MG/DL (ref 8.6–10.4)
CHLORIDE BLD-SCNC: 96 MMOL/L (ref 98–107)
CO2: 23 MMOL/L (ref 20–31)
CREAT SERPL-MCNC: 1.76 MG/DL (ref 0.7–1.2)
DIFFERENTIAL TYPE: YES
EKG ATRIAL RATE: 66 BPM
EKG P AXIS: 52 DEGREES
EKG P-R INTERVAL: 192 MS
EKG Q-T INTERVAL: 410 MS
EKG QRS DURATION: 96 MS
EKG QTC CALCULATION (BAZETT): 429 MS
EKG R AXIS: -37 DEGREES
EKG T AXIS: 73 DEGREES
EKG VENTRICULAR RATE: 66 BPM
EOSINOPHILS RELATIVE PERCENT: 0 % (ref 0–5)
FERRITIN: 24 UG/L (ref 30–400)
FOLATE: 19.8 NG/ML
GFR AFRICAN AMERICAN: 45 ML/MIN
GFR NON-AFRICAN AMERICAN: 37 ML/MIN
GFR SERPL CREATININE-BSD FRML MDRD: ABNORMAL ML/MIN/{1.73_M2}
GFR SERPL CREATININE-BSD FRML MDRD: ABNORMAL ML/MIN/{1.73_M2}
GLUCOSE BLD-MCNC: 224 MG/DL (ref 70–99)
HCT VFR BLD CALC: 26 % (ref 41–53)
HEMOGLOBIN: 8.6 G/DL (ref 13.5–17.5)
IMMATURE GRANULOCYTES: ABNORMAL %
IRON SATURATION: 14 % (ref 20–55)
IRON: 52 UG/DL (ref 59–158)
LYMPHOCYTES # BLD: 12 % (ref 13–44)
MCH RBC QN AUTO: 29.5 PG (ref 26–34)
MCHC RBC AUTO-ENTMCNC: 33.2 G/DL (ref 31–37)
MCV RBC AUTO: 89 FL (ref 80–100)
MONOCYTES # BLD: 4 % (ref 5–9)
NRBC AUTOMATED: ABNORMAL PER 100 WBC
PDW BLD-RTO: 14.1 % (ref 12.1–15.2)
PLATELET # BLD: 129 K/UL (ref 140–450)
PLATELET ESTIMATE: ABNORMAL
PMV BLD AUTO: ABNORMAL FL (ref 6–12)
POTASSIUM SERPL-SCNC: 4.4 MMOL/L (ref 3.7–5.3)
RBC # BLD: 2.92 M/UL (ref 4.5–5.9)
RBC # BLD: ABNORMAL 10*6/UL
SEG NEUTROPHILS: 84 % (ref 39–75)
SEGMENTED NEUTROPHILS ABSOLUTE COUNT: 5.9 K/UL (ref 2.1–6.5)
SODIUM BLD-SCNC: 134 MMOL/L (ref 135–144)
TOTAL IRON BINDING CAPACITY: 362 UG/DL (ref 250–450)
TOTAL PROTEIN: 6.8 G/DL (ref 6.4–8.3)
UNSATURATED IRON BINDING CAPACITY: 310 UG/DL (ref 112–347)
VITAMIN B-12: 618 PG/ML (ref 232–1245)
WBC # BLD: 7.1 K/UL (ref 3.5–11)
WBC # BLD: ABNORMAL 10*3/UL

## 2019-07-22 PROCEDURE — 80053 COMPREHEN METABOLIC PANEL: CPT

## 2019-07-22 PROCEDURE — 83540 ASSAY OF IRON: CPT

## 2019-07-22 PROCEDURE — 82728 ASSAY OF FERRITIN: CPT

## 2019-07-22 PROCEDURE — 83550 IRON BINDING TEST: CPT

## 2019-07-22 PROCEDURE — 82607 VITAMIN B-12: CPT

## 2019-07-22 PROCEDURE — 70486 CT MAXILLOFACIAL W/O DYE: CPT

## 2019-07-22 PROCEDURE — 82746 ASSAY OF FOLIC ACID SERUM: CPT

## 2019-07-22 PROCEDURE — 85025 COMPLETE CBC W/AUTO DIFF WBC: CPT

## 2019-07-22 PROCEDURE — 36415 COLL VENOUS BLD VENIPUNCTURE: CPT

## 2019-07-25 ENCOUNTER — CARE COORDINATION (OUTPATIENT)
Dept: CARE COORDINATION | Age: 84
End: 2019-07-25

## 2019-07-25 NOTE — CARE COORDINATION
daily 7/16/19   Historical Provider, MD   rOPINIRole (REQUIP) 0.5 MG tablet take 1 tablet by mouth every evening if needed for RESTLESS LEG SYNDROME 7/18/19   NOHELIA Dumont CNP   blood glucose test strips (EXACTECH TEST) strip 1 each by In Vitro route 3 times daily As needed. 7/12/19   NOHELIA Dumont CNP   BIOTENE MOISTURIZING MOUTH (MOUTHKOTE) SOLN Take 15 mLs by mouth 3 times daily as needed (dry mouth) Swish and Spit 7/12/19   NOHELIA Dumont CNP   traZODone (DESYREL) 50 MG tablet Take 1 tablet by mouth nightly 7/8/19   NOHELIA Dumont CNP   montelukast (SINGULAIR) 10 MG tablet take 1 tablet by mouth once daily 6/2/19   Historical Provider, MD   loratadine (CLARITIN) 10 MG capsule Take 10 mg by mouth daily as needed    Historical Provider, MD   simvastatin (ZOCOR) 10 MG tablet take 1 tablet by mouth every evening 5/1/19   NOHELIA Dumont CNP   fluticasone (FLONASE) 50 MCG/ACT nasal spray 1 spray by Each Nare route daily 1 Spray in each nostril 3/11/19   NOHELIA Jamison CNP   albuterol sulfate HFA (PROVENTIL HFA) 108 (90 Base) MCG/ACT inhaler Inhale 2 puffs into the lungs every 6 hours as needed for Wheezing 3/6/19   NOHELIA Dumont CNP   NONFORMULARY Take 1 tablet by mouth as needed (Leg cramp relief) Indications: Leg Cramps José's leg cramp relief 50 quick-dissolving tablets, homeopathic.     Historical Provider, MD

## 2019-07-29 ENCOUNTER — OFFICE VISIT (OUTPATIENT)
Dept: FAMILY MEDICINE CLINIC | Age: 84
End: 2019-07-29
Payer: MEDICARE

## 2019-07-29 VITALS
OXYGEN SATURATION: 98 % | WEIGHT: 165.2 LBS | TEMPERATURE: 98.6 F | BODY MASS INDEX: 27.49 KG/M2 | DIASTOLIC BLOOD PRESSURE: 60 MMHG | SYSTOLIC BLOOD PRESSURE: 122 MMHG | HEART RATE: 67 BPM

## 2019-07-29 DIAGNOSIS — N18.30 CRI (CHRONIC RENAL INSUFFICIENCY), STAGE 3 (MODERATE) (HCC): ICD-10-CM

## 2019-07-29 DIAGNOSIS — K43.2 INCISIONAL HERNIA, WITHOUT OBSTRUCTION OR GANGRENE: ICD-10-CM

## 2019-07-29 DIAGNOSIS — Z80.0 FAMILY HISTORY OF COLON CANCER IN MOTHER: ICD-10-CM

## 2019-07-29 DIAGNOSIS — G25.81 RESTLESS LEG SYNDROME: ICD-10-CM

## 2019-07-29 DIAGNOSIS — D64.9 ANEMIA, UNSPECIFIED TYPE: Primary | ICD-10-CM

## 2019-07-29 DIAGNOSIS — E11.9 CONTROLLED TYPE 2 DIABETES MELLITUS WITHOUT COMPLICATION, WITHOUT LONG-TERM CURRENT USE OF INSULIN (HCC): ICD-10-CM

## 2019-07-29 PROCEDURE — 4040F PNEUMOC VAC/ADMIN/RCVD: CPT | Performed by: NURSE PRACTITIONER

## 2019-07-29 PROCEDURE — G8417 CALC BMI ABV UP PARAM F/U: HCPCS | Performed by: NURSE PRACTITIONER

## 2019-07-29 PROCEDURE — G8427 DOCREV CUR MEDS BY ELIG CLIN: HCPCS | Performed by: NURSE PRACTITIONER

## 2019-07-29 PROCEDURE — 1123F ACP DISCUSS/DSCN MKR DOCD: CPT | Performed by: NURSE PRACTITIONER

## 2019-07-29 PROCEDURE — 99214 OFFICE O/P EST MOD 30 MIN: CPT | Performed by: NURSE PRACTITIONER

## 2019-07-29 PROCEDURE — 1036F TOBACCO NON-USER: CPT | Performed by: NURSE PRACTITIONER

## 2019-07-29 RX ORDER — FERROUS SULFATE 325(65) MG
325 TABLET ORAL
Qty: 90 TABLET | Refills: 0 | Status: ON HOLD | OUTPATIENT
Start: 2019-07-29 | End: 2020-01-01

## 2019-07-29 ASSESSMENT — ENCOUNTER SYMPTOMS
EYES NEGATIVE: 1
DIARRHEA: 0
ABDOMINAL DISTENTION: 1
RHINORRHEA: 0
ABDOMINAL PAIN: 1
SHORTNESS OF BREATH: 0
SORE THROAT: 0
CHEST TIGHTNESS: 0

## 2019-07-31 ENCOUNTER — CARE COORDINATION (OUTPATIENT)
Dept: CARE COORDINATION | Age: 84
End: 2019-07-31

## 2019-07-31 NOTE — CARE COORDINATION
Ambulatory Care Coordination Note  7/31/2019  CM Risk Score: 8  Charlson 10 Year Mortality Risk Score: 100%     ACC: Kaelyn Spence, RN    Summary Note:   Reason For Call Today:  -phone call attempt today to patient to review PCP instructions: patient to get repeat CBC Thursday (wating to make sure patient is aware.  -patient is scheduled with Dr. Guru Geronimo but not until 08/08    PCP instructions:   Assessment & Plan         1. Anemia, unspecified type  Worsening anemia must rule out risk GI BLeed. On protonix  Decrease in hgb. Recent antibiotics for recurrent sinusitis. Iron stores low and start 1 pill daily will need for 3 months.      Kidney function is stable but has anemia of chronic disease        2. Restless leg syndrome  Worse with anemia decline, on requip     3. Family history of colon cancer in mother  Last scope 2006 likely needs repeat hx diverticulosis     4. Incisional hernia, without obstruction or gangrene     5. Controlled type 2 diabetes mellitus without complication, without long-term current use of insulin (HCC)  Stable        6. CRI (chronic renal insufficiency), stage 3 (moderate) (HCC)        Refer to DR. Trevon Gardner for further eval of anemia. appt with Dr. Alexandra Reynoso due to renal risk also of causing decline.      In unable to get appt this week do labs on Thursday.   [address] of Care:    This nurse Care Coordinator will attempt to reach patient back tomorrow, wanting to follow up on labs due tomorrow.  -follow up on Dr. Alexandra Reynoso appointment      Care Coordination Interventions    Program Enrollment:  Rising Risk  Referral from Primary Care Provider:  No  Suggested Interventions and Community Resources  Assocation for Blind:  Not Started  Diabetes Education:  Declined (Comment: Pt referred to [facility] ED, has not agreed to appt)  Fall Risk Prevention:  Completed  Senior Services:  Not Started  Transportation Support:  Completed  Zone Management Tools:  Completed

## 2019-08-01 ENCOUNTER — CARE COORDINATION (OUTPATIENT)
Dept: CARE COORDINATION | Age: 84
End: 2019-08-01

## 2019-08-02 ENCOUNTER — HOSPITAL ENCOUNTER (OUTPATIENT)
Age: 84
Discharge: HOME | End: 2019-08-02
Payer: MEDICARE

## 2019-08-02 ENCOUNTER — CARE COORDINATION (OUTPATIENT)
Dept: CARE COORDINATION | Age: 84
End: 2019-08-02

## 2019-08-02 DIAGNOSIS — D64.9 ANEMIA, UNSPECIFIED TYPE: ICD-10-CM

## 2019-08-02 LAB
ABSOLUTE EOS #: 0.04 K/UL (ref 0–0.4)
ABSOLUTE IMMATURE GRANULOCYTE: ABNORMAL K/UL (ref 0–0.3)
ABSOLUTE LYMPH #: 1.37 K/UL (ref 1–4.8)
ABSOLUTE MONO #: 0.34 K/UL (ref 0–1)
BASOPHILS # BLD: 1 % (ref 0–2)
BASOPHILS ABSOLUTE: 0.04 K/UL (ref 0–0.2)
DIFFERENTIAL TYPE: ABNORMAL
EOSINOPHILS RELATIVE PERCENT: 1 % (ref 0–5)
HCT VFR BLD CALC: 24.1 % (ref 41–53)
HEMOGLOBIN: 8.1 G/DL (ref 13.5–17.5)
IMMATURE GRANULOCYTES: ABNORMAL %
LYMPHOCYTES # BLD: 36 % (ref 13–44)
MCH RBC QN AUTO: 29.4 PG (ref 26–34)
MCHC RBC AUTO-ENTMCNC: 33.6 G/DL (ref 31–37)
MCV RBC AUTO: 87.5 FL (ref 80–100)
MONOCYTES # BLD: 9 % (ref 5–9)
MORPHOLOGY: ABNORMAL
NRBC AUTOMATED: ABNORMAL PER 100 WBC
PDW BLD-RTO: 13.9 % (ref 12.1–15.2)
PLATELET # BLD: 139 K/UL (ref 140–450)
PLATELET ESTIMATE: ABNORMAL
PMV BLD AUTO: ABNORMAL FL (ref 6–12)
RBC # BLD: 2.75 M/UL (ref 4.5–5.9)
RBC # BLD: ABNORMAL 10*6/UL
SEG NEUTROPHILS: 53 % (ref 39–75)
SEGMENTED NEUTROPHILS ABSOLUTE COUNT: 2.01 K/UL (ref 2.1–6.5)
WBC # BLD: 3.8 K/UL (ref 3.5–11)
WBC # BLD: ABNORMAL 10*3/UL

## 2019-08-02 PROCEDURE — 36415 COLL VENOUS BLD VENIPUNCTURE: CPT

## 2019-08-02 PROCEDURE — 85025 COMPLETE CBC W/AUTO DIFF WBC: CPT

## 2019-08-06 RX ORDER — SUCRALFATE 1 G/1
1 TABLET ORAL
Qty: 60 TABLET | Refills: 0 | Status: SHIPPED | OUTPATIENT
Start: 2019-08-06 | End: 2019-08-31

## 2019-08-06 RX ORDER — PANTOPRAZOLE SODIUM 40 MG/1
40 TABLET, DELAYED RELEASE ORAL DAILY
Qty: 90 TABLET | Refills: 1 | Status: SHIPPED | OUTPATIENT
Start: 2019-08-06 | End: 2020-01-01 | Stop reason: ALTCHOICE

## 2019-08-08 ENCOUNTER — OFFICE VISIT (OUTPATIENT)
Dept: SURGERY | Age: 84
End: 2019-08-08
Payer: MEDICARE

## 2019-08-08 VITALS
DIASTOLIC BLOOD PRESSURE: 73 MMHG | RESPIRATION RATE: 18 BRPM | SYSTOLIC BLOOD PRESSURE: 135 MMHG | WEIGHT: 167 LBS | BODY MASS INDEX: 27.79 KG/M2 | HEART RATE: 64 BPM

## 2019-08-08 DIAGNOSIS — Z80.0 FAMILY HISTORY OF COLON CANCER: ICD-10-CM

## 2019-08-08 DIAGNOSIS — K57.90 DIVERTICULOSIS: ICD-10-CM

## 2019-08-08 DIAGNOSIS — K43.9 VENTRAL HERNIA WITHOUT OBSTRUCTION OR GANGRENE: ICD-10-CM

## 2019-08-08 DIAGNOSIS — K59.00 CONSTIPATION, UNSPECIFIED CONSTIPATION TYPE: ICD-10-CM

## 2019-08-08 DIAGNOSIS — D64.9 ANEMIA, UNSPECIFIED TYPE: Primary | ICD-10-CM

## 2019-08-08 DIAGNOSIS — Z12.11 ENCOUNTER FOR SCREENING COLONOSCOPY: ICD-10-CM

## 2019-08-08 PROCEDURE — 1123F ACP DISCUSS/DSCN MKR DOCD: CPT | Performed by: SURGERY

## 2019-08-08 PROCEDURE — 4040F PNEUMOC VAC/ADMIN/RCVD: CPT | Performed by: SURGERY

## 2019-08-08 PROCEDURE — 99214 OFFICE O/P EST MOD 30 MIN: CPT | Performed by: SURGERY

## 2019-08-08 PROCEDURE — 1036F TOBACCO NON-USER: CPT | Performed by: SURGERY

## 2019-08-08 PROCEDURE — G8417 CALC BMI ABV UP PARAM F/U: HCPCS | Performed by: SURGERY

## 2019-08-08 PROCEDURE — G8427 DOCREV CUR MEDS BY ELIG CLIN: HCPCS | Performed by: SURGERY

## 2019-08-08 ASSESSMENT — ENCOUNTER SYMPTOMS
SORE THROAT: 0
SHORTNESS OF BREATH: 0
TROUBLE SWALLOWING: 0
ABDOMINAL PAIN: 0
CHOKING: 0
VOMITING: 0
NAUSEA: 0
BLOOD IN STOOL: 0
BACK PAIN: 0
COUGH: 0

## 2019-08-08 NOTE — PROGRESS NOTES
On From To Message Type    10/19/2018  3:27 PM Jj, [facility] Incoming Radiant Results From [place] [address] Terrell Reno MD Results   Radiation Dose Estimates     No radiation information found for this patient   Narrative   CT ABDOMEN PELVIS W IV CONTRAST       CLINICAL STATEMENT: K43.2, incisional hernias without obstruction or gangrene.       COMPARISON: CT abdomen and pelvis 8/1/2017.            TECHNIQUE: CT examination of the abdomen and pelvis following the    administration of Isovue-370, 75 mL intravenous contrast. Coronal and sagittal    reformations were performed. Oral contrast was also administered.       Dose reduction techniques were achieved by using automated exposure control    and/or adjustment of mA and/or kV according to patient size and/or use of    iterative reconstruction technique.           FINDINGS: 2 abdominal ventral hernias are again identified around the level of    the umbilicus. The more proximal shows the fascial defect in the midline and    measures 7.1 cm transverse with the hernia measuring 8.8 cm in diameter,    containing unobstructed small bowel loops with contrast.       The slightly more inferior and midline/left-sided hernia measures 3 cm    longitudinal with the fascial defect measuring 3.9 cm and the hernia sac    measuring 5.4 cm transverse and containing opacified small bowel loops, without    obstruction.       There is severe diverticulosis in the descending and sigmoid colon without    diverticulitis. Small amount of fluid in the cul-de-sac. Minimally enlarged    prostate. Bladder contour normal.       Moderate plaque in the aorta without aneurysm or retroperitoneal adenopathy. No    suspicious mass or hydronephrosis in the kidneys. Normal adrenal glands,    spleen, and pancreas except for diffuse mild pancreatic atrophy.       Previous cholecystectomy. Small posterior right lobe liver unchanged. Coronary    artery calcifications. Lung bases clear. No free air.  Bone windows show mild    degenerative changes, not unusual for age.               Impression       1. Ventral hernias containing nonobstructed opacified small bowel loops as    described above.       2. Severe diverticulosis distal descending and sigmoid colon.       3. Cholecystectomy.       4. Coronary artery calcifications. Assessment:       Diagnosis Orders   1. Anemia, unspecified type     2. Constipation, unspecified constipation type     3. Diverticulosis     4. Ventral hernia without obstruction or gangrene     5. Family history of colon cancer     6. Encounter for screening colonoscopy  Cologuard (For External Results Only)         Plan: Will request Cologuard. .. If positive, plan colonoscopy in addition to EGD. Continue supplemental iron and Protonix. Follow up with me in 2 weeks to schedule endoscopy.         Avelino Etienne MD

## 2019-08-08 NOTE — PATIENT INSTRUCTIONS
doctor will tell you which medicines to take or stop before your procedure. You may need to stop taking certain medicines a week or more before the procedure. So talk to your doctor as soon as you can.     · If you have an advance directive, let your doctor know. It may include a living will and a durable power of  for health care. Bring a copy to the hospital. If you don't have one, you may want to prepare one. It lets your doctor and loved ones know your health care wishes. Doctors advise that everyone prepare these papers before any type of surgery or procedure. Procedures can be stressful. This information will help you understand what you can expect. And it will help you safely prepare for your procedure. What happens on the day of the procedure? · Follow the instructions exactly about when to stop eating and drinking. If you don't, your procedure may be canceled. If your doctor told you to take your medicines on the day of the procedure, take them with only a sip of water.     · Take a bath or shower before you come in for your procedure. Do not apply lotions, perfumes, deodorants, or nail polish.     · Take off all jewelry and piercings. And take out contact lenses, if you wear them.    At the hospital or surgery center   · Bring a picture ID.     · The test may take 15 to 30 minutes.     · The doctor may spray medicine on the back of your throat to numb it. You also will get medicine to prevent pain and to relax you.     · You will lie on your left side. The doctor will put the scope in your mouth and toward the back of your throat. The doctor will tell you when to swallow. This helps the scope move down your throat. You will be able to breathe normally. The doctor will move the scope down your esophagus into your stomach.  The doctor also may look at the duodenum.     · If your doctor wants to take a sample of tissue for a biopsy, he or she may use small surgical tools, which are put into the need a ride. Your doctor will tell you when you can eat and do your usual activities. Your doctor will talk to you about when you will need your next colonoscopy. The results of your test and your risk for colorectal cancer will help your doctor decide how often you need to be checked. Follow-up care is a key part of your treatment and safety. Be sure to make and go to all appointments, and call your doctor if you are having problems. It's also a good idea to know your test results and keep a list of the medicines you take. Where can you learn more? Go to https://Vive [employer] That Talk.Pixlee. org and sign in to your [employer] account. Enter M708 in the [employer] box to learn more about \"Learning About Colonoscopy. \"     If you do not have an account, please click on the \"Sign Up Now\" link. Current as of: December 19, 2018  Content Version: 12.1  © 3705-7574 Healthwise, Incorporated. Care instructions adapted under license by [facility] [place] ([facility]). If you have questions about a medical condition or this instruction, always ask your healthcare professional. [place] [address] any warranty or liability for your use of this information.

## 2019-08-09 DIAGNOSIS — G25.81 RESTLESS LEG SYNDROME: ICD-10-CM

## 2019-08-09 RX ORDER — ROPINIROLE 0.5 MG/1
0.5 TABLET, FILM COATED ORAL 2 TIMES DAILY
Qty: 180 TABLET | Refills: 1 | Status: SHIPPED | OUTPATIENT
Start: 2019-08-09 | End: 2020-01-01

## 2019-08-09 NOTE — TELEPHONE ENCOUNTER
Patient requesting refill due to dose increase. Health Maintenance   Topic Date Due    DTaP/Tdap/Td vaccine (1 - Tdap) 05/10/1953    Shingles Vaccine (1 of 2) 05/10/1984    Annual Wellness Visit (AWV)  05/10/1997    Pneumococcal 65+ years Vaccine (1 of 2 - PCV13) 05/10/1999    Diabetic retinal exam  05/10/2009    A1C test (Diabetic or Prediabetic)  08/04/2019    Flu vaccine (1) 09/01/2019    Diabetic foot exam  02/05/2020    TSH testing  04/29/2020    Lipid screen  05/29/2020    Potassium monitoring  07/22/2020    Creatinine monitoring  07/22/2020             (applicable per patient's age: Cancer Screenings, Depression Screening, Fall Risk Screening, Immunizations)    Hemoglobin A1C (%)   Date Value   02/04/2019 8.1   04/03/2018 7.4 (H)   03/12/2018 7.9     Microalb/Crt.  Ratio (mcg/mg creat)   Date Value   02/04/2019 65 (H)     LDL Cholesterol (mg/dL)   Date Value   05/29/2019 48     AST (U/L)   Date Value   07/22/2019 39     ALT (U/L)   Date Value   07/22/2019 26     BUN (mg/dL)   Date Value   07/22/2019 39 (H)      (goal A1C is < 7)   (goal LDL is <100) need 30-50% reduction from baseline     BP Readings from Last 3 Encounters:   08/08/19 135/73   07/29/19 122/60   07/21/19 (!) 169/60    (goal /80)      All Future Testing planned in [facility]:  Lab Frequency Next Occurrence   (Gxt) Stress Test Exercise W Out Myoview Once 05/07/2019   Cologuard (For External Results Only) Once 08/08/2019       Next Visit Date:  Future Appointments   Date Time Provider Mai Gutierrez   10/2/2019 10:40  [address]  None            Patient Active Problem List:     Essential hypertension, benign     COPD (chronic obstructive pulmonary disease) ([address]     Pure hypercholesterolemia     Esophageal reflux     Diverticulosis     Restless leg syndrome     Blind right eye     CKD stage 3 due to type 2 diabetes mellitus ([address]     Acquired hypothyroidism     Sacroiliitis (HCC)

## 2019-08-22 ENCOUNTER — CARE COORDINATION (OUTPATIENT)
Dept: CARE COORDINATION | Age: 84
End: 2019-08-22

## 2019-08-22 DIAGNOSIS — E11.9 CONTROLLED TYPE 2 DIABETES MELLITUS WITHOUT COMPLICATION, WITHOUT LONG-TERM CURRENT USE OF INSULIN (HCC): Primary | ICD-10-CM

## 2019-08-22 NOTE — CARE COORDINATION
tablet by mouth once daily 6/2/19   Historical Provider, MD   loratadine (CLARITIN) 10 MG capsule Take 10 mg by mouth daily as needed    Historical Provider, MD   simvastatin (ZOCOR) 10 MG tablet take 1 tablet by mouth every evening 5/1/19   NOHELIA Dumont CNP   albuterol sulfate HFA (PROVENTIL HFA) 108 (90 Base) MCG/ACT inhaler Inhale 2 puffs into the lungs every 6 hours as needed for Wheezing 3/6/19   NOHELIA Dumont CNP   NONFORMULARY Take 1 tablet by mouth as needed (Leg cramp relief) Indications: Leg Cramps José's leg cramp relief 50 quick-dissolving tablets, homeopathic.     Historical Provider, MD       Future Appointments   Date Time Provider Mai Gutierrez   10/2/2019 10:40 AM [address] Manuel Mcallister

## 2019-08-26 ENCOUNTER — HOSPITAL ENCOUNTER (OUTPATIENT)
Age: 84
Discharge: HOME | End: 2019-08-28
Payer: MEDICARE

## 2019-08-26 ENCOUNTER — OFFICE VISIT (OUTPATIENT)
Dept: FAMILY MEDICINE CLINIC | Age: 84
End: 2019-08-26
Payer: MEDICARE

## 2019-08-26 ENCOUNTER — HOSPITAL ENCOUNTER (OUTPATIENT)
Age: 84
Discharge: HOME | End: 2019-08-26
Payer: MEDICARE

## 2019-08-26 ENCOUNTER — HOSPITAL ENCOUNTER (OUTPATIENT)
Dept: GENERAL RADIOLOGY | Age: 84
Discharge: HOME | End: 2019-08-28
Payer: MEDICARE

## 2019-08-26 ENCOUNTER — HOSPITAL ENCOUNTER (OUTPATIENT)
Dept: CT IMAGING | Age: 84
Discharge: HOME | End: 2019-08-28
Payer: MEDICARE

## 2019-08-26 VITALS
SYSTOLIC BLOOD PRESSURE: 126 MMHG | TEMPERATURE: 100 F | DIASTOLIC BLOOD PRESSURE: 66 MMHG | BODY MASS INDEX: 27.79 KG/M2 | WEIGHT: 167 LBS | OXYGEN SATURATION: 94 % | HEART RATE: 77 BPM

## 2019-08-26 DIAGNOSIS — R11.0 NAUSEA: ICD-10-CM

## 2019-08-26 DIAGNOSIS — Z12.5 SCREENING FOR PROSTATE CANCER: ICD-10-CM

## 2019-08-26 DIAGNOSIS — R50.9 FEVER, UNSPECIFIED FEVER CAUSE: ICD-10-CM

## 2019-08-26 DIAGNOSIS — M46.1 SACROILIITIS (HCC): ICD-10-CM

## 2019-08-26 DIAGNOSIS — I10 ESSENTIAL HYPERTENSION, BENIGN: ICD-10-CM

## 2019-08-26 DIAGNOSIS — Z91.81 AT HIGH RISK FOR FALLS: ICD-10-CM

## 2019-08-26 DIAGNOSIS — N18.30 CKD STAGE 3 DUE TO TYPE 2 DIABETES MELLITUS (HCC): ICD-10-CM

## 2019-08-26 DIAGNOSIS — M54.50 ACUTE RIGHT-SIDED LOW BACK PAIN WITHOUT SCIATICA: ICD-10-CM

## 2019-08-26 DIAGNOSIS — M54.50 ACUTE RIGHT-SIDED LOW BACK PAIN WITHOUT SCIATICA: Primary | ICD-10-CM

## 2019-08-26 DIAGNOSIS — E11.22 CKD STAGE 3 DUE TO TYPE 2 DIABETES MELLITUS (HCC): ICD-10-CM

## 2019-08-26 LAB
-: NORMAL
ABSOLUTE EOS #: 0 K/UL (ref 0–0.4)
ABSOLUTE IMMATURE GRANULOCYTE: ABNORMAL K/UL (ref 0–0.3)
ABSOLUTE LYMPH #: 0.5 K/UL (ref 1–4.8)
ABSOLUTE MONO #: 0.3 K/UL (ref 0–1)
ALBUMIN SERPL-MCNC: 4 G/DL (ref 3.5–5.2)
ALBUMIN/GLOBULIN RATIO: ABNORMAL (ref 1–2.5)
ALP BLD-CCNC: 84 U/L (ref 40–129)
ALT SERPL-CCNC: 19 U/L (ref 5–41)
AMORPHOUS: NORMAL
ANION GAP SERPL CALCULATED.3IONS-SCNC: 12 MMOL/L (ref 9–17)
AST SERPL-CCNC: 35 U/L
BACTERIA: NORMAL
BASOPHILS # BLD: 0 % (ref 0–2)
BASOPHILS ABSOLUTE: 0 K/UL (ref 0–0.2)
BILIRUB SERPL-MCNC: 0.76 MG/DL (ref 0.3–1.2)
BILIRUBIN URINE: NEGATIVE
BUN BLDV-MCNC: 22 MG/DL (ref 8–23)
BUN/CREAT BLD: 13 (ref 9–20)
CALCIUM SERPL-MCNC: 9.8 MG/DL (ref 8.6–10.4)
CASTS UA: NORMAL /LPF
CHLORIDE BLD-SCNC: 100 MMOL/L (ref 98–107)
CO2: 24 MMOL/L (ref 20–31)
COLOR: YELLOW
COMMENT UA: ABNORMAL
CREAT SERPL-MCNC: 1.74 MG/DL (ref 0.7–1.2)
CRYSTALS, UA: NORMAL /HPF
DIFFERENTIAL TYPE: YES
EOSINOPHILS RELATIVE PERCENT: 0 % (ref 0–5)
EPITHELIAL CELLS UA: NORMAL /HPF
GFR AFRICAN AMERICAN: 45 ML/MIN
GFR NON-AFRICAN AMERICAN: 37 ML/MIN
GFR SERPL CREATININE-BSD FRML MDRD: ABNORMAL ML/MIN/{1.73_M2}
GFR SERPL CREATININE-BSD FRML MDRD: ABNORMAL ML/MIN/{1.73_M2}
GLUCOSE BLD-MCNC: 191 MG/DL (ref 70–99)
GLUCOSE URINE: NEGATIVE
HCT VFR BLD CALC: 27.6 % (ref 41–53)
HEMOGLOBIN: 9.2 G/DL (ref 13.5–17.5)
IMMATURE GRANULOCYTES: ABNORMAL %
KETONES, URINE: NEGATIVE
LEUKOCYTE ESTERASE, URINE: NEGATIVE
LYMPHOCYTES # BLD: 9 % (ref 13–44)
MCH RBC QN AUTO: 30.4 PG (ref 26–34)
MCHC RBC AUTO-ENTMCNC: 33.5 G/DL (ref 31–37)
MCV RBC AUTO: 90.8 FL (ref 80–100)
MONOCYTES # BLD: 6 % (ref 5–9)
MUCUS: NORMAL
NITRITE, URINE: NEGATIVE
NRBC AUTOMATED: ABNORMAL PER 100 WBC
OTHER OBSERVATIONS UA: NORMAL
PDW BLD-RTO: 17 % (ref 12.1–15.2)
PH UA: 6 (ref 5–8)
PLATELET # BLD: 148 K/UL (ref 140–450)
PLATELET ESTIMATE: ABNORMAL
PMV BLD AUTO: ABNORMAL FL (ref 6–12)
POTASSIUM SERPL-SCNC: 3.8 MMOL/L (ref 3.7–5.3)
PROSTATE SPECIFIC ANTIGEN: 0.23 UG/L
PROTEIN UA: ABNORMAL
RBC # BLD: 3.04 M/UL (ref 4.5–5.9)
RBC # BLD: ABNORMAL 10*6/UL
RBC UA: NORMAL /HPF (ref 0–2)
RENAL EPITHELIAL, UA: NORMAL /HPF
SEG NEUTROPHILS: 85 % (ref 39–75)
SEGMENTED NEUTROPHILS ABSOLUTE COUNT: 4.5 K/UL (ref 2.1–6.5)
SODIUM BLD-SCNC: 136 MMOL/L (ref 135–144)
SPECIFIC GRAVITY UA: 1.01 (ref 1–1.03)
TOTAL PROTEIN: 7.1 G/DL (ref 6.4–8.3)
TRICHOMONAS: NORMAL
TURBIDITY: CLEAR
URINE HGB: ABNORMAL
UROBILINOGEN, URINE: NORMAL
WBC # BLD: 5.3 K/UL (ref 3.5–11)
WBC # BLD: ABNORMAL 10*3/UL
WBC UA: NORMAL /HPF
YEAST: NORMAL

## 2019-08-26 PROCEDURE — 36415 COLL VENOUS BLD VENIPUNCTURE: CPT

## 2019-08-26 PROCEDURE — G8427 DOCREV CUR MEDS BY ELIG CLIN: HCPCS | Performed by: NURSE PRACTITIONER

## 2019-08-26 PROCEDURE — G0103 PSA SCREENING: HCPCS

## 2019-08-26 PROCEDURE — 81001 URINALYSIS AUTO W/SCOPE: CPT

## 2019-08-26 PROCEDURE — 4040F PNEUMOC VAC/ADMIN/RCVD: CPT | Performed by: NURSE PRACTITIONER

## 2019-08-26 PROCEDURE — 80053 COMPREHEN METABOLIC PANEL: CPT

## 2019-08-26 PROCEDURE — 93005 ELECTROCARDIOGRAM TRACING: CPT

## 2019-08-26 PROCEDURE — 85025 COMPLETE CBC W/AUTO DIFF WBC: CPT

## 2019-08-26 PROCEDURE — 1036F TOBACCO NON-USER: CPT | Performed by: NURSE PRACTITIONER

## 2019-08-26 PROCEDURE — 1123F ACP DISCUSS/DSCN MKR DOCD: CPT | Performed by: NURSE PRACTITIONER

## 2019-08-26 PROCEDURE — 71046 X-RAY EXAM CHEST 2 VIEWS: CPT

## 2019-08-26 PROCEDURE — 99214 OFFICE O/P EST MOD 30 MIN: CPT | Performed by: NURSE PRACTITIONER

## 2019-08-26 PROCEDURE — G8417 CALC BMI ABV UP PARAM F/U: HCPCS | Performed by: NURSE PRACTITIONER

## 2019-08-26 PROCEDURE — 72131 CT LUMBAR SPINE W/O DYE: CPT

## 2019-08-26 NOTE — PROGRESS NOTES
Status:   Future     Number of Occurrences:   1     Standing Expiration Date:   8/25/2020     Order Specific Question:   Reason for exam:     Answer:   fever, cough production, nausea    CT LUMBAR SPINE WO CONTRAST     Standing Status:   Future     Number of Occurrences:   1     Standing Expiration Date:   8/26/2020     Order Specific Question:   Reason for exam:     Answer:   severe pain right low back SI joint and flank since Saturday evening constant rule out fx , hx diverticulosis/large hernia.  Psa screening     Standing Status:   Future     Number of Occurrences:   1     Standing Expiration Date:   8/26/2020    Urinalysis Reflex to Culture     Standing Status:   Future     Number of Occurrences:   1     Standing Expiration Date:   8/26/2020     Order Specific Question:   SPECIFY(EX-CATH,MIDSTREAM,CYSTO,ETC)? Answer:   cc    CBC Auto Differential     Standing Status:   Future     Number of Occurrences:   1     Standing Expiration Date:   8/26/2020    Comprehensive Metabolic Panel     Standing Status:   Future     Number of Occurrences:   1     Standing Expiration Date:   8/26/2020    EKG 12 Lead     Standing Status:   Future     Number of Occurrences:   1     Standing Expiration Date:   8/26/2020     Order Specific Question:   Reason for Exam?     Answer:   Nausea         Clif received counseling on the following healthy behaviors: nutrition, exercise and medication adherence  Reviewed prior labs and health maintenance. Continue current medications, diet and exercise. Discussed use, benefit, and side effects of prescribed medications. Barriers to medication compliance addressed. Patient given educational materials - see patient instructions. All patient questions answered. Patient voiced understanding.           Electronically signed by NOHELIA Maravilla CNP on 8/30/2019 at 3:21 PM            On the basis of positive falls risk screening, assessment and plan is as follows: home safety tips provided.

## 2019-08-26 NOTE — PATIENT INSTRUCTIONS
and slowly pull back on the towel. You should feel a gentle stretch down the back of your leg. 10. Switch legs, and repeat steps 1 through 3.  11. Repeat 2 to 4 times. Lower abdominal strengthening    1. Lie on your back with your knees bent and your feet flat on the floor. 2. Tighten your belly muscles by pulling your belly button in toward your spine. 3. Lift one foot off the floor and bring your knee toward your chest, so that your knee is straight above your hip and your leg is bent like the letter \"L. \"  4. Lift the other knee up to the same position. 5. Lower one leg at a time to the starting position. 6. Keep alternating legs until you have lifted each leg 8 to 12 times. 7. Be sure to keep your belly muscles tight and your back still as you are moving your legs. Be sure to breathe normally. Piriformis stretch    1. Lie on your back with your legs straight. 2. Lift your affected leg, and bend your knee. With your opposite hand, reach across your body, and then gently pull your knee toward your opposite shoulder. 3. Hold the stretch for 15 to 30 seconds. 4. Switch legs and repeat steps 1 through 3.  5. Repeat 2 to 4 times. Follow-up care is a key part of your treatment and safety. Be sure to make and go to all appointments, and call your doctor if you are having problems. It's also a good idea to know your test results and keep a list of the medicines you take. Where can you learn more? Go to https://SensAble Technologiesmary ellen.ClaimIt. org and sign in to your [employer] account. Enter A069 in the [facility] box to learn more about \"Sacroiliac Pain: Exercises. \"     If you do not have an account, please click on the \"Sign Up Now\" link. Current as of: September 20, 2018  Content Version: 12.1  © 8744-7221 Healthwise, Incorporated. Care instructions adapted under license by [facility] ([facility]).  If you have questions about a medical condition or this instruction, always ask your healthcare

## 2019-08-27 RX ORDER — METRONIDAZOLE 500 MG/1
500 TABLET ORAL 3 TIMES DAILY
Qty: 30 TABLET | Refills: 0 | Status: SHIPPED | OUTPATIENT
Start: 2019-08-27 | End: 2019-08-31

## 2019-08-27 RX ORDER — TRAMADOL HYDROCHLORIDE 50 MG/1
50 TABLET ORAL EVERY 12 HOURS PRN
Qty: 10 TABLET | Refills: 0 | Status: SHIPPED | OUTPATIENT
Start: 2019-08-27 | End: 2019-08-31 | Stop reason: ALTCHOICE

## 2019-08-27 RX ORDER — CIPROFLOXACIN 250 MG/1
250 TABLET, FILM COATED ORAL 2 TIMES DAILY
Qty: 20 TABLET | Refills: 0 | Status: SHIPPED | OUTPATIENT
Start: 2019-08-27 | End: 2019-08-31

## 2019-08-28 LAB
EKG ATRIAL RATE: 74 BPM
EKG P AXIS: 42 DEGREES
EKG P-R INTERVAL: 174 MS
EKG Q-T INTERVAL: 392 MS
EKG QRS DURATION: 94 MS
EKG QTC CALCULATION (BAZETT): 435 MS
EKG R AXIS: -42 DEGREES
EKG T AXIS: 31 DEGREES
EKG VENTRICULAR RATE: 74 BPM

## 2019-08-28 PROCEDURE — 93010 ELECTROCARDIOGRAM REPORT: CPT | Performed by: INTERNAL MEDICINE

## 2019-08-29 DIAGNOSIS — K21.9 GASTROESOPHAGEAL REFLUX DISEASE WITHOUT ESOPHAGITIS: ICD-10-CM

## 2019-08-29 RX ORDER — PANTOPRAZOLE SODIUM 20 MG/1
TABLET, DELAYED RELEASE ORAL
Qty: 90 TABLET | Refills: 1 | Status: SHIPPED | OUTPATIENT
Start: 2019-08-29 | End: 2019-10-16 | Stop reason: SDUPTHER

## 2019-08-30 ENCOUNTER — CARE COORDINATION (OUTPATIENT)
Dept: CARE COORDINATION | Age: 84
End: 2019-08-30

## 2019-08-30 ASSESSMENT — ENCOUNTER SYMPTOMS
BACK PAIN: 1
CONSTIPATION: 1
ABDOMINAL DISTENTION: 0
SHORTNESS OF BREATH: 0
SORE THROAT: 0
EYES NEGATIVE: 1
COUGH: 0

## 2019-08-31 ENCOUNTER — HOSPITAL ENCOUNTER (EMERGENCY)
Age: 84
Discharge: HOME | End: 2019-08-31
Attending: FAMILY MEDICINE
Payer: MEDICARE

## 2019-08-31 VITALS
DIASTOLIC BLOOD PRESSURE: 63 MMHG | SYSTOLIC BLOOD PRESSURE: 162 MMHG | TEMPERATURE: 98.2 F | RESPIRATION RATE: 18 BRPM | HEART RATE: 72 BPM | HEIGHT: 65 IN | WEIGHT: 169 LBS | BODY MASS INDEX: 28.16 KG/M2 | OXYGEN SATURATION: 100 %

## 2019-08-31 DIAGNOSIS — M54.50 ACUTE BILATERAL LOW BACK PAIN WITHOUT SCIATICA: Primary | ICD-10-CM

## 2019-08-31 PROCEDURE — 6360000002 HC RX W HCPCS: Performed by: FAMILY MEDICINE

## 2019-08-31 PROCEDURE — 99283 EMERGENCY DEPT VISIT LOW MDM: CPT

## 2019-08-31 PROCEDURE — 96372 THER/PROPH/DIAG INJ SC/IM: CPT

## 2019-08-31 PROCEDURE — 6370000000 HC RX 637 (ALT 250 FOR IP): Performed by: FAMILY MEDICINE

## 2019-08-31 PROCEDURE — 96374 THER/PROPH/DIAG INJ IV PUSH: CPT

## 2019-08-31 RX ORDER — HYDROCODONE BITARTRATE AND ACETAMINOPHEN 5; 325 MG/1; MG/1
1 TABLET ORAL EVERY 6 HOURS PRN
Qty: 10 TABLET | Refills: 0 | Status: SHIPPED | OUTPATIENT
Start: 2019-08-31 | End: 2019-09-03

## 2019-08-31 RX ORDER — HYDROCODONE BITARTRATE AND ACETAMINOPHEN 5; 325 MG/1; MG/1
2 TABLET ORAL ONCE
Status: COMPLETED | OUTPATIENT
Start: 2019-08-31 | End: 2019-08-31

## 2019-08-31 RX ORDER — KETOROLAC TROMETHAMINE 30 MG/ML
15 INJECTION, SOLUTION INTRAMUSCULAR; INTRAVENOUS ONCE
Status: COMPLETED | OUTPATIENT
Start: 2019-08-31 | End: 2019-08-31

## 2019-08-31 RX ORDER — ORPHENADRINE CITRATE 30 MG/ML
60 INJECTION INTRAMUSCULAR; INTRAVENOUS ONCE
Status: COMPLETED | OUTPATIENT
Start: 2019-08-31 | End: 2019-08-31

## 2019-08-31 RX ADMIN — HYDROCODONE BITARTRATE AND ACETAMINOPHEN 2 TABLET: 5; 325 TABLET ORAL at 22:13

## 2019-08-31 RX ADMIN — ORPHENADRINE CITRATE 60 MG: 30 INJECTION INTRAMUSCULAR; INTRAVENOUS at 21:06

## 2019-08-31 RX ADMIN — KETOROLAC TROMETHAMINE 15 MG: 30 INJECTION, SOLUTION INTRAMUSCULAR at 21:07

## 2019-08-31 ASSESSMENT — PAIN DESCRIPTION - ORIENTATION: ORIENTATION: LOWER

## 2019-08-31 ASSESSMENT — PAIN DESCRIPTION - FREQUENCY: FREQUENCY: CONTINUOUS

## 2019-08-31 ASSESSMENT — PAIN SCALES - GENERAL
PAINLEVEL_OUTOF10: 10
PAINLEVEL_OUTOF10: 7
PAINLEVEL_OUTOF10: 10
PAINLEVEL_OUTOF10: 7

## 2019-08-31 ASSESSMENT — PAIN DESCRIPTION - PROGRESSION: CLINICAL_PROGRESSION: GRADUALLY WORSENING

## 2019-08-31 ASSESSMENT — PAIN DESCRIPTION - PAIN TYPE: TYPE: ACUTE PAIN;CHRONIC PAIN

## 2019-08-31 ASSESSMENT — PAIN DESCRIPTION - DESCRIPTORS: DESCRIPTORS: STABBING;SHARP

## 2019-08-31 ASSESSMENT — PAIN DESCRIPTION - LOCATION: LOCATION: BACK

## 2019-09-01 NOTE — ED PROVIDER NOTES
Intravenous Given 8/31/19 2107)   orphenadrine (NORFLEX) injection 60 mg (60 mg Intramuscular Given 8/31/19 2106)   HYDROcodone-acetaminophen (NORCO) 5-325 MG per tablet 2 tablet (2 tablets Oral Given 8/31/19 2213)       New Prescriptions from this visit:    Discharge Medication List as of 8/31/2019 10:11 PM      START taking these medications    Details   HYDROcodone-acetaminophen (NORCO) 5-325 MG per tablet Take 1 tablet by mouth every 6 hours as needed for Pain for up to 3 days. Intended supply: 3 days. Take lowest dose possible to manage pain, Disp-10 tablet, R-0Print             Follow-up:  NOHELIA Artis - [facility]  Ping \[facility] [place] [facility]\""tory . 51. [phone]    Call in 2 days          Final Impression:   1.  Acute bilateral low back pain without sciatica               (Please note that portions of this note were completed with a voice recognition program.  Efforts were made to edit the dictations but occasionally words are mis-transcribed.)       Domenico Knight MD  09/01/19 8065

## 2019-09-03 ENCOUNTER — CARE COORDINATION (OUTPATIENT)
Dept: CARE COORDINATION | Age: 84
End: 2019-09-03

## 2019-09-03 ENCOUNTER — OFFICE VISIT (OUTPATIENT)
Dept: FAMILY MEDICINE CLINIC | Age: 84
End: 2019-09-03
Payer: MEDICARE

## 2019-09-03 ENCOUNTER — TELEPHONE (OUTPATIENT)
Dept: SURGERY | Age: 84
End: 2019-09-03

## 2019-09-03 VITALS
WEIGHT: 169 LBS | HEART RATE: 74 BPM | SYSTOLIC BLOOD PRESSURE: 126 MMHG | OXYGEN SATURATION: 94 % | TEMPERATURE: 98.3 F | DIASTOLIC BLOOD PRESSURE: 78 MMHG | BODY MASS INDEX: 28.12 KG/M2

## 2019-09-03 DIAGNOSIS — D50.9 IRON DEFICIENCY ANEMIA, UNSPECIFIED IRON DEFICIENCY ANEMIA TYPE: ICD-10-CM

## 2019-09-03 DIAGNOSIS — E11.22 CKD STAGE 3 DUE TO TYPE 2 DIABETES MELLITUS (HCC): ICD-10-CM

## 2019-09-03 DIAGNOSIS — M51.36 DDD (DEGENERATIVE DISC DISEASE), LUMBAR: ICD-10-CM

## 2019-09-03 DIAGNOSIS — N18.30 CKD STAGE 3 DUE TO TYPE 2 DIABETES MELLITUS (HCC): ICD-10-CM

## 2019-09-03 DIAGNOSIS — M46.1 BILATERAL SACROILIITIS (HCC): Primary | ICD-10-CM

## 2019-09-03 PROCEDURE — 99214 OFFICE O/P EST MOD 30 MIN: CPT | Performed by: NURSE PRACTITIONER

## 2019-09-03 PROCEDURE — G8417 CALC BMI ABV UP PARAM F/U: HCPCS | Performed by: NURSE PRACTITIONER

## 2019-09-03 PROCEDURE — G8427 DOCREV CUR MEDS BY ELIG CLIN: HCPCS | Performed by: NURSE PRACTITIONER

## 2019-09-03 PROCEDURE — 96372 THER/PROPH/DIAG INJ SC/IM: CPT | Performed by: NURSE PRACTITIONER

## 2019-09-03 PROCEDURE — 4040F PNEUMOC VAC/ADMIN/RCVD: CPT | Performed by: NURSE PRACTITIONER

## 2019-09-03 PROCEDURE — 1123F ACP DISCUSS/DSCN MKR DOCD: CPT | Performed by: NURSE PRACTITIONER

## 2019-09-03 PROCEDURE — 1036F TOBACCO NON-USER: CPT | Performed by: NURSE PRACTITIONER

## 2019-09-03 RX ORDER — METHYLPREDNISOLONE SODIUM SUCCINATE 125 MG/2ML
60 INJECTION, POWDER, LYOPHILIZED, FOR SOLUTION INTRAMUSCULAR; INTRAVENOUS ONCE
Status: COMPLETED | OUTPATIENT
Start: 2019-09-03 | End: 2019-09-03

## 2019-09-03 RX ADMIN — METHYLPREDNISOLONE SODIUM SUCCINATE 60 MG: 125 INJECTION, POWDER, LYOPHILIZED, FOR SOLUTION INTRAMUSCULAR; INTRAVENOUS at 12:54

## 2019-09-03 ASSESSMENT — ENCOUNTER SYMPTOMS
BACK PAIN: 1
WHEEZING: 0
RHINORRHEA: 1
ABDOMINAL PAIN: 0
COUGH: 1
SHORTNESS OF BREATH: 0
BOWEL INCONTINENCE: 0

## 2019-09-03 NOTE — PATIENT INSTRUCTIONS
and slowly pull back on the towel. You should feel a gentle stretch down the back of your leg. 10. Switch legs, and repeat steps 1 through 3.  11. Repeat 2 to 4 times. Lower abdominal strengthening    1. Lie on your back with your knees bent and your feet flat on the floor. 2. Tighten your belly muscles by pulling your belly button in toward your spine. 3. Lift one foot off the floor and bring your knee toward your chest, so that your knee is straight above your hip and your leg is bent like the letter \"L. \"  4. Lift the other knee up to the same position. 5. Lower one leg at a time to the starting position. 6. Keep alternating legs until you have lifted each leg 8 to 12 times. 7. Be sure to keep your belly muscles tight and your back still as you are moving your legs. Be sure to breathe normally. Piriformis stretch    1. Lie on your back with your legs straight. 2. Lift your affected leg, and bend your knee. With your opposite hand, reach across your body, and then gently pull your knee toward your opposite shoulder. 3. Hold the stretch for 15 to 30 seconds. 4. Switch legs and repeat steps 1 through 3.  5. Repeat 2 to 4 times. Follow-up care is a key part of your treatment and safety. Be sure to make and go to all appointments, and call your doctor if you are having problems. It's also a good idea to know your test results and keep a list of the medicines you take. Where can you learn more? Go to [email or web address]"[employer]mary [email or web address].Peerby. org and sign in to your [employer] account. Enter K383 in the [facility] box to learn more about \"Sacroiliac Pain: Exercises. \"     If you do not have an account, please click on the \"Sign Up Now\" link. Current as of: September 20, 2018  Content Version: 12.1  © 2119-5134 Healthwise, Incorporated. Care instructions adapted under license by [facility] ([facility]).  If you have questions about a medical condition or this instruction, always ask your healthcare

## 2019-09-03 NOTE — PROGRESS NOTES
tablet Take 1 tablet by mouth 2 times daily 180 tablet 1    ferrous sulfate 325 (65 Fe) MG tablet Take 1 tablet by mouth daily (with breakfast) 90 tablet 0    blood glucose test strips (EXACTECH TEST) strip 1 each by In Vitro route 3 times daily As needed. 300 each 3    traZODone (DESYREL) 50 MG tablet Take 1 tablet by mouth nightly 30 tablet 2    montelukast (SINGULAIR) 10 MG tablet take 1 tablet by mouth once daily  0    simvastatin (ZOCOR) 10 MG tablet take 1 tablet by mouth every evening 90 tablet 1    albuterol sulfate HFA (PROVENTIL HFA) 108 (90 Base) MCG/ACT inhaler Inhale 2 puffs into the lungs every 6 hours as needed for Wheezing 3 Inhaler 3    NONFORMULARY Take 1 tablet by mouth as needed (Leg cramp relief) Indications: Leg Cramps José's leg cramp relief 50 quick-dissolving tablets, homeopathic.  pantoprazole (PROTONIX) 40 MG tablet Take 1 tablet by mouth daily (Patient not taking: Reported on 9/3/2019) 90 tablet 1     No current facility-administered medications for this visit. Allergies   Allergen Reactions    Aspirin Other (See Comments)     States GI upset only with more than one low dose aspirin    Codeine Itching    Penicillins        Health Maintenance   Topic Date Due    DTaP/Tdap/Td vaccine (1 - Tdap) 05/10/1953    Shingles Vaccine (1 of 2) 05/10/1984    Annual Wellness Visit (AWV)  05/10/1997    Pneumococcal 65+ years Vaccine (1 of 2 - PCV13) 05/10/1999    Diabetic retinal exam  05/10/2009    A1C test (Diabetic or Prediabetic)  08/04/2019    Flu vaccine (1) 09/01/2019    Diabetic foot exam  02/05/2020    TSH testing  04/29/2020    Lipid screen  05/29/2020    Potassium monitoring  08/26/2020    Creatinine monitoring  08/26/2020       Subjective:      Review of Systems   Constitutional: Positive for activity change. Negative for appetite change and fatigue. HENT: Positive for rhinorrhea. Negative for congestion. Respiratory: Positive for cough.  Negative for

## 2019-09-05 ENCOUNTER — CARE COORDINATION (OUTPATIENT)
Dept: CARE COORDINATION | Age: 84
End: 2019-09-05

## 2019-09-06 ENCOUNTER — HOSPITAL ENCOUNTER (EMERGENCY)
Age: 84
Discharge: HOME | End: 2019-09-06
Attending: FAMILY MEDICINE
Payer: MEDICARE

## 2019-09-06 ENCOUNTER — APPOINTMENT (OUTPATIENT)
Dept: CT IMAGING | Age: 84
End: 2019-09-06
Payer: MEDICARE

## 2019-09-06 VITALS
HEIGHT: 65 IN | HEART RATE: 85 BPM | BODY MASS INDEX: 27.77 KG/M2 | RESPIRATION RATE: 18 BRPM | OXYGEN SATURATION: 97 % | WEIGHT: 166.67 LBS | TEMPERATURE: 98.7 F | SYSTOLIC BLOOD PRESSURE: 143 MMHG | DIASTOLIC BLOOD PRESSURE: 56 MMHG

## 2019-09-06 DIAGNOSIS — G89.29 ACUTE EXACERBATION OF CHRONIC LOW BACK PAIN: Primary | ICD-10-CM

## 2019-09-06 DIAGNOSIS — M54.50 ACUTE EXACERBATION OF CHRONIC LOW BACK PAIN: Primary | ICD-10-CM

## 2019-09-06 LAB
ABSOLUTE EOS #: 0.1 K/UL (ref 0–0.4)
ABSOLUTE IMMATURE GRANULOCYTE: ABNORMAL K/UL (ref 0–0.3)
ABSOLUTE LYMPH #: 0.7 K/UL (ref 1–4.8)
ABSOLUTE MONO #: 0.6 K/UL (ref 0–1)
ANION GAP SERPL CALCULATED.3IONS-SCNC: 13 MMOL/L (ref 9–17)
BASOPHILS # BLD: 1 % (ref 0–2)
BASOPHILS ABSOLUTE: 0.1 K/UL (ref 0–0.2)
BUN BLDV-MCNC: 26 MG/DL (ref 8–23)
BUN/CREAT BLD: 18 (ref 9–20)
CALCIUM SERPL-MCNC: 10 MG/DL (ref 8.6–10.4)
CHLORIDE BLD-SCNC: 98 MMOL/L (ref 98–107)
CO2: 26 MMOL/L (ref 20–31)
CREAT SERPL-MCNC: 1.48 MG/DL (ref 0.7–1.2)
DIFFERENTIAL TYPE: YES
EOSINOPHILS RELATIVE PERCENT: 1 % (ref 0–5)
GFR AFRICAN AMERICAN: 55 ML/MIN
GFR NON-AFRICAN AMERICAN: 45 ML/MIN
GFR SERPL CREATININE-BSD FRML MDRD: ABNORMAL ML/MIN/{1.73_M2}
GFR SERPL CREATININE-BSD FRML MDRD: ABNORMAL ML/MIN/{1.73_M2}
GLUCOSE BLD-MCNC: 360 MG/DL (ref 70–99)
HCT VFR BLD CALC: 29.4 % (ref 41–53)
HEMOGLOBIN: 9.8 G/DL (ref 13.5–17.5)
IMMATURE GRANULOCYTES: ABNORMAL %
LYMPHOCYTES # BLD: 8 % (ref 13–44)
MAGNESIUM: 2.2 MG/DL (ref 1.6–2.6)
MCH RBC QN AUTO: 29.8 PG (ref 26–34)
MCHC RBC AUTO-ENTMCNC: 33.2 G/DL (ref 31–37)
MCV RBC AUTO: 90 FL (ref 80–100)
MONOCYTES # BLD: 7 % (ref 5–9)
NRBC AUTOMATED: ABNORMAL PER 100 WBC
PDW BLD-RTO: 17 % (ref 12.1–15.2)
PLATELET # BLD: 257 K/UL (ref 140–450)
PLATELET ESTIMATE: ABNORMAL
PMV BLD AUTO: ABNORMAL FL (ref 6–12)
POTASSIUM SERPL-SCNC: 3.4 MMOL/L (ref 3.7–5.3)
RBC # BLD: 3.27 M/UL (ref 4.5–5.9)
RBC # BLD: ABNORMAL 10*6/UL
SEG NEUTROPHILS: 83 % (ref 39–75)
SEGMENTED NEUTROPHILS ABSOLUTE COUNT: 6.9 K/UL (ref 2.1–6.5)
SODIUM BLD-SCNC: 137 MMOL/L (ref 135–144)
WBC # BLD: 8.3 K/UL (ref 3.5–11)
WBC # BLD: ABNORMAL 10*3/UL

## 2019-09-06 PROCEDURE — 83735 ASSAY OF MAGNESIUM: CPT

## 2019-09-06 PROCEDURE — 85025 COMPLETE CBC W/AUTO DIFF WBC: CPT

## 2019-09-06 PROCEDURE — 6370000000 HC RX 637 (ALT 250 FOR IP): Performed by: EMERGENCY MEDICINE

## 2019-09-06 PROCEDURE — 74176 CT ABD & PELVIS W/O CONTRAST: CPT

## 2019-09-06 PROCEDURE — 99284 EMERGENCY DEPT VISIT MOD MDM: CPT

## 2019-09-06 PROCEDURE — 36415 COLL VENOUS BLD VENIPUNCTURE: CPT

## 2019-09-06 PROCEDURE — 80048 BASIC METABOLIC PNL TOTAL CA: CPT

## 2019-09-06 RX ORDER — HYDROCODONE BITARTRATE AND ACETAMINOPHEN 5; 325 MG/1; MG/1
1 TABLET ORAL ONCE
Status: COMPLETED | OUTPATIENT
Start: 2019-09-06 | End: 2019-09-06

## 2019-09-06 RX ORDER — HYDROCODONE BITARTRATE AND ACETAMINOPHEN 5; 325 MG/1; MG/1
1 TABLET ORAL EVERY 6 HOURS PRN
Qty: 12 TABLET | Refills: 0 | Status: SHIPPED | OUTPATIENT
Start: 2019-09-06 | End: 2019-09-09

## 2019-09-06 RX ORDER — ACETAMINOPHEN 500 MG
1000 TABLET ORAL EVERY 8 HOURS
COMMUNITY

## 2019-09-06 RX ADMIN — HYDROCODONE BITARTRATE AND ACETAMINOPHEN 1 TABLET: 5; 325 TABLET ORAL at 13:17

## 2019-09-06 ASSESSMENT — PAIN DESCRIPTION - ONSET: ONSET: ON-GOING

## 2019-09-06 ASSESSMENT — PAIN DESCRIPTION - PROGRESSION: CLINICAL_PROGRESSION: NOT CHANGED

## 2019-09-06 ASSESSMENT — PAIN DESCRIPTION - DESCRIPTORS: DESCRIPTORS: THROBBING

## 2019-09-06 ASSESSMENT — PAIN SCALES - GENERAL
PAINLEVEL_OUTOF10: 10
PAINLEVEL_OUTOF10: 10

## 2019-09-06 ASSESSMENT — PAIN DESCRIPTION - DIRECTION: RADIATING_TOWARDS: DENIES

## 2019-09-06 ASSESSMENT — PAIN DESCRIPTION - LOCATION: LOCATION: BACK

## 2019-09-06 ASSESSMENT — PAIN DESCRIPTION - FREQUENCY: FREQUENCY: INTERMITTENT

## 2019-09-06 ASSESSMENT — PAIN DESCRIPTION - ORIENTATION: ORIENTATION: RIGHT;LEFT;LOWER

## 2019-09-06 ASSESSMENT — PAIN DESCRIPTION - PAIN TYPE: TYPE: ACUTE PAIN

## 2019-09-06 NOTE — ED PROVIDER NOTES
them to follow-up with pain management and they will call their primary doctor for official referral.  Patient at this point is comfortable and we will discharged home to return if he develops any other symptoms.      Dary Justice MD  09/06/19 [id number]

## 2019-09-09 ENCOUNTER — TELEPHONE (OUTPATIENT)
Dept: FAMILY MEDICINE CLINIC | Age: 84
End: 2019-09-09

## 2019-09-11 ENCOUNTER — TELEPHONE (OUTPATIENT)
Dept: FAMILY MEDICINE CLINIC | Age: 84
End: 2019-09-11

## 2019-09-12 ENCOUNTER — TELEPHONE (OUTPATIENT)
Dept: FAMILY MEDICINE CLINIC | Age: 84
End: 2019-09-12

## 2019-09-16 ENCOUNTER — CARE COORDINATION (OUTPATIENT)
Dept: CARE COORDINATION | Age: 84
End: 2019-09-16

## 2019-09-16 NOTE — CARE COORDINATION
Patient was called to follow up with back pain and BS. There was no answer. A message was left on voicemail to have patient call back. Office number given [phone]. pt has appt  with PCP this evening, reminder of appt also left on VM.        FU appts/Provider:    Future Appointments   Date Time Provider Mai Gutierrez   9/16/2019  7:30 PM NOHELIA Dolan CNP [facility]   10/2/2019 10:40 AM [address] Nolia Schirmer

## 2019-09-23 ENCOUNTER — CARE COORDINATION (OUTPATIENT)
Dept: CARE COORDINATION | Age: 84
End: 2019-09-23

## 2019-09-30 ENCOUNTER — CARE COORDINATION (OUTPATIENT)
Dept: CARE COORDINATION | Age: 84
End: 2019-09-30

## 2019-09-30 RX ORDER — TRAMADOL HYDROCHLORIDE 50 MG/1
50 TABLET ORAL EVERY 6 HOURS PRN
COMMUNITY
End: 2020-01-01 | Stop reason: ALTCHOICE

## 2019-10-04 ENCOUNTER — HOSPITAL ENCOUNTER (EMERGENCY)
Age: 84
Discharge: HOME | End: 2019-10-04
Attending: INTERNAL MEDICINE
Payer: MEDICARE

## 2019-10-04 ENCOUNTER — APPOINTMENT (OUTPATIENT)
Dept: GENERAL RADIOLOGY | Age: 84
End: 2019-10-04
Payer: MEDICARE

## 2019-10-04 VITALS
OXYGEN SATURATION: 95 % | TEMPERATURE: 98.3 F | HEIGHT: 65 IN | SYSTOLIC BLOOD PRESSURE: 152 MMHG | DIASTOLIC BLOOD PRESSURE: 61 MMHG | WEIGHT: 150.9 LBS | RESPIRATION RATE: 20 BRPM | HEART RATE: 73 BPM | BODY MASS INDEX: 25.14 KG/M2

## 2019-10-04 DIAGNOSIS — M54.31 SCIATICA OF RIGHT SIDE: ICD-10-CM

## 2019-10-04 DIAGNOSIS — M54.50 ACUTE EXACERBATION OF CHRONIC LOW BACK PAIN: Primary | ICD-10-CM

## 2019-10-04 DIAGNOSIS — G89.29 ACUTE EXACERBATION OF CHRONIC LOW BACK PAIN: Primary | ICD-10-CM

## 2019-10-04 DIAGNOSIS — R73.9 HYPERGLYCEMIA: ICD-10-CM

## 2019-10-04 DIAGNOSIS — I10 ESSENTIAL HYPERTENSION: ICD-10-CM

## 2019-10-04 LAB
CHP ED QC CHECK: NORMAL
GLUCOSE BLD-MCNC: 166 MG/DL
GLUCOSE BLD-MCNC: 166 MG/DL (ref 65–99)

## 2019-10-04 PROCEDURE — 99284 EMERGENCY DEPT VISIT MOD MDM: CPT

## 2019-10-04 PROCEDURE — 82947 ASSAY GLUCOSE BLOOD QUANT: CPT

## 2019-10-04 PROCEDURE — 96375 TX/PRO/DX INJ NEW DRUG ADDON: CPT

## 2019-10-04 PROCEDURE — 96374 THER/PROPH/DIAG INJ IV PUSH: CPT

## 2019-10-04 PROCEDURE — 6360000002 HC RX W HCPCS: Performed by: INTERNAL MEDICINE

## 2019-10-04 PROCEDURE — 72110 X-RAY EXAM L-2 SPINE 4/>VWS: CPT

## 2019-10-04 RX ORDER — ONDANSETRON 2 MG/ML
4 INJECTION INTRAMUSCULAR; INTRAVENOUS ONCE
Status: COMPLETED | OUTPATIENT
Start: 2019-10-04 | End: 2019-10-04

## 2019-10-04 RX ORDER — MORPHINE SULFATE 4 MG/ML
4 INJECTION, SOLUTION INTRAMUSCULAR; INTRAVENOUS ONCE
Status: COMPLETED | OUTPATIENT
Start: 2019-10-04 | End: 2019-10-04

## 2019-10-04 RX ADMIN — MORPHINE SULFATE 4 MG: 4 INJECTION INTRAVENOUS at 05:38

## 2019-10-04 RX ADMIN — ONDANSETRON 4 MG: 2 INJECTION, SOLUTION INTRAMUSCULAR; INTRAVENOUS at 05:38

## 2019-10-04 ASSESSMENT — PAIN DESCRIPTION - LOCATION
LOCATION: BACK;LEG
LOCATION: BACK;LEG

## 2019-10-04 ASSESSMENT — PAIN SCALES - GENERAL
PAINLEVEL_OUTOF10: 6
PAINLEVEL_OUTOF10: 8
PAINLEVEL_OUTOF10: 8

## 2019-10-04 ASSESSMENT — PAIN DESCRIPTION - FREQUENCY: FREQUENCY: CONTINUOUS

## 2019-10-04 ASSESSMENT — PAIN DESCRIPTION - ORIENTATION: ORIENTATION: RIGHT

## 2019-10-04 ASSESSMENT — PAIN DESCRIPTION - DESCRIPTORS: DESCRIPTORS: CONSTANT

## 2019-10-07 ENCOUNTER — CARE COORDINATION (OUTPATIENT)
Dept: CARE COORDINATION | Age: 84
End: 2019-10-07

## 2019-10-08 DIAGNOSIS — J44.9 CHRONIC OBSTRUCTIVE PULMONARY DISEASE, UNSPECIFIED COPD TYPE (HCC): Primary | ICD-10-CM

## 2019-10-08 DIAGNOSIS — E08.00 DIABETES MELLITUS DUE TO UNDERLYING CONDITION WITH HYPEROSMOLARITY WITHOUT COMA, WITHOUT LONG-TERM CURRENT USE OF INSULIN (HCC): ICD-10-CM

## 2019-10-09 ENCOUNTER — CARE COORDINATION (OUTPATIENT)
Dept: CARE COORDINATION | Age: 84
End: 2019-10-09

## 2019-10-09 ENCOUNTER — HOSPITAL ENCOUNTER (EMERGENCY)
Age: 84
Discharge: HOME | End: 2019-10-10
Attending: FAMILY MEDICINE
Payer: MEDICARE

## 2019-10-09 DIAGNOSIS — K11.7 XEROSTOMIA: Primary | ICD-10-CM

## 2019-10-09 DIAGNOSIS — G89.29 CHRONIC LOW BACK PAIN WITH SCIATICA, SCIATICA LATERALITY UNSPECIFIED, UNSPECIFIED BACK PAIN LATERALITY: ICD-10-CM

## 2019-10-09 DIAGNOSIS — M54.40 CHRONIC LOW BACK PAIN WITH SCIATICA, SCIATICA LATERALITY UNSPECIFIED, UNSPECIFIED BACK PAIN LATERALITY: ICD-10-CM

## 2019-10-09 PROCEDURE — 99283 EMERGENCY DEPT VISIT LOW MDM: CPT

## 2019-10-09 ASSESSMENT — PAIN DESCRIPTION - LOCATION: LOCATION: BACK

## 2019-10-09 ASSESSMENT — PAIN SCALES - GENERAL: PAINLEVEL_OUTOF10: 8

## 2019-10-09 ASSESSMENT — PAIN DESCRIPTION - PAIN TYPE: TYPE: CHRONIC PAIN

## 2019-10-10 ENCOUNTER — CARE COORDINATION (OUTPATIENT)
Dept: CARE COORDINATION | Age: 84
End: 2019-10-10

## 2019-10-10 VITALS
HEIGHT: 65 IN | SYSTOLIC BLOOD PRESSURE: 153 MMHG | DIASTOLIC BLOOD PRESSURE: 76 MMHG | WEIGHT: 150 LBS | RESPIRATION RATE: 18 BRPM | HEART RATE: 98 BPM | OXYGEN SATURATION: 99 % | BODY MASS INDEX: 24.99 KG/M2 | TEMPERATURE: 98.3 F

## 2019-10-10 RX ORDER — XYLITOL/YERBA SANTA
AEROSOL, SPRAY WITH PUMP (ML) MUCOUS MEMBRANE
Qty: 240 ML | Refills: 1 | Status: SHIPPED | OUTPATIENT
Start: 2019-10-10 | End: 2020-01-01 | Stop reason: ALTCHOICE

## 2019-10-10 RX ORDER — MELOXICAM 7.5 MG/1
7.5 TABLET ORAL DAILY
COMMUNITY
End: 2020-01-01 | Stop reason: ALTCHOICE

## 2019-10-10 ASSESSMENT — ENCOUNTER SYMPTOMS: BACK PAIN: 1

## 2019-10-11 ENCOUNTER — CARE COORDINATION (OUTPATIENT)
Dept: CARE COORDINATION | Age: 84
End: 2019-10-11

## 2019-10-14 ENCOUNTER — CARE COORDINATION (OUTPATIENT)
Dept: CARE COORDINATION | Age: 84
End: 2019-10-14

## 2019-10-15 ENCOUNTER — CARE COORDINATION (OUTPATIENT)
Dept: CARE COORDINATION | Age: 84
End: 2019-10-15

## 2019-10-16 ENCOUNTER — OFFICE VISIT (OUTPATIENT)
Dept: PRIMARY CARE CLINIC | Age: 84
End: 2019-10-16
Payer: MEDICARE

## 2019-10-16 VITALS
OXYGEN SATURATION: 98 % | DIASTOLIC BLOOD PRESSURE: 86 MMHG | TEMPERATURE: 97.8 F | HEART RATE: 108 BPM | SYSTOLIC BLOOD PRESSURE: 136 MMHG | BODY MASS INDEX: 24.79 KG/M2 | WEIGHT: 149 LBS

## 2019-10-16 DIAGNOSIS — E03.9 ACQUIRED HYPOTHYROIDISM: ICD-10-CM

## 2019-10-16 DIAGNOSIS — G89.29 CHRONIC RIGHT-SIDED LOW BACK PAIN WITH RIGHT-SIDED SCIATICA: Primary | ICD-10-CM

## 2019-10-16 DIAGNOSIS — D50.9 IRON DEFICIENCY ANEMIA, UNSPECIFIED IRON DEFICIENCY ANEMIA TYPE: ICD-10-CM

## 2019-10-16 DIAGNOSIS — M51.36 DDD (DEGENERATIVE DISC DISEASE), LUMBAR: ICD-10-CM

## 2019-10-16 DIAGNOSIS — E08.00 DIABETES MELLITUS DUE TO UNDERLYING CONDITION WITH HYPEROSMOLARITY WITHOUT COMA, WITHOUT LONG-TERM CURRENT USE OF INSULIN (HCC): ICD-10-CM

## 2019-10-16 DIAGNOSIS — M54.41 CHRONIC RIGHT-SIDED LOW BACK PAIN WITH RIGHT-SIDED SCIATICA: Primary | ICD-10-CM

## 2019-10-16 LAB — HBA1C MFR BLD: 8.2 %

## 2019-10-16 PROCEDURE — 4040F PNEUMOC VAC/ADMIN/RCVD: CPT | Performed by: NURSE PRACTITIONER

## 2019-10-16 PROCEDURE — 1036F TOBACCO NON-USER: CPT | Performed by: NURSE PRACTITIONER

## 2019-10-16 PROCEDURE — 99214 OFFICE O/P EST MOD 30 MIN: CPT | Performed by: NURSE PRACTITIONER

## 2019-10-16 PROCEDURE — G8420 CALC BMI NORM PARAMETERS: HCPCS | Performed by: NURSE PRACTITIONER

## 2019-10-16 PROCEDURE — G8427 DOCREV CUR MEDS BY ELIG CLIN: HCPCS | Performed by: NURSE PRACTITIONER

## 2019-10-16 PROCEDURE — 83036 HEMOGLOBIN GLYCOSYLATED A1C: CPT | Performed by: NURSE PRACTITIONER

## 2019-10-16 PROCEDURE — 1123F ACP DISCUSS/DSCN MKR DOCD: CPT | Performed by: NURSE PRACTITIONER

## 2019-10-16 PROCEDURE — G8484 FLU IMMUNIZE NO ADMIN: HCPCS | Performed by: NURSE PRACTITIONER

## 2019-10-16 ASSESSMENT — ENCOUNTER SYMPTOMS
SHORTNESS OF BREATH: 0
ABDOMINAL DISTENTION: 0
CONSTIPATION: 1
WHEEZING: 0
COUGH: 0
BACK PAIN: 1
EYES NEGATIVE: 1
SORE THROAT: 0

## 2019-10-17 ENCOUNTER — CARE COORDINATION (OUTPATIENT)
Dept: CARE COORDINATION | Age: 84
End: 2019-10-17

## 2019-10-18 RX ORDER — TRAZODONE HYDROCHLORIDE 50 MG/1
50 TABLET ORAL NIGHTLY
Qty: 30 TABLET | Refills: 2 | Status: SHIPPED | OUTPATIENT
Start: 2019-10-18 | End: 2020-01-01 | Stop reason: ALTCHOICE

## 2019-10-21 ENCOUNTER — CARE COORDINATION (OUTPATIENT)
Dept: CARE COORDINATION | Age: 84
End: 2019-10-21

## 2019-10-22 ENCOUNTER — CARE COORDINATION (OUTPATIENT)
Dept: CARE COORDINATION | Age: 84
End: 2019-10-22

## 2019-10-23 ENCOUNTER — CARE COORDINATION (OUTPATIENT)
Dept: CARE COORDINATION | Age: 84
End: 2019-10-23

## 2019-10-24 ENCOUNTER — CARE COORDINATION (OUTPATIENT)
Dept: CARE COORDINATION | Age: 84
End: 2019-10-24

## 2019-10-25 DIAGNOSIS — E78.00 HYPERCHOLESTEROLEMIA: ICD-10-CM

## 2019-10-29 ENCOUNTER — CARE COORDINATION (OUTPATIENT)
Dept: CARE COORDINATION | Age: 84
End: 2019-10-29

## 2019-10-30 RX ORDER — SIMVASTATIN 10 MG
TABLET ORAL
Qty: 90 TABLET | Refills: 1 | Status: SHIPPED | OUTPATIENT
Start: 2019-10-30 | End: 2020-01-01 | Stop reason: ALTCHOICE

## 2019-11-04 ENCOUNTER — CARE COORDINATION (OUTPATIENT)
Dept: CARE COORDINATION | Age: 84
End: 2019-11-04

## 2019-11-11 ENCOUNTER — CARE COORDINATION (OUTPATIENT)
Dept: CARE COORDINATION | Age: 84
End: 2019-11-11

## 2019-11-27 ENCOUNTER — HOSPITAL ENCOUNTER (OUTPATIENT)
Age: 84
Discharge: HOME | End: 2019-11-27
Payer: COMMERCIAL

## 2019-11-27 ENCOUNTER — HOSPITAL ENCOUNTER (OUTPATIENT)
Dept: NURSING | Age: 84
Setting detail: INFUSION SERIES
Discharge: HOME | End: 2019-11-27
Payer: COMMERCIAL

## 2019-11-27 ENCOUNTER — CARE COORDINATION (OUTPATIENT)
Dept: CARE COORDINATION | Age: 84
End: 2019-11-27

## 2019-11-27 VITALS
SYSTOLIC BLOOD PRESSURE: 126 MMHG | BODY MASS INDEX: 25.63 KG/M2 | RESPIRATION RATE: 18 BRPM | OXYGEN SATURATION: 94 % | HEART RATE: 68 BPM | TEMPERATURE: 98.1 F | DIASTOLIC BLOOD PRESSURE: 57 MMHG | WEIGHT: 154 LBS

## 2019-11-27 PROCEDURE — 86901 BLOOD TYPING SEROLOGIC RH(D): CPT

## 2019-11-27 PROCEDURE — 86900 BLOOD TYPING SEROLOGIC ABO: CPT

## 2019-11-27 PROCEDURE — 2580000003 HC RX 258: Performed by: FAMILY MEDICINE

## 2019-11-27 PROCEDURE — 36415 COLL VENOUS BLD VENIPUNCTURE: CPT

## 2019-11-27 PROCEDURE — 36430 TRANSFUSION BLD/BLD COMPNT: CPT

## 2019-11-27 PROCEDURE — P9016 RBC LEUKOCYTES REDUCED: HCPCS

## 2019-11-27 PROCEDURE — 86850 RBC ANTIBODY SCREEN: CPT

## 2019-11-27 PROCEDURE — 86920 COMPATIBILITY TEST SPIN: CPT

## 2019-11-27 RX ORDER — 0.9 % SODIUM CHLORIDE 0.9 %
250 INTRAVENOUS SOLUTION INTRAVENOUS ONCE
Status: COMPLETED | OUTPATIENT
Start: 2019-11-27 | End: 2019-11-27

## 2019-11-27 RX ORDER — SODIUM CHLORIDE 0.9 % (FLUSH) 0.9 %
10 SYRINGE (ML) INJECTION PRN
Status: DISCONTINUED | OUTPATIENT
Start: 2019-11-27 | End: 2019-11-28 | Stop reason: HOSPADM

## 2019-11-27 RX ADMIN — Medication 10 ML: at 13:30

## 2019-11-27 RX ADMIN — SODIUM CHLORIDE 250 ML: 9 INJECTION, SOLUTION INTRAVENOUS at 13:34

## 2019-11-27 NOTE — PROGRESS NOTES
Voids 200 ml in urinal, then pt taken to Rm. 259 per w/c. IV site without redness or swelling. Report given to Anita Dakin, RN.

## 2019-11-27 NOTE — PROGRESS NOTES
Pt's dtr [facility] calls requiring about him. Dtr asked if they would be away someone could take her dad back to [address] She stated Asha Priscilla will contact one of her cousins. \"

## 2019-11-27 NOTE — PROGRESS NOTES
Toby Napier at [facility] [phone] regarding pt's transportation issues after transfusion and the family's request of wanting an ambulance. Alize Cash reports that [facility] has an after hours transport to get the pt. When pt is finished with transfusion contact Wendy and ask for nurse on [place]. Alize Cash also reports pt's diet is ground mental or dental soft and drinks lots of nutritional drinks.

## 2019-11-27 NOTE — PROGRESS NOTES
Tolerating blood transfusion without c/o. IV rate increased to 150 ml/hr/pump. [place] warmer placed for continued c/o feeling cold. Sips water. IV site to right arm without redness or swelling.

## 2019-11-27 NOTE — PROGRESS NOTES
Galen Messina, nurse at [facility] notified of estimated time that transfusion will be completed around 2130 [id number], Will Mayuri reports \"that may be an issue\" and she will call me back.

## 2019-11-27 NOTE — PROGRESS NOTES
Pt arrives per w/c from [facility] for blood transfusion today. C/o pain in back & legs. Assisted into bed. Warm blankets provided for c/o being cold.

## 2019-11-27 NOTE — PROGRESS NOTES
Pt's nephew comes to nurse desk and states \"he (the pt) will need to return back to [facility] by ambulance. \" the nephew reports that he thinks his nephew is to weak to take in a car and feels it is to unsafe to transport him by private car.

## 2019-11-28 LAB
ABO/RH: NORMAL
ANTIBODY SCREEN: NEGATIVE
ARM BAND NUMBER: NORMAL
BLD PROD TYP BPU: NORMAL
BLD PROD TYP BPU: NORMAL
CROSSMATCH RESULT: NORMAL
CROSSMATCH RESULT: NORMAL
DISPENSE STATUS BLOOD BANK: NORMAL
DISPENSE STATUS BLOOD BANK: NORMAL
EXPIRATION DATE: NORMAL
TRANSFUSION STATUS: NORMAL
TRANSFUSION STATUS: NORMAL
UNIT DIVISION: 0
UNIT DIVISION: 0
UNIT NUMBER: NORMAL
UNIT NUMBER: NORMAL

## 2019-11-28 NOTE — PROGRESS NOTES
Blood transfusion complete- flushing line. Vitals taken- pt states \" I feel pretty good. \"  Pt tolerating well. Will continue to monitor.

## 2019-11-28 NOTE — PROGRESS NOTES
Vital signs taken 126/57 MAP 91 oxygen 94% on room air P 68. IV removed. Skin tear noted to L wrist slight bleeding- dry dressing applied. Pt tolerated well.

## 2019-11-28 NOTE — PROGRESS NOTES
Transport arrived from [place]. Reviewed transfusion reaction paperwork with patient- copy given. Denies needs. Pt is moved to wheelchair and escorted by supervisor downstairs.

## 2019-11-28 NOTE — PROGRESS NOTES
Walked into room, observed blood over patient's hands and blankets. Patient pulled out IV. Blood transfusion stopped to restart another IV. IV restarted on Right Forearm x1 attempt. Restarted blood transfusion with out any complications. Re-oriented patient to situation. Will continue to monitor, call light within reach.

## 2020-01-01 ENCOUNTER — OFFICE VISIT (OUTPATIENT)
Dept: UROLOGY | Age: 85
End: 2020-01-01
Payer: MEDICARE

## 2020-01-01 ENCOUNTER — HOSPITAL ENCOUNTER (EMERGENCY)
Age: 85
Discharge: HOME | End: 2020-04-20
Attending: EMERGENCY MEDICINE
Payer: MEDICARE

## 2020-01-01 ENCOUNTER — HOSPITAL ENCOUNTER (OUTPATIENT)
Dept: GENERAL RADIOLOGY | Age: 85
Discharge: HOME | End: 2020-04-23
Payer: COMMERCIAL

## 2020-01-01 ENCOUNTER — HOSPITAL ENCOUNTER (OUTPATIENT)
Dept: NURSING | Age: 85
Setting detail: INFUSION SERIES
Discharge: HOME | End: 2020-10-29
Payer: MEDICARE

## 2020-01-01 ENCOUNTER — APPOINTMENT (OUTPATIENT)
Dept: CT IMAGING | Age: 85
End: 2020-01-01
Payer: MEDICARE

## 2020-01-01 ENCOUNTER — HOSPITAL ENCOUNTER (INPATIENT)
Age: 85
LOS: 1 days | Discharge: NURSING, REHAB OR LONG-TERM CARE | DRG: 689 | End: 2020-04-11
Attending: INTERNAL MEDICINE | Admitting: INTERNAL MEDICINE
Payer: MEDICARE

## 2020-01-01 ENCOUNTER — APPOINTMENT (OUTPATIENT)
Dept: GENERAL RADIOLOGY | Age: 85
End: 2020-01-01
Payer: MEDICARE

## 2020-01-01 ENCOUNTER — HOSPITAL ENCOUNTER (OUTPATIENT)
Dept: NURSING | Age: 85
Setting detail: INFUSION SERIES
End: 2020-01-01
Payer: MEDICARE

## 2020-01-01 ENCOUNTER — HOSPITAL ENCOUNTER (EMERGENCY)
Age: 85
Discharge: ANOTHER HOSPITAL | End: 2020-12-03
Attending: FAMILY MEDICINE
Payer: MEDICARE

## 2020-01-01 ENCOUNTER — TELEPHONE (OUTPATIENT)
Dept: UROLOGY | Age: 85
End: 2020-01-01

## 2020-01-01 ENCOUNTER — HOSPITAL ENCOUNTER (OUTPATIENT)
Age: 85
Setting detail: SPECIMEN
Discharge: HOME | End: 2020-05-12
Payer: MEDICARE

## 2020-01-01 ENCOUNTER — HOSPITAL ENCOUNTER (EMERGENCY)
Age: 85
Discharge: ANOTHER HOSPITAL | End: 2020-04-10
Attending: FAMILY MEDICINE
Payer: MEDICARE

## 2020-01-01 ENCOUNTER — HOSPITAL ENCOUNTER (EMERGENCY)
Age: 85
Discharge: HOME | End: 2020-05-05
Attending: FAMILY MEDICINE
Payer: MEDICARE

## 2020-01-01 ENCOUNTER — HOSPITAL ENCOUNTER (EMERGENCY)
Age: 85
Discharge: HOME | End: 2020-04-20
Attending: FAMILY MEDICINE
Payer: MEDICARE

## 2020-01-01 ENCOUNTER — HOSPITAL ENCOUNTER (OUTPATIENT)
Age: 85
Discharge: HOME | End: 2020-04-23
Payer: COMMERCIAL

## 2020-01-01 ENCOUNTER — CARE COORDINATION (OUTPATIENT)
Dept: CARE COORDINATION | Age: 85
End: 2020-01-01

## 2020-01-01 ENCOUNTER — VIRTUAL VISIT (OUTPATIENT)
Dept: UROLOGY | Age: 85
End: 2020-01-01
Payer: MEDICARE

## 2020-01-01 ENCOUNTER — HOSPITAL ENCOUNTER (INPATIENT)
Age: 85
LOS: 2 days | Discharge: NURSING, REHAB OR LONG-TERM CARE | DRG: 177 | End: 2020-12-05
Attending: INTERNAL MEDICINE | Admitting: INTERNAL MEDICINE
Payer: MEDICARE

## 2020-01-01 ENCOUNTER — HOSPITAL ENCOUNTER (OUTPATIENT)
Age: 85
Discharge: HOME | End: 2020-10-29
Payer: MEDICARE

## 2020-01-01 VITALS
WEIGHT: 165 LBS | OXYGEN SATURATION: 95 % | TEMPERATURE: 98.2 F | HEIGHT: 65 IN | HEART RATE: 61 BPM | RESPIRATION RATE: 20 BRPM | DIASTOLIC BLOOD PRESSURE: 53 MMHG | BODY MASS INDEX: 27.49 KG/M2 | SYSTOLIC BLOOD PRESSURE: 131 MMHG

## 2020-01-01 VITALS
HEART RATE: 55 BPM | DIASTOLIC BLOOD PRESSURE: 67 MMHG | SYSTOLIC BLOOD PRESSURE: 141 MMHG | RESPIRATION RATE: 17 BRPM | OXYGEN SATURATION: 100 % | TEMPERATURE: 96.4 F

## 2020-01-01 VITALS
HEIGHT: 65 IN | TEMPERATURE: 97.7 F | SYSTOLIC BLOOD PRESSURE: 107 MMHG | WEIGHT: 147.5 LBS | DIASTOLIC BLOOD PRESSURE: 46 MMHG | BODY MASS INDEX: 24.57 KG/M2 | HEART RATE: 63 BPM | OXYGEN SATURATION: 99 % | RESPIRATION RATE: 16 BRPM

## 2020-01-01 VITALS
TEMPERATURE: 98 F | WEIGHT: 152 LBS | HEART RATE: 71 BPM | RESPIRATION RATE: 18 BRPM | BODY MASS INDEX: 25.33 KG/M2 | SYSTOLIC BLOOD PRESSURE: 173 MMHG | DIASTOLIC BLOOD PRESSURE: 69 MMHG | HEIGHT: 65 IN | OXYGEN SATURATION: 99 %

## 2020-01-01 VITALS
BODY MASS INDEX: 24.66 KG/M2 | SYSTOLIC BLOOD PRESSURE: 120 MMHG | HEIGHT: 65 IN | HEART RATE: 58 BPM | WEIGHT: 148 LBS | DIASTOLIC BLOOD PRESSURE: 56 MMHG

## 2020-01-01 VITALS
RESPIRATION RATE: 21 BRPM | BODY MASS INDEX: 24.66 KG/M2 | HEIGHT: 65 IN | WEIGHT: 148 LBS | TEMPERATURE: 98.1 F | DIASTOLIC BLOOD PRESSURE: 42 MMHG | SYSTOLIC BLOOD PRESSURE: 149 MMHG | OXYGEN SATURATION: 98 % | HEART RATE: 76 BPM

## 2020-01-01 VITALS
WEIGHT: 152 LBS | BODY MASS INDEX: 25.33 KG/M2 | TEMPERATURE: 97.6 F | HEIGHT: 65 IN | DIASTOLIC BLOOD PRESSURE: 65 MMHG | SYSTOLIC BLOOD PRESSURE: 116 MMHG

## 2020-01-01 VITALS
TEMPERATURE: 96.8 F | SYSTOLIC BLOOD PRESSURE: 143 MMHG | HEIGHT: 65 IN | DIASTOLIC BLOOD PRESSURE: 58 MMHG | WEIGHT: 151.7 LBS | RESPIRATION RATE: 16 BRPM | OXYGEN SATURATION: 94 % | HEART RATE: 62 BPM | BODY MASS INDEX: 25.27 KG/M2

## 2020-01-01 VITALS
SYSTOLIC BLOOD PRESSURE: 132 MMHG | DIASTOLIC BLOOD PRESSURE: 57 MMHG | TEMPERATURE: 96.9 F | WEIGHT: 148 LBS | BODY MASS INDEX: 24.63 KG/M2 | RESPIRATION RATE: 14 BRPM | HEART RATE: 78 BPM | OXYGEN SATURATION: 100 %

## 2020-01-01 VITALS
DIASTOLIC BLOOD PRESSURE: 68 MMHG | SYSTOLIC BLOOD PRESSURE: 106 MMHG | TEMPERATURE: 97.6 F | RESPIRATION RATE: 11 BRPM | BODY MASS INDEX: 25.72 KG/M2 | HEIGHT: 65 IN | OXYGEN SATURATION: 100 % | HEART RATE: 68 BPM | WEIGHT: 154.4 LBS

## 2020-01-01 VITALS
HEIGHT: 65 IN | SYSTOLIC BLOOD PRESSURE: 122 MMHG | WEIGHT: 160 LBS | TEMPERATURE: 97.6 F | BODY MASS INDEX: 26.66 KG/M2 | DIASTOLIC BLOOD PRESSURE: 68 MMHG

## 2020-01-01 DIAGNOSIS — U07.1 COVID-19 VIRUS INFECTION: Primary | ICD-10-CM

## 2020-01-01 DIAGNOSIS — N18.9 ACUTE KIDNEY INJURY SUPERIMPOSED ON CHRONIC KIDNEY DISEASE (HCC): ICD-10-CM

## 2020-01-01 DIAGNOSIS — N17.9 ACUTE KIDNEY INJURY SUPERIMPOSED ON CHRONIC KIDNEY DISEASE (HCC): ICD-10-CM

## 2020-01-01 DIAGNOSIS — R09.02 HYPOXIA: ICD-10-CM

## 2020-01-01 LAB
-: ABNORMAL
-: ABNORMAL
-: NORMAL
-: NORMAL
ABO/RH: NORMAL
ABSOLUTE EOS #: 0 K/UL (ref 0–0.4)
ABSOLUTE EOS #: 0 K/UL (ref 0–0.44)
ABSOLUTE EOS #: 0 K/UL (ref 0–0.44)
ABSOLUTE EOS #: 0.1 K/UL (ref 0–0.4)
ABSOLUTE EOS #: 0.2 K/UL (ref 0–0.4)
ABSOLUTE IMMATURE GRANULOCYTE: 0 K/UL (ref 0–0.3)
ABSOLUTE IMMATURE GRANULOCYTE: 0 K/UL (ref 0–0.3)
ABSOLUTE IMMATURE GRANULOCYTE: ABNORMAL K/UL (ref 0–0.3)
ABSOLUTE LYMPH #: 0.47 K/UL (ref 1.1–3.7)
ABSOLUTE LYMPH #: 0.6 K/UL (ref 1–4.8)
ABSOLUTE LYMPH #: 0.78 K/UL (ref 1.1–3.7)
ABSOLUTE LYMPH #: 0.9 K/UL (ref 1–4.8)
ABSOLUTE LYMPH #: 0.9 K/UL (ref 1–4.8)
ABSOLUTE LYMPH #: 1.1 K/UL (ref 1–4.8)
ABSOLUTE LYMPH #: 1.2 K/UL (ref 1–4.8)
ABSOLUTE MONO #: 0.13 K/UL (ref 0.1–1.2)
ABSOLUTE MONO #: 0.2 K/UL (ref 0–1)
ABSOLUTE MONO #: 0.3 K/UL (ref 0–1)
ABSOLUTE MONO #: 0.36 K/UL (ref 0.1–1.2)
ABSOLUTE MONO #: 0.4 K/UL (ref 0–1)
ABSOLUTE MONO #: 0.5 K/UL (ref 0–1)
ABSOLUTE MONO #: 0.5 K/UL (ref 0–1)
ALBUMIN SERPL-MCNC: 2.4 G/DL (ref 3.5–5.2)
ALBUMIN SERPL-MCNC: 2.5 G/DL (ref 3.5–5.2)
ALBUMIN SERPL-MCNC: 2.8 G/DL (ref 3.5–5.2)
ALBUMIN SERPL-MCNC: 2.9 G/DL (ref 3.5–5.2)
ALBUMIN SERPL-MCNC: 3.3 G/DL (ref 3.5–5.2)
ALBUMIN SERPL-MCNC: 3.4 G/DL (ref 3.5–5.2)
ALBUMIN SERPL-MCNC: 3.6 G/DL (ref 3.5–5.2)
ALBUMIN SERPL-MCNC: 3.7 G/DL (ref 3.5–5.2)
ALBUMIN/GLOBULIN RATIO: 0.6 (ref 1–2.5)
ALBUMIN/GLOBULIN RATIO: ABNORMAL (ref 1–2.5)
ALP BLD-CCNC: 124 U/L (ref 40–129)
ALP BLD-CCNC: 143 U/L (ref 40–129)
ALP BLD-CCNC: 168 U/L (ref 40–129)
ALP BLD-CCNC: 177 U/L (ref 40–129)
ALP BLD-CCNC: 234 U/L (ref 40–129)
ALP BLD-CCNC: 254 U/L (ref 40–129)
ALP BLD-CCNC: 300 U/L (ref 40–129)
ALP BLD-CCNC: 97 U/L (ref 40–129)
ALT SERPL-CCNC: 11 U/L (ref 5–41)
ALT SERPL-CCNC: 13 U/L (ref 5–41)
ALT SERPL-CCNC: 14 U/L (ref 5–41)
ALT SERPL-CCNC: 14 U/L (ref 5–41)
ALT SERPL-CCNC: 15 U/L (ref 5–41)
ALT SERPL-CCNC: 19 U/L (ref 5–41)
ALT SERPL-CCNC: 21 U/L (ref 5–41)
ALT SERPL-CCNC: 23 U/L (ref 5–41)
AMORPHOUS: ABNORMAL
AMORPHOUS: ABNORMAL
AMORPHOUS: NORMAL
AMORPHOUS: NORMAL
ANION GAP SERPL CALCULATED.3IONS-SCNC: 10 MMOL/L (ref 9–17)
ANION GAP SERPL CALCULATED.3IONS-SCNC: 10 MMOL/L (ref 9–17)
ANION GAP SERPL CALCULATED.3IONS-SCNC: 11 MMOL/L (ref 9–17)
ANION GAP SERPL CALCULATED.3IONS-SCNC: 11 MMOL/L (ref 9–17)
ANION GAP SERPL CALCULATED.3IONS-SCNC: 12 MMOL/L (ref 9–17)
ANION GAP SERPL CALCULATED.3IONS-SCNC: 13 MMOL/L (ref 9–17)
ANION GAP SERPL CALCULATED.3IONS-SCNC: 14 MMOL/L (ref 9–17)
ANION GAP SERPL CALCULATED.3IONS-SCNC: 14 MMOL/L (ref 9–17)
ANION GAP SERPL CALCULATED.3IONS-SCNC: 16 MMOL/L (ref 9–17)
ANTIBODY SCREEN: NEGATIVE
ARM BAND NUMBER: NORMAL
AST SERPL-CCNC: 21 U/L
AST SERPL-CCNC: 21 U/L
AST SERPL-CCNC: 29 U/L
AST SERPL-CCNC: 37 U/L
AST SERPL-CCNC: 40 U/L
AST SERPL-CCNC: 42 U/L
AST SERPL-CCNC: 44 U/L
AST SERPL-CCNC: 64 U/L
ATYPICAL LYMPHOCYTE ABSOLUTE COUNT: 0.04 K/UL
ATYPICAL LYMPHOCYTES: 1 %
BACTERIA: ABNORMAL
BACTERIA: ABNORMAL
BACTERIA: NORMAL
BACTERIA: NORMAL
BASOPHILS # BLD: 0 % (ref 0–2)
BASOPHILS ABSOLUTE: 0 K/UL (ref 0–0.2)
BILIRUB SERPL-MCNC: 0.25 MG/DL (ref 0.3–1.2)
BILIRUB SERPL-MCNC: 0.26 MG/DL (ref 0.3–1.2)
BILIRUB SERPL-MCNC: 0.3 MG/DL (ref 0.3–1.2)
BILIRUB SERPL-MCNC: 0.3 MG/DL (ref 0.3–1.2)
BILIRUB SERPL-MCNC: 0.39 MG/DL (ref 0.3–1.2)
BILIRUB SERPL-MCNC: 0.42 MG/DL (ref 0.3–1.2)
BILIRUB SERPL-MCNC: 0.49 MG/DL (ref 0.3–1.2)
BILIRUB SERPL-MCNC: 0.5 MG/DL (ref 0.3–1.2)
BILIRUBIN DIRECT: 0.12 MG/DL
BILIRUBIN URINE: ABNORMAL
BILIRUBIN URINE: ABNORMAL
BILIRUBIN URINE: NEGATIVE
BILIRUBIN URINE: NEGATIVE
BILIRUBIN, INDIRECT: 0.18 MG/DL (ref 0–1)
BLD PROD TYP BPU: NORMAL
BLD PROD TYP BPU: NORMAL
BNP INTERPRETATION: ABNORMAL
BUN BLDV-MCNC: 37 MG/DL (ref 8–23)
BUN BLDV-MCNC: 38 MG/DL (ref 8–23)
BUN BLDV-MCNC: 38 MG/DL (ref 8–23)
BUN BLDV-MCNC: 39 MG/DL (ref 8–23)
BUN BLDV-MCNC: 44 MG/DL (ref 8–23)
BUN BLDV-MCNC: 46 MG/DL (ref 8–23)
BUN BLDV-MCNC: 51 MG/DL (ref 8–23)
BUN/CREAT BLD: 19 (ref 9–20)
BUN/CREAT BLD: 22 (ref 9–20)
BUN/CREAT BLD: 24 (ref 9–20)
BUN/CREAT BLD: 25 (ref 9–20)
BUN/CREAT BLD: 26 (ref 9–20)
BUN/CREAT BLD: 26 (ref 9–20)
BUN/CREAT BLD: ABNORMAL (ref 9–20)
C-REACTIVE PROTEIN: 52.7 MG/L (ref 0–5)
CALCIUM SERPL-MCNC: 8.1 MG/DL (ref 8.6–10.4)
CALCIUM SERPL-MCNC: 8.6 MG/DL (ref 8.6–10.4)
CALCIUM SERPL-MCNC: 8.8 MG/DL (ref 8.6–10.4)
CALCIUM SERPL-MCNC: 8.8 MG/DL (ref 8.6–10.4)
CALCIUM SERPL-MCNC: 8.9 MG/DL (ref 8.6–10.4)
CALCIUM SERPL-MCNC: 9.4 MG/DL (ref 8.6–10.4)
CALCIUM SERPL-MCNC: 9.5 MG/DL (ref 8.6–10.4)
CALCIUM SERPL-MCNC: 9.5 MG/DL (ref 8.6–10.4)
CALCIUM SERPL-MCNC: 9.7 MG/DL (ref 8.6–10.4)
CASTS UA: ABNORMAL /LPF
CASTS UA: ABNORMAL /LPF
CASTS UA: NORMAL /LPF
CASTS UA: NORMAL /LPF
CHLORIDE BLD-SCNC: 104 MMOL/L (ref 98–107)
CHLORIDE BLD-SCNC: 105 MMOL/L (ref 98–107)
CHLORIDE BLD-SCNC: 105 MMOL/L (ref 98–107)
CHLORIDE BLD-SCNC: 107 MMOL/L (ref 98–107)
CHLORIDE BLD-SCNC: 108 MMOL/L (ref 98–107)
CHLORIDE BLD-SCNC: 109 MMOL/L (ref 98–107)
CHLORIDE BLD-SCNC: 109 MMOL/L (ref 98–107)
CHLORIDE BLD-SCNC: 110 MMOL/L (ref 98–107)
CHLORIDE BLD-SCNC: 112 MMOL/L (ref 98–107)
CHP ED QC CHECK: YES
CO2: 18 MMOL/L (ref 20–31)
CO2: 19 MMOL/L (ref 20–31)
CO2: 19 MMOL/L (ref 20–31)
CO2: 20 MMOL/L (ref 20–31)
CO2: 21 MMOL/L (ref 20–31)
CO2: 23 MMOL/L (ref 20–31)
COLOR: ABNORMAL
COLOR: YELLOW
COMMENT UA: ABNORMAL
CREAT SERPL-MCNC: 1.49 MG/DL (ref 0.7–1.2)
CREAT SERPL-MCNC: 1.5 MG/DL (ref 0.7–1.2)
CREAT SERPL-MCNC: 1.55 MG/DL (ref 0.7–1.2)
CREAT SERPL-MCNC: 1.58 MG/DL (ref 0.7–1.2)
CREAT SERPL-MCNC: 1.65 MG/DL (ref 0.7–1.2)
CREAT SERPL-MCNC: 1.74 MG/DL (ref 0.7–1.2)
CREAT SERPL-MCNC: 1.97 MG/DL (ref 0.7–1.2)
CREAT SERPL-MCNC: 2.27 MG/DL (ref 0.7–1.2)
CREAT SERPL-MCNC: 2.74 MG/DL (ref 0.7–1.2)
CROSSMATCH RESULT: NORMAL
CROSSMATCH RESULT: NORMAL
CRYSTALS, UA: ABNORMAL /HPF
CRYSTALS, UA: ABNORMAL /HPF
CRYSTALS, UA: NORMAL /HPF
CRYSTALS, UA: NORMAL /HPF
CULTURE: ABNORMAL
CULTURE: NORMAL
D-DIMER QUANTITATIVE: >20 MG/L FEU (ref 0–0.59)
DIFFERENTIAL TYPE: ABNORMAL
DIFFERENTIAL TYPE: YES
DISPENSE STATUS BLOOD BANK: NORMAL
DISPENSE STATUS BLOOD BANK: NORMAL
EKG ATRIAL RATE: 61 BPM
EKG ATRIAL RATE: 63 BPM
EKG ATRIAL RATE: 67 BPM
EKG ATRIAL RATE: 76 BPM
EKG ATRIAL RATE: 84 BPM
EKG P AXIS: 40 DEGREES
EKG P AXIS: 56 DEGREES
EKG P AXIS: 61 DEGREES
EKG P AXIS: 74 DEGREES
EKG P AXIS: 97 DEGREES
EKG P-R INTERVAL: 148 MS
EKG P-R INTERVAL: 160 MS
EKG P-R INTERVAL: 186 MS
EKG P-R INTERVAL: 188 MS
EKG P-R INTERVAL: 192 MS
EKG Q-T INTERVAL: 384 MS
EKG Q-T INTERVAL: 418 MS
EKG Q-T INTERVAL: 420 MS
EKG Q-T INTERVAL: 464 MS
EKG Q-T INTERVAL: 492 MS
EKG QRS DURATION: 84 MS
EKG QRS DURATION: 86 MS
EKG QRS DURATION: 90 MS
EKG QTC CALCULATION (BAZETT): 441 MS
EKG QTC CALCULATION (BAZETT): 453 MS
EKG QTC CALCULATION (BAZETT): 472 MS
EKG QTC CALCULATION (BAZETT): 474 MS
EKG QTC CALCULATION (BAZETT): 495 MS
EKG R AXIS: -42 DEGREES
EKG R AXIS: -46 DEGREES
EKG R AXIS: -46 DEGREES
EKG R AXIS: -62 DEGREES
EKG R AXIS: -64 DEGREES
EKG T AXIS: 38 DEGREES
EKG T AXIS: 55 DEGREES
EKG T AXIS: 59 DEGREES
EKG T AXIS: 61 DEGREES
EKG T AXIS: 83 DEGREES
EKG VENTRICULAR RATE: 61 BPM
EKG VENTRICULAR RATE: 63 BPM
EKG VENTRICULAR RATE: 67 BPM
EKG VENTRICULAR RATE: 76 BPM
EKG VENTRICULAR RATE: 84 BPM
EOSINOPHILS RELATIVE PERCENT: 0 % (ref 1–4)
EOSINOPHILS RELATIVE PERCENT: 0 % (ref 1–4)
EOSINOPHILS RELATIVE PERCENT: 1 % (ref 0–5)
EOSINOPHILS RELATIVE PERCENT: 2 % (ref 0–5)
EOSINOPHILS RELATIVE PERCENT: 3 % (ref 0–5)
EPITHELIAL CELLS UA: ABNORMAL /HPF
EPITHELIAL CELLS UA: ABNORMAL /HPF (ref 0–5)
EPITHELIAL CELLS UA: NORMAL /HPF
EPITHELIAL CELLS UA: NORMAL /HPF
EXPIRATION DATE: NORMAL
FERRITIN: 1153 UG/L (ref 30–400)
FERRITIN: 1335 UG/L (ref 30–400)
FERRITIN: 1525 UG/L (ref 30–400)
FERRITIN: 185 UG/L (ref 30–400)
FIBRINOGEN: 298 MG/DL (ref 179–518)
FIBRINOGEN: 381 MG/DL (ref 179–518)
FIBRINOGEN: 406 MG/DL (ref 179–518)
GFR AFRICAN AMERICAN: 27 ML/MIN
GFR AFRICAN AMERICAN: 33 ML/MIN
GFR AFRICAN AMERICAN: 39 ML/MIN
GFR AFRICAN AMERICAN: 45 ML/MIN
GFR AFRICAN AMERICAN: 48 ML/MIN
GFR AFRICAN AMERICAN: 51 ML/MIN
GFR AFRICAN AMERICAN: 52 ML/MIN
GFR AFRICAN AMERICAN: 54 ML/MIN
GFR AFRICAN AMERICAN: 54 ML/MIN
GFR NON-AFRICAN AMERICAN: 22 ML/MIN
GFR NON-AFRICAN AMERICAN: 28 ML/MIN
GFR NON-AFRICAN AMERICAN: 32 ML/MIN
GFR NON-AFRICAN AMERICAN: 37 ML/MIN
GFR NON-AFRICAN AMERICAN: 40 ML/MIN
GFR NON-AFRICAN AMERICAN: 42 ML/MIN
GFR NON-AFRICAN AMERICAN: 43 ML/MIN
GFR NON-AFRICAN AMERICAN: 44 ML/MIN
GFR NON-AFRICAN AMERICAN: 45 ML/MIN
GFR SERPL CREATININE-BSD FRML MDRD: ABNORMAL ML/MIN/{1.73_M2}
GLOBULIN: ABNORMAL G/DL (ref 1.5–3.8)
GLUCOSE BLD-MCNC: 103 MG/DL (ref 70–99)
GLUCOSE BLD-MCNC: 110 MG/DL (ref 70–99)
GLUCOSE BLD-MCNC: 120 MG/DL (ref 70–99)
GLUCOSE BLD-MCNC: 121 MG/DL (ref 70–99)
GLUCOSE BLD-MCNC: 122 MG/DL (ref 70–99)
GLUCOSE BLD-MCNC: 128 MG/DL (ref 70–99)
GLUCOSE BLD-MCNC: 152 MG/DL (ref 70–99)
GLUCOSE BLD-MCNC: 158 MG/DL (ref 70–99)
GLUCOSE BLD-MCNC: 187 MG/DL (ref 70–99)
GLUCOSE URINE: NEGATIVE
HCT VFR BLD CALC: 24.1 % (ref 41–53)
HCT VFR BLD CALC: 25.1 % (ref 41–53)
HCT VFR BLD CALC: 25.3 % (ref 41–53)
HCT VFR BLD CALC: 25.6 % (ref 40.7–50.3)
HCT VFR BLD CALC: 26.5 % (ref 41–53)
HCT VFR BLD CALC: 26.7 % (ref 41–53)
HCT VFR BLD CALC: 26.8 % (ref 40.7–50.3)
HCT VFR BLD CALC: 28.1 % (ref 40.7–50.3)
HCT VFR BLD CALC: 29.3 % (ref 40.7–50.3)
HCT VFR BLD CALC: 30.2 % (ref 40.7–50.3)
HCT VFR BLD CALC: 31.7 % (ref 41–53)
HEMOCCULT STL QL: NEGATIVE
HEMOGLOBIN: 10.3 G/DL (ref 13.5–17.5)
HEMOGLOBIN: 7.9 G/DL (ref 13.5–17.5)
HEMOGLOBIN: 8 G/DL (ref 13–17)
HEMOGLOBIN: 8.3 G/DL (ref 13.5–17.5)
HEMOGLOBIN: 8.3 G/DL (ref 13–17)
HEMOGLOBIN: 8.4 G/DL (ref 13.5–17.5)
HEMOGLOBIN: 8.7 G/DL (ref 13.5–17.5)
HEMOGLOBIN: 8.9 G/DL (ref 13.5–17.5)
HEMOGLOBIN: 8.9 G/DL (ref 13–17)
HEMOGLOBIN: 9 G/DL (ref 13–17)
HEMOGLOBIN: 9.5 G/DL (ref 13–17)
IMMATURE GRANULOCYTES: 0 %
IMMATURE GRANULOCYTES: 0 %
IMMATURE GRANULOCYTES: ABNORMAL %
INR BLD: 1.2
INR BLD: 1.2
INR BLD: 1.3
INR BLD: 1.3
INR BLD: 1.4
INR BLD: 1.7
KETONES, URINE: NEGATIVE
LACTATE DEHYDROGENASE: 200 U/L (ref 135–225)
LACTATE DEHYDROGENASE: 214 U/L (ref 135–225)
LACTIC ACID, WHOLE BLOOD: ABNORMAL MMOL/L (ref 0.7–2.1)
LACTIC ACID, WHOLE BLOOD: NORMAL MMOL/L (ref 0.7–2.1)
LACTIC ACID: 0.9 MMOL/L (ref 0.5–2.2)
LACTIC ACID: 1 MMOL/L (ref 0.5–2.2)
LACTIC ACID: 1.4 MMOL/L (ref 0.5–2.2)
LACTIC ACID: 3.2 MMOL/L (ref 0.5–2.2)
LEUKOCYTE ESTERASE, URINE: ABNORMAL
LEUKOCYTE ESTERASE, URINE: ABNORMAL
LEUKOCYTE ESTERASE, URINE: NEGATIVE
LEUKOCYTE ESTERASE, URINE: NEGATIVE
LIPASE: 7 U/L (ref 13–60)
LV EF: 65 %
LVEF MODALITY: NORMAL
LYMPHOCYTES # BLD: 11 % (ref 24–43)
LYMPHOCYTES # BLD: 13 % (ref 24–43)
LYMPHOCYTES # BLD: 14 % (ref 13–44)
LYMPHOCYTES # BLD: 15 % (ref 13–44)
LYMPHOCYTES # BLD: 20 % (ref 13–44)
LYMPHOCYTES # BLD: 21 % (ref 13–44)
LYMPHOCYTES # BLD: 32 % (ref 13–44)
Lab: ABNORMAL
Lab: ABNORMAL
Lab: NORMAL
MCH RBC QN AUTO: 29.3 PG (ref 26–34)
MCH RBC QN AUTO: 29.8 PG (ref 25.2–33.5)
MCH RBC QN AUTO: 29.9 PG (ref 25.2–33.5)
MCH RBC QN AUTO: 29.9 PG (ref 25.2–33.5)
MCH RBC QN AUTO: 29.9 PG (ref 26–34)
MCH RBC QN AUTO: 32 PG (ref 26–34)
MCH RBC QN AUTO: 32.4 PG (ref 26–34)
MCH RBC QN AUTO: 32.4 PG (ref 26–34)
MCH RBC QN AUTO: 32.5 PG (ref 25.2–33.5)
MCH RBC QN AUTO: 32.5 PG (ref 26–34)
MCH RBC QN AUTO: 33 PG (ref 25.2–33.5)
MCHC RBC AUTO-ENTMCNC: 30.7 G/DL (ref 28.4–34.8)
MCHC RBC AUTO-ENTMCNC: 31 G/DL (ref 28.4–34.8)
MCHC RBC AUTO-ENTMCNC: 31.3 G/DL (ref 28.4–34.8)
MCHC RBC AUTO-ENTMCNC: 31.5 G/DL (ref 28.4–34.8)
MCHC RBC AUTO-ENTMCNC: 31.7 G/DL (ref 28.4–34.8)
MCHC RBC AUTO-ENTMCNC: 32.4 G/DL (ref 31–37)
MCHC RBC AUTO-ENTMCNC: 32.9 G/DL (ref 31–37)
MCHC RBC AUTO-ENTMCNC: 33 G/DL (ref 31–37)
MCHC RBC AUTO-ENTMCNC: 33.1 G/DL (ref 31–37)
MCHC RBC AUTO-ENTMCNC: 33.2 G/DL (ref 31–37)
MCHC RBC AUTO-ENTMCNC: 33.3 G/DL (ref 31–37)
MCV RBC AUTO: 104.1 FL (ref 82.6–102.9)
MCV RBC AUTO: 104.1 FL (ref 82.6–102.9)
MCV RBC AUTO: 90.5 FL (ref 80–100)
MCV RBC AUTO: 90.7 FL (ref 80–100)
MCV RBC AUTO: 95 FL (ref 82.6–102.9)
MCV RBC AUTO: 96.4 FL (ref 82.6–102.9)
MCV RBC AUTO: 97 FL (ref 82.6–102.9)
MCV RBC AUTO: 97.3 FL (ref 80–100)
MCV RBC AUTO: 97.5 FL (ref 80–100)
MCV RBC AUTO: 97.6 FL (ref 80–100)
MCV RBC AUTO: 98.1 FL (ref 80–100)
MONOCYTES # BLD: 3 % (ref 3–12)
MONOCYTES # BLD: 6 % (ref 3–12)
MONOCYTES # BLD: 6 % (ref 5–9)
MONOCYTES # BLD: 8 % (ref 5–9)
MONOCYTES # BLD: 8 % (ref 5–9)
MONOCYTES # BLD: 9 % (ref 5–9)
MONOCYTES # BLD: 9 % (ref 5–9)
MORPHOLOGY: ABNORMAL
MORPHOLOGY: NORMAL
MUCUS: ABNORMAL
MUCUS: ABNORMAL
MUCUS: NORMAL
MUCUS: NORMAL
MYOGLOBIN: 53 NG/ML (ref 28–72)
MYOGLOBIN: 56 NG/ML (ref 28–72)
MYOGLOBIN: 57 NG/ML (ref 28–72)
NITRITE, URINE: NEGATIVE
NITRITE, URINE: NEGATIVE
NITRITE, URINE: POSITIVE
NITRITE, URINE: POSITIVE
NRBC AUTOMATED: 0 PER 100 WBC
NRBC AUTOMATED: ABNORMAL PER 100 WBC
OTHER OBSERVATIONS UA: ABNORMAL
OTHER OBSERVATIONS UA: ABNORMAL
OTHER OBSERVATIONS UA: NORMAL
OTHER OBSERVATIONS UA: NORMAL
PARTIAL THROMBOPLASTIN TIME: 107.8 SEC (ref 23–31)
PARTIAL THROMBOPLASTIN TIME: 28.1 SEC (ref 21–33)
PARTIAL THROMBOPLASTIN TIME: 32.4 SEC (ref 23–31)
PARTIAL THROMBOPLASTIN TIME: 37.5 SEC (ref 23–31)
PARTIAL THROMBOPLASTIN TIME: 42.7 SEC (ref 23.9–33.8)
PARTIAL THROMBOPLASTIN TIME: 43.4 SEC (ref 23.9–33.8)
PARTIAL THROMBOPLASTIN TIME: 46.2 SEC (ref 23.9–33.8)
PARTIAL THROMBOPLASTIN TIME: >120 SEC (ref 23–31)
PDW BLD-RTO: 13.1 % (ref 11.8–14.4)
PDW BLD-RTO: 13.1 % (ref 11.8–14.4)
PDW BLD-RTO: 13.2 % (ref 12.1–15.2)
PDW BLD-RTO: 13.4 % (ref 12.1–15.2)
PDW BLD-RTO: 13.5 % (ref 12.1–15.2)
PDW BLD-RTO: 13.7 % (ref 12.1–15.2)
PDW BLD-RTO: 16.7 % (ref 11.8–14.4)
PDW BLD-RTO: 16.9 % (ref 11.8–14.4)
PDW BLD-RTO: 17 % (ref 12.1–15.2)
PDW BLD-RTO: 17.1 % (ref 11.8–14.4)
PDW BLD-RTO: 18.7 % (ref 12.1–15.2)
PH UA: 5 (ref 5–8)
PH UA: 5 (ref 5–8)
PH UA: 6 (ref 5–9)
PH UA: 7 (ref 5–8)
PLATELET # BLD: 108 K/UL (ref 138–453)
PLATELET # BLD: 117 K/UL (ref 138–453)
PLATELET # BLD: 126 K/UL (ref 140–450)
PLATELET # BLD: 128 K/UL (ref 138–453)
PLATELET # BLD: 146 K/UL (ref 138–453)
PLATELET # BLD: 190 K/UL (ref 140–450)
PLATELET # BLD: 194 K/UL (ref 140–450)
PLATELET # BLD: 195 K/UL (ref 140–450)
PLATELET # BLD: 208 K/UL (ref 140–450)
PLATELET # BLD: 210 K/UL (ref 140–450)
PLATELET # BLD: ABNORMAL K/UL (ref 138–453)
PLATELET ESTIMATE: ABNORMAL
PLATELET, FLUORESCENCE: 95 K/UL (ref 138–453)
PLATELET, IMMATURE FRACTION: 0.9 % (ref 1.1–10.3)
PMV BLD AUTO: 8.3 FL (ref 8.1–13.5)
PMV BLD AUTO: 9.1 FL (ref 8.1–13.5)
PMV BLD AUTO: 9.1 FL (ref 8.1–13.5)
PMV BLD AUTO: 9.5 FL (ref 8.1–13.5)
PMV BLD AUTO: ABNORMAL FL (ref 6–12)
PMV BLD AUTO: ABNORMAL FL (ref 8.1–13.5)
POTASSIUM SERPL-SCNC: 4.1 MMOL/L (ref 3.7–5.3)
POTASSIUM SERPL-SCNC: 4.2 MMOL/L (ref 3.7–5.3)
POTASSIUM SERPL-SCNC: 4.4 MMOL/L (ref 3.7–5.3)
POTASSIUM SERPL-SCNC: 4.6 MMOL/L (ref 3.7–5.3)
POTASSIUM SERPL-SCNC: 4.7 MMOL/L (ref 3.7–5.3)
POTASSIUM SERPL-SCNC: 4.8 MMOL/L (ref 3.7–5.3)
POTASSIUM SERPL-SCNC: 5 MMOL/L (ref 3.7–5.3)
PRO-BNP: 724 PG/ML
PROCALCITONIN: 0.12 NG/ML
PROTEIN UA: ABNORMAL
PROTHROMBIN TIME: 12 SEC (ref 9–11.6)
PROTHROMBIN TIME: 12.1 SEC (ref 9–11.6)
PROTHROMBIN TIME: 13.8 SEC (ref 9.7–11.6)
PROTHROMBIN TIME: 15.7 SEC (ref 11.5–14.2)
PROTHROMBIN TIME: 15.8 SEC (ref 11.5–14.2)
PROTHROMBIN TIME: 19.7 SEC (ref 11.5–14.2)
RBC # BLD: 2.46 M/UL (ref 4.21–5.77)
RBC # BLD: 2.47 M/UL (ref 4.5–5.9)
RBC # BLD: 2.57 M/UL (ref 4.5–5.9)
RBC # BLD: 2.6 M/UL (ref 4.5–5.9)
RBC # BLD: 2.7 M/UL (ref 4.21–5.77)
RBC # BLD: 2.73 M/UL (ref 4.5–5.9)
RBC # BLD: 2.78 M/UL (ref 4.21–5.77)
RBC # BLD: 2.92 M/UL (ref 4.5–5.9)
RBC # BLD: 3.02 M/UL (ref 4.21–5.77)
RBC # BLD: 3.18 M/UL (ref 4.21–5.77)
RBC # BLD: 3.51 M/UL (ref 4.5–5.9)
RBC # BLD: ABNORMAL 10*6/UL
RBC UA: ABNORMAL /HPF (ref 0–2)
RBC UA: ABNORMAL /HPF (ref 0–2)
RBC UA: NORMAL /HPF (ref 0–2)
RBC UA: NORMAL /HPF (ref 0–2)
RENAL EPITHELIAL, UA: ABNORMAL /HPF
RENAL EPITHELIAL, UA: ABNORMAL /HPF
RENAL EPITHELIAL, UA: NORMAL /HPF
RENAL EPITHELIAL, UA: NORMAL /HPF
SARS-COV-2, RAPID: DETECTED
SARS-COV-2: ABNORMAL
SARS-COV-2: ABNORMAL
SEDIMENTATION RATE, ERYTHROCYTE: 45 MM (ref 0–10)
SEG NEUTROPHILS: 61 % (ref 39–75)
SEG NEUTROPHILS: 68 % (ref 39–75)
SEG NEUTROPHILS: 70 % (ref 39–75)
SEG NEUTROPHILS: 74 % (ref 39–75)
SEG NEUTROPHILS: 75 % (ref 39–75)
SEG NEUTROPHILS: 81 % (ref 36–65)
SEG NEUTROPHILS: 85 % (ref 36–65)
SEGMENTED NEUTROPHILS ABSOLUTE COUNT: 2.2 K/UL (ref 2.1–6.5)
SEGMENTED NEUTROPHILS ABSOLUTE COUNT: 3 K/UL (ref 2.1–6.5)
SEGMENTED NEUTROPHILS ABSOLUTE COUNT: 3.5 K/UL (ref 2.1–6.5)
SEGMENTED NEUTROPHILS ABSOLUTE COUNT: 3.6 K/UL (ref 2.1–6.5)
SEGMENTED NEUTROPHILS ABSOLUTE COUNT: 3.66 K/UL (ref 1.5–8.1)
SEGMENTED NEUTROPHILS ABSOLUTE COUNT: 4.4 K/UL (ref 2.1–6.5)
SEGMENTED NEUTROPHILS ABSOLUTE COUNT: 4.86 K/UL (ref 1.5–8.1)
SODIUM BLD-SCNC: 137 MMOL/L (ref 135–144)
SODIUM BLD-SCNC: 137 MMOL/L (ref 135–144)
SODIUM BLD-SCNC: 138 MMOL/L (ref 135–144)
SODIUM BLD-SCNC: 139 MMOL/L (ref 135–144)
SODIUM BLD-SCNC: 141 MMOL/L (ref 135–144)
SODIUM BLD-SCNC: 141 MMOL/L (ref 135–144)
SODIUM BLD-SCNC: 142 MMOL/L (ref 135–144)
SODIUM BLD-SCNC: 142 MMOL/L (ref 135–144)
SODIUM BLD-SCNC: 143 MMOL/L (ref 135–144)
SOURCE: ABNORMAL
SPECIFIC GRAVITY UA: 1 (ref 1–1.03)
SPECIFIC GRAVITY UA: 1.01 (ref 1–1.03)
SPECIFIC GRAVITY UA: 1.02 (ref 1.01–1.02)
SPECIFIC GRAVITY UA: 1.02 (ref 1–1.03)
SPECIMEN DESCRIPTION: ABNORMAL
SPECIMEN DESCRIPTION: ABNORMAL
SPECIMEN DESCRIPTION: NORMAL
TOTAL PROTEIN: 5.8 G/DL (ref 6.4–8.3)
TOTAL PROTEIN: 6.4 G/DL (ref 6.4–8.3)
TOTAL PROTEIN: 6.5 G/DL (ref 6.4–8.3)
TOTAL PROTEIN: 6.6 G/DL (ref 6.4–8.3)
TOTAL PROTEIN: 6.8 G/DL (ref 6.4–8.3)
TOTAL PROTEIN: 7.2 G/DL (ref 6.4–8.3)
TOTAL PROTEIN: 7.2 G/DL (ref 6.4–8.3)
TOTAL PROTEIN: 7.6 G/DL (ref 6.4–8.3)
TRANSFUSION STATUS: NORMAL
TRANSFUSION STATUS: NORMAL
TRICHOMONAS: ABNORMAL
TRICHOMONAS: ABNORMAL
TRICHOMONAS: NORMAL
TRICHOMONAS: NORMAL
TROPONIN INTERP: ABNORMAL
TROPONIN T: 0.11 NG/ML
TROPONIN T: 0.12 NG/ML
TROPONIN T: 0.15 NG/ML
TROPONIN T: 0.17 NG/ML
TROPONIN T: ABNORMAL NG/ML
TROPONIN, HIGH SENSITIVITY: 108 NG/L (ref 0–22)
TROPONIN, HIGH SENSITIVITY: 113 NG/L (ref 0–22)
TROPONIN, HIGH SENSITIVITY: 116 NG/L (ref 0–22)
TROPONIN, HIGH SENSITIVITY: ABNORMAL NG/L (ref 0–22)
TURBIDITY: ABNORMAL
TURBIDITY: ABNORMAL
TURBIDITY: CLEAR
TURBIDITY: CLEAR
UNIT DIVISION: 0
UNIT DIVISION: 0
UNIT NUMBER: NORMAL
UNIT NUMBER: NORMAL
URINE HGB: ABNORMAL
UROBILINOGEN, URINE: NORMAL
WBC # BLD: 3.4 K/UL (ref 3.5–11.3)
WBC # BLD: 3.6 K/UL (ref 3.5–11)
WBC # BLD: 4.3 K/UL (ref 3.5–11)
WBC # BLD: 4.3 K/UL (ref 3.5–11.3)
WBC # BLD: 4.6 K/UL (ref 3.5–11)
WBC # BLD: 4.8 K/UL (ref 3.5–11)
WBC # BLD: 5 K/UL (ref 3.5–11.3)
WBC # BLD: 5 K/UL (ref 3.5–11.3)
WBC # BLD: 5.3 K/UL (ref 3.5–11)
WBC # BLD: 5.9 K/UL (ref 3.5–11)
WBC # BLD: 6 K/UL (ref 3.5–11.3)
WBC # BLD: ABNORMAL 10*3/UL
WBC UA: ABNORMAL /HPF
WBC UA: ABNORMAL /HPF (ref 0–5)
WBC UA: NORMAL /HPF
WBC UA: NORMAL /HPF
YEAST: ABNORMAL
YEAST: ABNORMAL
YEAST: NORMAL
YEAST: NORMAL

## 2020-01-01 PROCEDURE — 84145 PROCALCITONIN (PCT): CPT

## 2020-01-01 PROCEDURE — 80053 COMPREHEN METABOLIC PANEL: CPT

## 2020-01-01 PROCEDURE — 85027 COMPLETE CBC AUTOMATED: CPT

## 2020-01-01 PROCEDURE — 86140 C-REACTIVE PROTEIN: CPT

## 2020-01-01 PROCEDURE — 83615 LACTATE (LD) (LDH) ENZYME: CPT

## 2020-01-01 PROCEDURE — 4040F PNEUMOC VAC/ADMIN/RCVD: CPT | Performed by: UROLOGY

## 2020-01-01 PROCEDURE — 96365 THER/PROPH/DIAG IV INF INIT: CPT

## 2020-01-01 PROCEDURE — 85610 PROTHROMBIN TIME: CPT

## 2020-01-01 PROCEDURE — G8420 CALC BMI NORM PARAMETERS: HCPCS | Performed by: NURSE PRACTITIONER

## 2020-01-01 PROCEDURE — 1036F TOBACCO NON-USER: CPT | Performed by: UROLOGY

## 2020-01-01 PROCEDURE — 36415 COLL VENOUS BLD VENIPUNCTURE: CPT

## 2020-01-01 PROCEDURE — 99284 EMERGENCY DEPT VISIT MOD MDM: CPT

## 2020-01-01 PROCEDURE — P9016 RBC LEUKOCYTES REDUCED: HCPCS

## 2020-01-01 PROCEDURE — 93010 ELECTROCARDIOGRAM REPORT: CPT | Performed by: INTERNAL MEDICINE

## 2020-01-01 PROCEDURE — 85730 THROMBOPLASTIN TIME PARTIAL: CPT

## 2020-01-01 PROCEDURE — 85025 COMPLETE CBC W/AUTO DIFF WBC: CPT

## 2020-01-01 PROCEDURE — 99285 EMERGENCY DEPT VISIT HI MDM: CPT

## 2020-01-01 PROCEDURE — 86850 RBC ANTIBODY SCREEN: CPT

## 2020-01-01 PROCEDURE — 83605 ASSAY OF LACTIC ACID: CPT

## 2020-01-01 PROCEDURE — 87040 BLOOD CULTURE FOR BACTERIA: CPT

## 2020-01-01 PROCEDURE — 85379 FIBRIN DEGRADATION QUANT: CPT

## 2020-01-01 PROCEDURE — 2500000003 HC RX 250 WO HCPCS: Performed by: INTERNAL MEDICINE

## 2020-01-01 PROCEDURE — 86900 BLOOD TYPING SEROLOGIC ABO: CPT

## 2020-01-01 PROCEDURE — 97535 SELF CARE MNGMENT TRAINING: CPT

## 2020-01-01 PROCEDURE — 81001 URINALYSIS AUTO W/SCOPE: CPT

## 2020-01-01 PROCEDURE — 85384 FIBRINOGEN ACTIVITY: CPT

## 2020-01-01 PROCEDURE — 85651 RBC SED RATE NONAUTOMATED: CPT

## 2020-01-01 PROCEDURE — 86920 COMPATIBILITY TEST SPIN: CPT

## 2020-01-01 PROCEDURE — 93005 ELECTROCARDIOGRAM TRACING: CPT | Performed by: FAMILY MEDICINE

## 2020-01-01 PROCEDURE — 99214 OFFICE O/P EST MOD 30 MIN: CPT | Performed by: NURSE PRACTITIONER

## 2020-01-01 PROCEDURE — 6370000000 HC RX 637 (ALT 250 FOR IP): Performed by: INTERNAL MEDICINE

## 2020-01-01 PROCEDURE — G8427 DOCREV CUR MEDS BY ELIG CLIN: HCPCS | Performed by: NURSE PRACTITIONER

## 2020-01-01 PROCEDURE — 6360000002 HC RX W HCPCS: Performed by: FAMILY MEDICINE

## 2020-01-01 PROCEDURE — 72131 CT LUMBAR SPINE W/O DYE: CPT

## 2020-01-01 PROCEDURE — 94640 AIRWAY INHALATION TREATMENT: CPT

## 2020-01-01 PROCEDURE — 85055 RETICULATED PLATELET ASSAY: CPT

## 2020-01-01 PROCEDURE — 86901 BLOOD TYPING SEROLOGIC RH(D): CPT

## 2020-01-01 PROCEDURE — 1123F ACP DISCUSS/DSCN MKR DOCD: CPT | Performed by: NURSE PRACTITIONER

## 2020-01-01 PROCEDURE — 6370000000 HC RX 637 (ALT 250 FOR IP): Performed by: NURSE PRACTITIONER

## 2020-01-01 PROCEDURE — 87186 SC STD MICRODIL/AGAR DIL: CPT

## 2020-01-01 PROCEDURE — G8428 CUR MEDS NOT DOCUMENT: HCPCS | Performed by: NURSE PRACTITIONER

## 2020-01-01 PROCEDURE — 97110 THERAPEUTIC EXERCISES: CPT

## 2020-01-01 PROCEDURE — 1111F DSCHRG MED/CURRENT MED MERGE: CPT | Performed by: NURSE PRACTITIONER

## 2020-01-01 PROCEDURE — 82728 ASSAY OF FERRITIN: CPT

## 2020-01-01 PROCEDURE — 99211 OFF/OP EST MAY X REQ PHY/QHP: CPT | Performed by: UROLOGY

## 2020-01-01 PROCEDURE — 2580000003 HC RX 258: Performed by: INTERNAL MEDICINE

## 2020-01-01 PROCEDURE — 1200000000 HC SEMI PRIVATE

## 2020-01-01 PROCEDURE — 97166 OT EVAL MOD COMPLEX 45 MIN: CPT

## 2020-01-01 PROCEDURE — 84484 ASSAY OF TROPONIN QUANT: CPT

## 2020-01-01 PROCEDURE — 71045 X-RAY EXAM CHEST 1 VIEW: CPT

## 2020-01-01 PROCEDURE — 51798 US URINE CAPACITY MEASURE: CPT | Performed by: UROLOGY

## 2020-01-01 PROCEDURE — 1036F TOBACCO NON-USER: CPT | Performed by: NURSE PRACTITIONER

## 2020-01-01 PROCEDURE — 6360000002 HC RX W HCPCS: Performed by: INTERNAL MEDICINE

## 2020-01-01 PROCEDURE — 96374 THER/PROPH/DIAG INJ IV PUSH: CPT

## 2020-01-01 PROCEDURE — 80076 HEPATIC FUNCTION PANEL: CPT

## 2020-01-01 PROCEDURE — 83690 ASSAY OF LIPASE: CPT

## 2020-01-01 PROCEDURE — 51702 INSERT TEMP BLADDER CATH: CPT

## 2020-01-01 PROCEDURE — 4040F PNEUMOC VAC/ADMIN/RCVD: CPT | Performed by: NURSE PRACTITIONER

## 2020-01-01 PROCEDURE — 6360000002 HC RX W HCPCS: Performed by: NURSE PRACTITIONER

## 2020-01-01 PROCEDURE — G8417 CALC BMI ABV UP PARAM F/U: HCPCS | Performed by: NURSE PRACTITIONER

## 2020-01-01 PROCEDURE — 94760 N-INVAS EAR/PLS OXIMETRY 1: CPT

## 2020-01-01 PROCEDURE — 93005 ELECTROCARDIOGRAM TRACING: CPT | Performed by: EMERGENCY MEDICINE

## 2020-01-01 PROCEDURE — 2580000003 HC RX 258: Performed by: NURSE PRACTITIONER

## 2020-01-01 PROCEDURE — 83874 ASSAY OF MYOGLOBIN: CPT

## 2020-01-01 PROCEDURE — 99223 1ST HOSP IP/OBS HIGH 75: CPT | Performed by: INTERNAL MEDICINE

## 2020-01-01 PROCEDURE — 99214 OFFICE O/P EST MOD 30 MIN: CPT | Performed by: UROLOGY

## 2020-01-01 PROCEDURE — 87086 URINE CULTURE/COLONY COUNT: CPT

## 2020-01-01 PROCEDURE — 93306 TTE W/DOPPLER COMPLETE: CPT

## 2020-01-01 PROCEDURE — G8427 DOCREV CUR MEDS BY ELIG CLIN: HCPCS | Performed by: UROLOGY

## 2020-01-01 PROCEDURE — 74018 RADEX ABDOMEN 1 VIEW: CPT

## 2020-01-01 PROCEDURE — 80048 BASIC METABOLIC PNL TOTAL CA: CPT

## 2020-01-01 PROCEDURE — XW033E5 INTRODUCTION OF REMDESIVIR ANTI-INFECTIVE INTO PERIPHERAL VEIN, PERCUTANEOUS APPROACH, NEW TECHNOLOGY GROUP 5: ICD-10-PCS | Performed by: INTERNAL MEDICINE

## 2020-01-01 PROCEDURE — 70450 CT HEAD/BRAIN W/O DYE: CPT

## 2020-01-01 PROCEDURE — 87077 CULTURE AEROBIC IDENTIFY: CPT

## 2020-01-01 PROCEDURE — 83880 ASSAY OF NATRIURETIC PEPTIDE: CPT

## 2020-01-01 PROCEDURE — 97162 PT EVAL MOD COMPLEX 30 MIN: CPT

## 2020-01-01 PROCEDURE — 2060000000 HC ICU INTERMEDIATE R&B

## 2020-01-01 PROCEDURE — 6370000000 HC RX 637 (ALT 250 FOR IP): Performed by: FAMILY MEDICINE

## 2020-01-01 PROCEDURE — U0002 COVID-19 LAB TEST NON-CDC: HCPCS

## 2020-01-01 PROCEDURE — 99291 CRITICAL CARE FIRST HOUR: CPT

## 2020-01-01 PROCEDURE — 99211 OFF/OP EST MAY X REQ PHY/QHP: CPT | Performed by: NURSE PRACTITIONER

## 2020-01-01 PROCEDURE — 1123F ACP DISCUSS/DSCN MKR DOCD: CPT | Performed by: UROLOGY

## 2020-01-01 PROCEDURE — 36430 TRANSFUSION BLD/BLD COMPNT: CPT

## 2020-01-01 PROCEDURE — 51798 US URINE CAPACITY MEASURE: CPT | Performed by: NURSE PRACTITIONER

## 2020-01-01 PROCEDURE — 97530 THERAPEUTIC ACTIVITIES: CPT

## 2020-01-01 PROCEDURE — 86403 PARTICLE AGGLUT ANTBDY SCRN: CPT

## 2020-01-01 PROCEDURE — 87088 URINE BACTERIA CULTURE: CPT

## 2020-01-01 PROCEDURE — 2580000003 HC RX 258: Performed by: FAMILY MEDICINE

## 2020-01-01 PROCEDURE — C9803 HOPD COVID-19 SPEC COLLECT: HCPCS

## 2020-01-01 PROCEDURE — 99239 HOSP IP/OBS DSCHRG MGMT >30: CPT | Performed by: INTERNAL MEDICINE

## 2020-01-01 PROCEDURE — 2500000003 HC RX 250 WO HCPCS: Performed by: NURSE PRACTITIONER

## 2020-01-01 PROCEDURE — G8417 CALC BMI ABV UP PARAM F/U: HCPCS | Performed by: UROLOGY

## 2020-01-01 RX ORDER — ACETAMINOPHEN 650 MG/1
650 SUPPOSITORY RECTAL EVERY 6 HOURS PRN
Status: DISCONTINUED | OUTPATIENT
Start: 2020-01-01 | End: 2020-01-01 | Stop reason: SDUPTHER

## 2020-01-01 RX ORDER — DEXAMETHASONE SODIUM PHOSPHATE 4 MG/ML
6 INJECTION, SOLUTION INTRA-ARTICULAR; INTRALESIONAL; INTRAMUSCULAR; INTRAVENOUS; SOFT TISSUE DAILY
Status: DISCONTINUED | OUTPATIENT
Start: 2020-01-01 | End: 2020-01-01 | Stop reason: HOSPADM

## 2020-01-01 RX ORDER — TAMSULOSIN HYDROCHLORIDE 0.4 MG/1
0.4 CAPSULE ORAL EVERY EVENING
Status: DISCONTINUED | OUTPATIENT
Start: 2020-01-01 | End: 2020-01-01 | Stop reason: HOSPADM

## 2020-01-01 RX ORDER — SODIUM CHLORIDE 0.9 % (FLUSH) 0.9 %
10 SYRINGE (ML) INJECTION EVERY 12 HOURS SCHEDULED
Status: DISCONTINUED | OUTPATIENT
Start: 2020-01-01 | End: 2020-01-01 | Stop reason: HOSPADM

## 2020-01-01 RX ORDER — HEPARIN SODIUM 1000 [USP'U]/ML
4000 INJECTION, SOLUTION INTRAVENOUS; SUBCUTANEOUS ONCE
Status: COMPLETED | OUTPATIENT
Start: 2020-01-01 | End: 2020-01-01

## 2020-01-01 RX ORDER — CARVEDILOL 3.12 MG/1
3.12 TABLET ORAL 2 TIMES DAILY
Status: DISCONTINUED | OUTPATIENT
Start: 2020-01-01 | End: 2020-01-01 | Stop reason: HOSPADM

## 2020-01-01 RX ORDER — ACETAMINOPHEN 500 MG
1000 TABLET ORAL EVERY 8 HOURS PRN
Status: DISCONTINUED | OUTPATIENT
Start: 2020-01-01 | End: 2020-01-01 | Stop reason: DRUGHIGH

## 2020-01-01 RX ORDER — FUROSEMIDE 10 MG/ML
20 INJECTION INTRAMUSCULAR; INTRAVENOUS ONCE
Status: CANCELLED | OUTPATIENT
Start: 2020-01-01 | End: 2020-01-01

## 2020-01-01 RX ORDER — ACETAMINOPHEN 650 MG/1
650 SUPPOSITORY RECTAL EVERY 6 HOURS PRN
Status: DISCONTINUED | OUTPATIENT
Start: 2020-01-01 | End: 2020-01-01 | Stop reason: HOSPADM

## 2020-01-01 RX ORDER — SODIUM CHLORIDE 9 MG/ML
INJECTION, SOLUTION INTRAVENOUS CONTINUOUS
Status: DISCONTINUED | OUTPATIENT
Start: 2020-01-01 | End: 2020-01-01 | Stop reason: HOSPADM

## 2020-01-01 RX ORDER — ALBUTEROL SULFATE 90 UG/1
2 AEROSOL, METERED RESPIRATORY (INHALATION) 4 TIMES DAILY
Status: DISCONTINUED | OUTPATIENT
Start: 2020-01-01 | End: 2020-01-01 | Stop reason: HOSPADM

## 2020-01-01 RX ORDER — SODIUM CHLORIDE 0.9 % (FLUSH) 0.9 %
10 SYRINGE (ML) INJECTION PRN
Status: DISCONTINUED | OUTPATIENT
Start: 2020-01-01 | End: 2020-01-01 | Stop reason: HOSPADM

## 2020-01-01 RX ORDER — FINASTERIDE 5 MG/1
5 TABLET, FILM COATED ORAL DAILY
Status: DISCONTINUED | OUTPATIENT
Start: 2020-01-01 | End: 2020-01-01 | Stop reason: HOSPADM

## 2020-01-01 RX ORDER — SENNA PLUS 8.6 MG/1
1 TABLET ORAL 2 TIMES DAILY
Status: ON HOLD | COMMUNITY
End: 2020-01-01 | Stop reason: HOSPADM

## 2020-01-01 RX ORDER — ATORVASTATIN CALCIUM 20 MG/1
20 TABLET, FILM COATED ORAL NIGHTLY
Status: DISCONTINUED | OUTPATIENT
Start: 2020-01-01 | End: 2020-01-01 | Stop reason: HOSPADM

## 2020-01-01 RX ORDER — OXYCODONE HYDROCHLORIDE AND ACETAMINOPHEN 5; 325 MG/1; MG/1
1 TABLET ORAL NIGHTLY
Status: ON HOLD | COMMUNITY
End: 2020-01-01 | Stop reason: HOSPADM

## 2020-01-01 RX ORDER — POTASSIUM CHLORIDE 750 MG/1
20 CAPSULE, EXTENDED RELEASE ORAL DAILY
Status: DISCONTINUED | OUTPATIENT
Start: 2020-01-01 | End: 2020-01-01 | Stop reason: HOSPADM

## 2020-01-01 RX ORDER — ATORVASTATIN CALCIUM 20 MG/1
20 TABLET, FILM COATED ORAL NIGHTLY
COMMUNITY

## 2020-01-01 RX ORDER — PANTOPRAZOLE SODIUM 40 MG/1
20 TABLET, DELAYED RELEASE ORAL 2 TIMES DAILY
COMMUNITY
Start: 2020-01-01

## 2020-01-01 RX ORDER — ONDANSETRON 2 MG/ML
4 INJECTION INTRAMUSCULAR; INTRAVENOUS EVERY 6 HOURS PRN
Status: DISCONTINUED | OUTPATIENT
Start: 2020-01-01 | End: 2020-01-01 | Stop reason: HOSPADM

## 2020-01-01 RX ORDER — OXYCODONE HYDROCHLORIDE AND ACETAMINOPHEN 5; 325 MG/1; MG/1
1 TABLET ORAL EVERY 6 HOURS PRN
Status: ON HOLD | COMMUNITY
Start: 2020-01-01 | End: 2020-01-01 | Stop reason: SDUPTHER

## 2020-01-01 RX ORDER — CIPROFLOXACIN 250 MG/1
250 TABLET, FILM COATED ORAL 2 TIMES DAILY
Qty: 10 TABLET | Refills: 0 | Status: SHIPPED | OUTPATIENT
Start: 2020-01-01 | End: 2020-01-01

## 2020-01-01 RX ORDER — FUROSEMIDE 10 MG/ML
20 INJECTION INTRAMUSCULAR; INTRAVENOUS ONCE
Status: COMPLETED | OUTPATIENT
Start: 2020-01-01 | End: 2020-01-01

## 2020-01-01 RX ORDER — ASPIRIN 81 MG/1
162 TABLET, CHEWABLE ORAL ONCE
Status: COMPLETED | OUTPATIENT
Start: 2020-01-01 | End: 2020-01-01

## 2020-01-01 RX ORDER — PROMETHAZINE HYDROCHLORIDE 25 MG/1
12.5 TABLET ORAL EVERY 6 HOURS PRN
Status: DISCONTINUED | OUTPATIENT
Start: 2020-01-01 | End: 2020-01-01 | Stop reason: HOSPADM

## 2020-01-01 RX ORDER — ASPIRIN 81 MG/1
81 TABLET, CHEWABLE ORAL DAILY
Qty: 30 TABLET | Refills: 3 | Status: SHIPPED | OUTPATIENT
Start: 2020-01-01

## 2020-01-01 RX ORDER — PANTOPRAZOLE SODIUM 40 MG/1
40 TABLET, DELAYED RELEASE ORAL
Status: DISCONTINUED | OUTPATIENT
Start: 2020-01-01 | End: 2020-01-01 | Stop reason: HOSPADM

## 2020-01-01 RX ORDER — CARVEDILOL 3.12 MG/1
3.12 TABLET ORAL 2 TIMES DAILY
COMMUNITY
Start: 2020-01-01

## 2020-01-01 RX ORDER — MONTELUKAST SODIUM 10 MG/1
10 TABLET ORAL NIGHTLY
Status: DISCONTINUED | OUTPATIENT
Start: 2020-01-01 | End: 2020-01-01 | Stop reason: HOSPADM

## 2020-01-01 RX ORDER — SENNA PLUS 8.6 MG/1
1 TABLET ORAL 2 TIMES DAILY
Status: DISCONTINUED | OUTPATIENT
Start: 2020-01-01 | End: 2020-01-01 | Stop reason: HOSPADM

## 2020-01-01 RX ORDER — ROPINIROLE 0.25 MG/1
0.5 TABLET, FILM COATED ORAL 2 TIMES DAILY
Status: DISCONTINUED | OUTPATIENT
Start: 2020-01-01 | End: 2020-01-01 | Stop reason: HOSPADM

## 2020-01-01 RX ORDER — ASPIRIN 81 MG/1
81 TABLET, CHEWABLE ORAL DAILY
Status: DISCONTINUED | OUTPATIENT
Start: 2020-01-01 | End: 2020-01-01 | Stop reason: HOSPADM

## 2020-01-01 RX ORDER — LANOLIN ALCOHOL/MO/W.PET/CERES
400 CREAM (GRAM) TOPICAL DAILY
Status: DISCONTINUED | OUTPATIENT
Start: 2020-01-01 | End: 2020-01-01 | Stop reason: HOSPADM

## 2020-01-01 RX ORDER — GABAPENTIN 300 MG/1
300 CAPSULE ORAL 3 TIMES DAILY
Status: DISCONTINUED | OUTPATIENT
Start: 2020-01-01 | End: 2020-01-01 | Stop reason: HOSPADM

## 2020-01-01 RX ORDER — POTASSIUM CHLORIDE 750 MG/1
20 CAPSULE, EXTENDED RELEASE ORAL DAILY
Status: ON HOLD | COMMUNITY
End: 2020-01-01 | Stop reason: HOSPADM

## 2020-01-01 RX ORDER — BISACODYL 10 MG
10 SUPPOSITORY, RECTAL RECTAL DAILY PRN
Status: ON HOLD | COMMUNITY
End: 2020-01-01 | Stop reason: HOSPADM

## 2020-01-01 RX ORDER — HEPARIN SODIUM 1000 [USP'U]/ML
4000 INJECTION, SOLUTION INTRAVENOUS; SUBCUTANEOUS PRN
Status: DISCONTINUED | OUTPATIENT
Start: 2020-01-01 | End: 2020-01-01 | Stop reason: ALTCHOICE

## 2020-01-01 RX ORDER — LANOLIN ALCOHOL/MO/W.PET/CERES
325 CREAM (GRAM) TOPICAL 2 TIMES DAILY
COMMUNITY

## 2020-01-01 RX ORDER — OXYCODONE HYDROCHLORIDE AND ACETAMINOPHEN 5; 325 MG/1; MG/1
1 TABLET ORAL EVERY 6 HOURS PRN
Status: DISCONTINUED | OUTPATIENT
Start: 2020-01-01 | End: 2020-01-01 | Stop reason: HOSPADM

## 2020-01-01 RX ORDER — HEPARIN SODIUM 5000 [USP'U]/ML
5000 INJECTION, SOLUTION INTRAVENOUS; SUBCUTANEOUS EVERY 8 HOURS SCHEDULED
Status: DISCONTINUED | OUTPATIENT
Start: 2020-01-01 | End: 2020-01-01 | Stop reason: HOSPADM

## 2020-01-01 RX ORDER — PANTOPRAZOLE SODIUM 20 MG/1
20 TABLET, DELAYED RELEASE ORAL 2 TIMES DAILY
Status: DISCONTINUED | OUTPATIENT
Start: 2020-01-01 | End: 2020-01-01 | Stop reason: HOSPADM

## 2020-01-01 RX ORDER — UREA 10 %
3 LOTION (ML) TOPICAL NIGHTLY
Status: DISCONTINUED | OUTPATIENT
Start: 2020-01-01 | End: 2020-01-01 | Stop reason: HOSPADM

## 2020-01-01 RX ORDER — HEPARIN SODIUM 10000 [USP'U]/100ML
12 INJECTION, SOLUTION INTRAVENOUS CONTINUOUS
Status: DISCONTINUED | OUTPATIENT
Start: 2020-01-01 | End: 2020-01-01

## 2020-01-01 RX ORDER — LORAZEPAM 2 MG/ML
INJECTION INTRAMUSCULAR
Status: DISCONTINUED
Start: 2020-01-01 | End: 2020-01-01 | Stop reason: HOSPADM

## 2020-01-01 RX ORDER — OXYCODONE HYDROCHLORIDE AND ACETAMINOPHEN 5; 325 MG/1; MG/1
1 TABLET ORAL NIGHTLY
COMMUNITY
Start: 2020-01-01

## 2020-01-01 RX ORDER — LANOLIN ALCOHOL/MO/W.PET/CERES
325 CREAM (GRAM) TOPICAL 2 TIMES DAILY
Status: DISCONTINUED | OUTPATIENT
Start: 2020-01-01 | End: 2020-01-01 | Stop reason: HOSPADM

## 2020-01-01 RX ORDER — DOCUSATE SODIUM 100 MG/1
100 CAPSULE, LIQUID FILLED ORAL 2 TIMES DAILY
Status: DISCONTINUED | OUTPATIENT
Start: 2020-01-01 | End: 2020-01-01 | Stop reason: HOSPADM

## 2020-01-01 RX ORDER — LEVOFLOXACIN 5 MG/ML
500 INJECTION, SOLUTION INTRAVENOUS ONCE
Status: COMPLETED | OUTPATIENT
Start: 2020-01-01 | End: 2020-01-01

## 2020-01-01 RX ORDER — FERROUS SULFATE 325(65) MG
325 TABLET ORAL 2 TIMES DAILY
Status: DISCONTINUED | OUTPATIENT
Start: 2020-01-01 | End: 2020-01-01 | Stop reason: HOSPADM

## 2020-01-01 RX ORDER — NICOTINE 21 MG/24HR
1 PATCH, TRANSDERMAL 24 HOURS TRANSDERMAL DAILY PRN
Status: DISCONTINUED | OUTPATIENT
Start: 2020-01-01 | End: 2020-01-01 | Stop reason: HOSPADM

## 2020-01-01 RX ORDER — ACETAMINOPHEN 325 MG/1
650 TABLET ORAL EVERY 6 HOURS PRN
Status: DISCONTINUED | OUTPATIENT
Start: 2020-01-01 | End: 2020-01-01 | Stop reason: HOSPADM

## 2020-01-01 RX ORDER — TAMSULOSIN HYDROCHLORIDE 0.4 MG/1
0.4 CAPSULE ORAL DAILY
Status: DISCONTINUED | OUTPATIENT
Start: 2020-01-01 | End: 2020-01-01 | Stop reason: HOSPADM

## 2020-01-01 RX ORDER — GABAPENTIN 300 MG/1
300 CAPSULE ORAL 2 TIMES DAILY
Status: DISCONTINUED | OUTPATIENT
Start: 2020-01-01 | End: 2020-01-01 | Stop reason: HOSPADM

## 2020-01-01 RX ORDER — ROPINIROLE 0.5 MG/1
0.5 TABLET, FILM COATED ORAL 2 TIMES DAILY
Status: ON HOLD | COMMUNITY
Start: 2020-01-01 | End: 2020-01-01 | Stop reason: HOSPADM

## 2020-01-01 RX ORDER — GABAPENTIN 300 MG/1
300 CAPSULE ORAL 3 TIMES DAILY
COMMUNITY

## 2020-01-01 RX ORDER — DOCUSATE SODIUM 100 MG/1
100 CAPSULE, LIQUID FILLED ORAL 2 TIMES DAILY
Status: DISCONTINUED | OUTPATIENT
Start: 2020-01-01 | End: 2020-01-01

## 2020-01-01 RX ORDER — POLYETHYLENE GLYCOL 3350 17 G/17G
POWDER, FOR SOLUTION ORAL
COMMUNITY
Start: 2020-01-01 | End: 2020-01-01 | Stop reason: SDUPTHER

## 2020-01-01 RX ORDER — GABAPENTIN 300 MG/1
300 CAPSULE ORAL 2 TIMES DAILY
Qty: 60 CAPSULE | Refills: 3 | DISCHARGE
Start: 2020-01-01 | End: 2021-01-04

## 2020-01-01 RX ORDER — ONDANSETRON 4 MG/1
4 TABLET, ORALLY DISINTEGRATING ORAL EVERY 6 HOURS PRN
Status: ON HOLD | COMMUNITY
End: 2020-01-01 | Stop reason: HOSPADM

## 2020-01-01 RX ORDER — POLYETHYLENE GLYCOL 3350 17 G/17G
17 POWDER, FOR SOLUTION ORAL DAILY
Status: DISCONTINUED | OUTPATIENT
Start: 2020-01-01 | End: 2020-01-01 | Stop reason: SDUPTHER

## 2020-01-01 RX ORDER — CIPROFLOXACIN 2 MG/ML
200 INJECTION, SOLUTION INTRAVENOUS EVERY 12 HOURS
Status: DISCONTINUED | OUTPATIENT
Start: 2020-01-01 | End: 2020-01-01 | Stop reason: HOSPADM

## 2020-01-01 RX ORDER — 0.9 % SODIUM CHLORIDE 0.9 %
20 INTRAVENOUS SOLUTION INTRAVENOUS ONCE
Status: DISCONTINUED | OUTPATIENT
Start: 2020-01-01 | End: 2020-01-01 | Stop reason: HOSPADM

## 2020-01-01 RX ORDER — TAMSULOSIN HYDROCHLORIDE 0.4 MG/1
0.4 CAPSULE ORAL EVERY EVENING
COMMUNITY

## 2020-01-01 RX ORDER — ALBUTEROL SULFATE 2.5 MG/3ML
2.5 SOLUTION RESPIRATORY (INHALATION) EVERY 4 HOURS PRN
COMMUNITY

## 2020-01-01 RX ORDER — HEPARIN SODIUM 1000 [USP'U]/ML
2000 INJECTION, SOLUTION INTRAVENOUS; SUBCUTANEOUS PRN
Status: DISCONTINUED | OUTPATIENT
Start: 2020-01-01 | End: 2020-01-01 | Stop reason: ALTCHOICE

## 2020-01-01 RX ORDER — POLYETHYLENE GLYCOL 3350 17 G/17G
17 POWDER, FOR SOLUTION ORAL DAILY PRN
Status: DISCONTINUED | OUTPATIENT
Start: 2020-01-01 | End: 2020-01-01 | Stop reason: HOSPADM

## 2020-01-01 RX ORDER — FINASTERIDE 5 MG/1
5 TABLET, FILM COATED ORAL DAILY
COMMUNITY
Start: 2020-01-01

## 2020-01-01 RX ORDER — LANOLIN ALCOHOL/MO/W.PET/CERES
3 CREAM (GRAM) TOPICAL NIGHTLY
Status: DISCONTINUED | OUTPATIENT
Start: 2020-01-01 | End: 2020-01-01 | Stop reason: HOSPADM

## 2020-01-01 RX ORDER — POTASSIUM CHLORIDE 20MEQ/15ML
20 LIQUID (ML) ORAL DAILY
Status: ON HOLD | COMMUNITY
End: 2020-01-01 | Stop reason: HOSPADM

## 2020-01-01 RX ORDER — POLYETHYLENE GLYCOL 3350 17 G/17G
17 POWDER, FOR SOLUTION ORAL DAILY
COMMUNITY
End: 2020-01-01 | Stop reason: ALTCHOICE

## 2020-01-01 RX ORDER — LANOLIN ALCOHOL/MO/W.PET/CERES
3 CREAM (GRAM) TOPICAL NIGHTLY
COMMUNITY

## 2020-01-01 RX ORDER — ACETAMINOPHEN 325 MG/1
650 TABLET ORAL EVERY 6 HOURS PRN
Status: DISCONTINUED | OUTPATIENT
Start: 2020-01-01 | End: 2020-01-01 | Stop reason: SDUPTHER

## 2020-01-01 RX ORDER — NICOTINE POLACRILEX 4 MG
15-30 LOZENGE BUCCAL PRN
Status: ON HOLD | COMMUNITY
End: 2020-01-01 | Stop reason: HOSPADM

## 2020-01-01 RX ORDER — OXYCODONE HYDROCHLORIDE AND ACETAMINOPHEN 5; 325 MG/1; MG/1
1 TABLET ORAL EVERY 6 HOURS PRN
Qty: 10 TABLET | Refills: 0 | Status: SHIPPED | OUTPATIENT
Start: 2020-01-01 | End: 2020-01-01 | Stop reason: ALTCHOICE

## 2020-01-01 RX ORDER — PROMETHAZINE HYDROCHLORIDE 12.5 MG/1
12.5 TABLET ORAL EVERY 6 HOURS PRN
Status: DISCONTINUED | OUTPATIENT
Start: 2020-01-01 | End: 2020-01-01 | Stop reason: HOSPADM

## 2020-01-01 RX ORDER — 0.9 % SODIUM CHLORIDE 0.9 %
20 INTRAVENOUS SOLUTION INTRAVENOUS ONCE
Status: CANCELLED | OUTPATIENT
Start: 2020-01-01 | End: 2020-01-01

## 2020-01-01 RX ORDER — DOCUSATE SODIUM 100 MG/1
100 CAPSULE, LIQUID FILLED ORAL 2 TIMES DAILY
COMMUNITY

## 2020-01-01 RX ORDER — LORAZEPAM 2 MG/ML
1 INJECTION INTRAMUSCULAR EVERY 6 HOURS PRN
Status: DISCONTINUED | OUTPATIENT
Start: 2020-01-01 | End: 2020-01-01 | Stop reason: HOSPADM

## 2020-01-01 RX ORDER — OMEPRAZOLE 20 MG/1
20 CAPSULE, DELAYED RELEASE ORAL
COMMUNITY
End: 2020-01-01 | Stop reason: ALTCHOICE

## 2020-01-01 RX ORDER — POLYVINYL ALCOHOL 14 MG/ML
2 SOLUTION/ DROPS OPHTHALMIC 2 TIMES DAILY
Status: DISCONTINUED | OUTPATIENT
Start: 2020-01-01 | End: 2020-01-01 | Stop reason: HOSPADM

## 2020-01-01 RX ORDER — CALCIUM CARBONATE 300MG(750)
1 TABLET,CHEWABLE ORAL DAILY
COMMUNITY

## 2020-01-01 RX ORDER — CIPROFLOXACIN 2 MG/ML
400 INJECTION, SOLUTION INTRAVENOUS EVERY 12 HOURS
Status: DISCONTINUED | OUTPATIENT
Start: 2020-01-01 | End: 2020-01-01

## 2020-01-01 RX ADMIN — ROPINIROLE HYDROCHLORIDE 0.5 MG: 0.25 TABLET, FILM COATED ORAL at 22:04

## 2020-01-01 RX ADMIN — DEXAMETHASONE SODIUM PHOSPHATE 6 MG: 4 INJECTION, SOLUTION INTRAMUSCULAR; INTRAVENOUS at 09:22

## 2020-01-01 RX ADMIN — ALBUTEROL SULFATE 2 PUFF: 90 AEROSOL, METERED RESPIRATORY (INHALATION) at 17:34

## 2020-01-01 RX ADMIN — DOCUSATE SODIUM 100 MG: 100 CAPSULE, LIQUID FILLED ORAL at 20:57

## 2020-01-01 RX ADMIN — FERROUS SULFATE TAB 325 MG (65 MG ELEMENTAL FE) 325 MG: 325 (65 FE) TAB at 22:06

## 2020-01-01 RX ADMIN — ALBUTEROL SULFATE 2 PUFF: 90 AEROSOL, METERED RESPIRATORY (INHALATION) at 08:13

## 2020-01-01 RX ADMIN — PANTOPRAZOLE SODIUM 40 MG: 40 TABLET, DELAYED RELEASE ORAL at 07:48

## 2020-01-01 RX ADMIN — GABAPENTIN 300 MG: 300 CAPSULE ORAL at 20:57

## 2020-01-01 RX ADMIN — LEVOFLOXACIN 500 MG: 5 INJECTION, SOLUTION INTRAVENOUS at 12:09

## 2020-01-01 RX ADMIN — FINASTERIDE 5 MG: 5 TABLET, FILM COATED ORAL at 22:06

## 2020-01-01 RX ADMIN — ROPINIROLE HYDROCHLORIDE 0.5 MG: 0.25 TABLET, FILM COATED ORAL at 09:22

## 2020-01-01 RX ADMIN — ENOXAPARIN SODIUM 30 MG: 30 INJECTION SUBCUTANEOUS at 12:58

## 2020-01-01 RX ADMIN — GABAPENTIN 300 MG: 300 CAPSULE ORAL at 22:04

## 2020-01-01 RX ADMIN — SODIUM CHLORIDE: 9 INJECTION, SOLUTION INTRAVENOUS at 01:09

## 2020-01-01 RX ADMIN — PANTOPRAZOLE SODIUM 20 MG: 20 TABLET, DELAYED RELEASE ORAL at 20:57

## 2020-01-01 RX ADMIN — Medication 400 MG: at 09:22

## 2020-01-01 RX ADMIN — SENNOSIDES 8.6 MG: 8.6 TABLET, FILM COATED ORAL at 08:11

## 2020-01-01 RX ADMIN — GABAPENTIN 300 MG: 300 CAPSULE ORAL at 21:11

## 2020-01-01 RX ADMIN — TAMSULOSIN HYDROCHLORIDE 0.4 MG: 0.4 CAPSULE ORAL at 23:17

## 2020-01-01 RX ADMIN — ASPIRIN 81 MG CHEWABLE TABLET 81 MG: 81 TABLET CHEWABLE at 22:04

## 2020-01-01 RX ADMIN — DEXAMETHASONE SODIUM PHOSPHATE 6 MG: 4 INJECTION, SOLUTION INTRAMUSCULAR; INTRAVENOUS at 12:59

## 2020-01-01 RX ADMIN — POTASSIUM CHLORIDE 20 MEQ: 750 CAPSULE, EXTENDED RELEASE ORAL at 08:34

## 2020-01-01 RX ADMIN — FERROUS SULFATE TAB 325 MG (65 MG ELEMENTAL FE) 325 MG: 325 (65 FE) TAB at 08:11

## 2020-01-01 RX ADMIN — ALBUTEROL SULFATE 2 PUFF: 90 AEROSOL, METERED RESPIRATORY (INHALATION) at 12:58

## 2020-01-01 RX ADMIN — FINASTERIDE 5 MG: 5 TABLET, FILM COATED ORAL at 09:22

## 2020-01-01 RX ADMIN — ALBUTEROL SULFATE 2 PUFF: 90 AEROSOL, METERED RESPIRATORY (INHALATION) at 10:12

## 2020-01-01 RX ADMIN — ASPIRIN 81 MG CHEWABLE TABLET 81 MG: 81 TABLET CHEWABLE at 08:10

## 2020-01-01 RX ADMIN — FERROUS SULFATE TAB 325 MG (65 MG ELEMENTAL FE) 325 MG: 325 (65 FE) TAB at 09:22

## 2020-01-01 RX ADMIN — GABAPENTIN 300 MG: 300 CAPSULE ORAL at 08:35

## 2020-01-01 RX ADMIN — HEPARIN SODIUM 2000 UNITS: 1000 INJECTION INTRAVENOUS; SUBCUTANEOUS at 23:09

## 2020-01-01 RX ADMIN — CIPROFLOXACIN 200 MG: 2 INJECTION, SOLUTION INTRAVENOUS at 08:35

## 2020-01-01 RX ADMIN — Medication 400 MG: at 22:06

## 2020-01-01 RX ADMIN — ENOXAPARIN SODIUM 70 MG: 80 INJECTION SUBCUTANEOUS at 12:11

## 2020-01-01 RX ADMIN — ASPIRIN 81 MG 81 MG: 81 TABLET ORAL at 08:35

## 2020-01-01 RX ADMIN — ALBUTEROL SULFATE 2 PUFF: 90 AEROSOL, METERED RESPIRATORY (INHALATION) at 20:58

## 2020-01-01 RX ADMIN — GABAPENTIN 300 MG: 300 CAPSULE ORAL at 15:07

## 2020-01-01 RX ADMIN — POTASSIUM BICARBONATE 20 MEQ: 391 TABLET, EFFERVESCENT ORAL at 08:10

## 2020-01-01 RX ADMIN — FERROUS SULFATE TAB EC 325 MG (65 MG FE EQUIVALENT) 325 MG: 325 (65 FE) TABLET DELAYED RESPONSE at 08:35

## 2020-01-01 RX ADMIN — LORAZEPAM 1 MG: 2 INJECTION INTRAMUSCULAR; INTRAVENOUS at 23:12

## 2020-01-01 RX ADMIN — SODIUM CHLORIDE: 9 INJECTION, SOLUTION INTRAVENOUS at 11:42

## 2020-01-01 RX ADMIN — HEPARIN SODIUM 5000 UNITS: 5000 INJECTION INTRAVENOUS; SUBCUTANEOUS at 15:07

## 2020-01-01 RX ADMIN — SODIUM CHLORIDE: 9 INJECTION, SOLUTION INTRAVENOUS at 21:22

## 2020-01-01 RX ADMIN — ASPIRIN 81 MG 81 MG: 81 TABLET ORAL at 21:11

## 2020-01-01 RX ADMIN — REMDESIVIR 200 MG: 100 INJECTION, POWDER, LYOPHILIZED, FOR SOLUTION INTRAVENOUS at 12:57

## 2020-01-01 RX ADMIN — SODIUM CHLORIDE: 9 INJECTION, SOLUTION INTRAVENOUS at 19:14

## 2020-01-01 RX ADMIN — ATORVASTATIN CALCIUM 20 MG: 20 TABLET, FILM COATED ORAL at 22:06

## 2020-01-01 RX ADMIN — DOCUSATE SODIUM 100 MG: 100 CAPSULE, LIQUID FILLED ORAL at 08:11

## 2020-01-01 RX ADMIN — MONTELUKAST SODIUM 10 MG: 10 TABLET, FILM COATED ORAL at 22:04

## 2020-01-01 RX ADMIN — CARVEDILOL 3.12 MG: 3.12 TABLET, FILM COATED ORAL at 20:57

## 2020-01-01 RX ADMIN — ROPINIROLE HYDROCHLORIDE 0.5 MG: 0.25 TABLET, FILM COATED ORAL at 21:11

## 2020-01-01 RX ADMIN — PANTOPRAZOLE SODIUM 20 MG: 20 TABLET, DELAYED RELEASE ORAL at 22:06

## 2020-01-01 RX ADMIN — PANTOPRAZOLE SODIUM 20 MG: 20 TABLET, DELAYED RELEASE ORAL at 09:22

## 2020-01-01 RX ADMIN — ROPINIROLE HYDROCHLORIDE 0.5 MG: 0.25 TABLET, FILM COATED ORAL at 08:10

## 2020-01-01 RX ADMIN — REMDESIVIR 100 MG: 100 INJECTION, POWDER, LYOPHILIZED, FOR SOLUTION INTRAVENOUS at 12:18

## 2020-01-01 RX ADMIN — POTASSIUM BICARBONATE 20 MEQ: 391 TABLET, EFFERVESCENT ORAL at 09:22

## 2020-01-01 RX ADMIN — ROPINIROLE HYDROCHLORIDE 0.5 MG: 0.25 TABLET, FILM COATED ORAL at 20:57

## 2020-01-01 RX ADMIN — GABAPENTIN 300 MG: 300 CAPSULE ORAL at 08:10

## 2020-01-01 RX ADMIN — MAGNESIUM GLUCONATE 500 MG ORAL TABLET 400 MG: 500 TABLET ORAL at 15:07

## 2020-01-01 RX ADMIN — MONTELUKAST SODIUM 10 MG: 10 TABLET, FILM COATED ORAL at 20:57

## 2020-01-01 RX ADMIN — MELATONIN TAB 3 MG 3 MG: 3 TAB at 21:11

## 2020-01-01 RX ADMIN — TAMSULOSIN HYDROCHLORIDE 0.4 MG: 0.4 CAPSULE ORAL at 17:36

## 2020-01-01 RX ADMIN — POLYVINYL ALCOHOL 2 DROP: 14 SOLUTION/ DROPS OPHTHALMIC at 08:36

## 2020-01-01 RX ADMIN — FERROUS SULFATE TAB 325 MG (65 MG ELEMENTAL FE) 325 MG: 325 (65 FE) TAB at 20:57

## 2020-01-01 RX ADMIN — GABAPENTIN 300 MG: 300 CAPSULE ORAL at 09:22

## 2020-01-01 RX ADMIN — ENOXAPARIN SODIUM 30 MG: 30 INJECTION SUBCUTANEOUS at 08:11

## 2020-01-01 RX ADMIN — FINASTERIDE 5 MG: 5 TABLET, FILM COATED ORAL at 08:10

## 2020-01-01 RX ADMIN — OXYCODONE HYDROCHLORIDE AND ACETAMINOPHEN 1 TABLET: 5; 325 TABLET ORAL at 07:57

## 2020-01-01 RX ADMIN — ASPIRIN 81 MG CHEWABLE TABLET 81 MG: 81 TABLET CHEWABLE at 09:22

## 2020-01-01 RX ADMIN — ALBUTEROL SULFATE 2 PUFF: 90 AEROSOL, METERED RESPIRATORY (INHALATION) at 22:15

## 2020-01-01 RX ADMIN — CIPROFLOXACIN 200 MG: 2 INJECTION, SOLUTION INTRAVENOUS at 21:16

## 2020-01-01 RX ADMIN — FERROUS SULFATE TAB EC 325 MG (65 MG FE EQUIVALENT) 325 MG: 325 (65 FE) TABLET DELAYED RESPONSE at 21:11

## 2020-01-01 RX ADMIN — DEXAMETHASONE SODIUM PHOSPHATE 6 MG: 4 INJECTION, SOLUTION INTRAMUSCULAR; INTRAVENOUS at 08:11

## 2020-01-01 RX ADMIN — ATORVASTATIN CALCIUM 20 MG: 20 TABLET, FILM COATED ORAL at 20:57

## 2020-01-01 RX ADMIN — TAMSULOSIN HYDROCHLORIDE 0.4 MG: 0.4 CAPSULE ORAL at 22:04

## 2020-01-01 RX ADMIN — ALBUTEROL SULFATE 2 PUFF: 90 AEROSOL, METERED RESPIRATORY (INHALATION) at 15:53

## 2020-01-01 RX ADMIN — DOCUSATE SODIUM 100 MG: 100 CAPSULE, LIQUID FILLED ORAL at 22:04

## 2020-01-01 RX ADMIN — REMDESIVIR 100 MG: 100 INJECTION, POWDER, LYOPHILIZED, FOR SOLUTION INTRAVENOUS at 11:38

## 2020-01-01 RX ADMIN — HEPARIN SODIUM 4000 UNITS: 1000 INJECTION INTRAVENOUS; SUBCUTANEOUS at 23:08

## 2020-01-01 RX ADMIN — MAGNESIUM GLUCONATE 500 MG ORAL TABLET 400 MG: 500 TABLET ORAL at 21:11

## 2020-01-01 RX ADMIN — SENNOSIDES 8.6 MG: 8.6 TABLET, FILM COATED ORAL at 09:22

## 2020-01-01 RX ADMIN — CARVEDILOL 3.12 MG: 3.12 TABLET, FILM COATED ORAL at 09:22

## 2020-01-01 RX ADMIN — CARVEDILOL 3.12 MG: 3.12 TABLET, FILM COATED ORAL at 08:10

## 2020-01-01 RX ADMIN — POTASSIUM CHLORIDE 20 MEQ: 750 CAPSULE, EXTENDED RELEASE ORAL at 21:11

## 2020-01-01 RX ADMIN — ATORVASTATIN CALCIUM 20 MG: 20 TABLET, FILM COATED ORAL at 21:11

## 2020-01-01 RX ADMIN — PANTOPRAZOLE SODIUM 20 MG: 20 TABLET, DELAYED RELEASE ORAL at 08:11

## 2020-01-01 RX ADMIN — Medication 10 ML: at 08:35

## 2020-01-01 RX ADMIN — ALBUTEROL SULFATE 2 PUFF: 90 AEROSOL, METERED RESPIRATORY (INHALATION) at 11:40

## 2020-01-01 RX ADMIN — ALBUTEROL SULFATE 2 PUFF: 90 AEROSOL, METERED RESPIRATORY (INHALATION) at 12:21

## 2020-01-01 RX ADMIN — ENOXAPARIN SODIUM 30 MG: 30 INJECTION SUBCUTANEOUS at 09:22

## 2020-01-01 RX ADMIN — SENNOSIDES 8.6 MG: 8.6 TABLET, FILM COATED ORAL at 22:05

## 2020-01-01 RX ADMIN — MONTELUKAST 10 MG: 10 TABLET, FILM COATED ORAL at 21:22

## 2020-01-01 RX ADMIN — SODIUM CHLORIDE: 9 INJECTION, SOLUTION INTRAVENOUS at 02:15

## 2020-01-01 RX ADMIN — Medication 400 MG: at 08:11

## 2020-01-01 RX ADMIN — SENNOSIDES 8.6 MG: 8.6 TABLET, FILM COATED ORAL at 20:57

## 2020-01-01 RX ADMIN — DOCUSATE SODIUM 100 MG: 100 CAPSULE, LIQUID FILLED ORAL at 09:22

## 2020-01-01 RX ADMIN — FUROSEMIDE 20 MG: 10 INJECTION, SOLUTION INTRAMUSCULAR; INTRAVENOUS at 19:44

## 2020-01-01 RX ADMIN — Medication 10 ML: at 12:09

## 2020-01-01 RX ADMIN — TAMSULOSIN HYDROCHLORIDE 0.4 MG: 0.4 CAPSULE ORAL at 08:35

## 2020-01-01 RX ADMIN — Medication 10 ML: at 21:16

## 2020-01-01 RX ADMIN — ALBUTEROL SULFATE 2 PUFF: 90 AEROSOL, METERED RESPIRATORY (INHALATION) at 05:24

## 2020-01-01 RX ADMIN — HEPARIN SODIUM 14 UNITS/KG/HR: 10000 INJECTION, SOLUTION INTRAVENOUS at 23:11

## 2020-01-01 RX ADMIN — ASPIRIN 81 MG 162 MG: 81 TABLET ORAL at 12:07

## 2020-01-01 RX ADMIN — ROPINIROLE HYDROCHLORIDE 0.5 MG: 0.25 TABLET, FILM COATED ORAL at 08:35

## 2020-01-01 RX ADMIN — CARVEDILOL 3.12 MG: 3.12 TABLET, FILM COATED ORAL at 22:05

## 2020-01-01 ASSESSMENT — PAIN SCALES - GENERAL
PAINLEVEL_OUTOF10: 0
PAINLEVEL_OUTOF10: 2
PAINLEVEL_OUTOF10: 0
PAINLEVEL_OUTOF10: 7
PAINLEVEL_OUTOF10: 0
PAINLEVEL_OUTOF10: 0
PAINLEVEL_OUTOF10: 4
PAINLEVEL_OUTOF10: 0

## 2020-01-01 ASSESSMENT — ENCOUNTER SYMPTOMS
COLOR CHANGE: 0
CHEST TIGHTNESS: 0
ABDOMINAL PAIN: 0
EYE REDNESS: 0
COUGH: 0
NAUSEA: 0
ABDOMINAL PAIN: 0
ABDOMINAL PAIN: 0
EYE PAIN: 0
SHORTNESS OF BREATH: 0
COUGH: 0
NAUSEA: 0
BACK PAIN: 0
EYE PAIN: 0
BACK PAIN: 0
CONSTIPATION: 1
WHEEZING: 0
COUGH: 0
CONSTIPATION: 0
WHEEZING: 0
COLOR CHANGE: 0
SHORTNESS OF BREATH: 0
VOMITING: 0
WHEEZING: 0
COLOR CHANGE: 0
EYE REDNESS: 0
WHEEZING: 0
SHORTNESS OF BREATH: 0
SHORTNESS OF BREATH: 0
BACK PAIN: 0
EYE REDNESS: 0
EYE PAIN: 0
CONSTIPATION: 0
SHORTNESS OF BREATH: 0
VOMITING: 0
COUGH: 0
COLOR CHANGE: 0
ABDOMINAL PAIN: 0
EYE REDNESS: 0
BACK PAIN: 0
NAUSEA: 0
NAUSEA: 0
VOMITING: 0
VOMITING: 0
EYE PAIN: 0

## 2020-04-10 PROBLEM — R41.0 DISORIENTATION: Status: ACTIVE | Noted: 2020-01-01

## 2020-04-10 PROBLEM — I21.4 NSTEMI (NON-ST ELEVATED MYOCARDIAL INFARCTION) (HCC): Status: ACTIVE | Noted: 2020-01-01

## 2020-04-10 PROBLEM — M54.16 RADICULOPATHY, LUMBAR REGION: Status: ACTIVE | Noted: 2019-09-27

## 2020-04-10 PROBLEM — N39.0 UTI (URINARY TRACT INFECTION): Status: ACTIVE | Noted: 2020-01-01

## 2020-04-10 NOTE — H&P
Past Surgical History:     Past Surgical History:   Procedure Laterality Date   Thuy Alexander Speak  2004    Dr. Alberto Hurtado    ERCP  08/02/2017    balloon sweep,    ERCP N/A 8/2/2017    ERCP ENDOSCOPIC RETROGRADE CHOLANGIOPANCREATOGRAPHY WITH BALLOON SWEEP AND CHOLANGIOGRAM, GI UNIT, C-ARM  performed by Shayan Juárez MD at [address] [facility]      blood in the eyeball    OTHER SURGICAL HISTORY  05-20-16    Laparoscopic selena. converted to open selena.  TONSILLECTOMY          Medications Prior to Admission:     Prior to Admission medications    Medication Sig Start Date End Date Taking? Authorizing Provider   Carboxymethylcellulose Sodium (ARTIFICIAL TEARS OP) Apply 1 drop to eye 2 times daily Both eyes    Historical Provider, MD   atorvastatin (LIPITOR) 20 MG tablet Take 20 mg by mouth nightly    Historical Provider, MD   docusate sodium (COLACE) 100 MG capsule Take 100 mg by mouth 2 times daily    Historical Provider, MD   gabapentin (NEURONTIN) 300 MG capsule Take 300 mg by mouth 3 times daily. Historical Provider, MD   Magnesium 400 MG TABS Take 1 tablet by mouth daily    Historical Provider, MD   melatonin 3 MG TABS tablet Take 3 mg by mouth nightly    Historical Provider, MD   polyethylene glycol (GLYCOLAX) powder Take 17 g by mouth daily    Historical Provider, MD   tamsulosin (FLOMAX) 0.4 MG capsule Take 0.4 mg by mouth daily    Historical Provider, MD   omeprazole (PRILOSEC) 20 MG delayed release capsule Take 20 mg by mouth daily    Historical Provider, MD   potassium chloride (MICRO-K) 10 MEQ extended release capsule Take 20 mEq by mouth daily    Historical Provider, MD   oxyCODONE-acetaminophen (PERCOCET) 5-325 MG per tablet Take 1 tablet by mouth every 6 hours as needed.  3/9/20   Historical Provider, MD   acetaminophen (TYLENOL) 500 MG tablet Take 1,000 mg by mouth every 8 hours as needed for Pain NRBC Automated NOT REPORTED per 100 WBC    Differential Type YES     Seg Neutrophils 68 39 - 75 %    Lymphocytes 21 13 - 44 %    Monocytes 9 5 - 9 %    Eosinophils % 2 0 - 5 %    Basophils 0 0 - 2 %    Immature Granulocytes NOT REPORTED 0 %    Segs Absolute 3.60 2.1 - 6.5 k/uL    Absolute Lymph # 1.10 1.0 - 4.8 k/uL    Absolute Mono # 0.50 0.0 - 1.0 k/uL    Absolute Eos # 0.10 0.0 - 0.4 k/uL    Basophils Absolute 0.00 0.0 - 0.2 k/uL    Absolute Immature Granulocyte NOT REPORTED 0.00 - 0.30 k/uL    WBC Morphology NOT REPORTED     RBC Morphology NOT REPORTED     Platelet Estimate NOT REPORTED    Comprehensive Metabolic Panel w/ Reflex to MG    Collection Time: 04/10/20 10:00 AM   Result Value Ref Range    Glucose 121 (H) 70 - 99 mg/dL    BUN 46 (H) 8 - 23 mg/dL    CREATININE 1.74 (H) 0.70 - 1.20 mg/dL    Bun/Cre Ratio 26 (H) 9 - 20    Calcium 9.4 8.6 - 10.4 mg/dL    Sodium 142 135 - 144 mmol/L    Potassium 4.8 3.7 - 5.3 mmol/L    Chloride 109 (H) 98 - 107 mmol/L    CO2 20 20 - 31 mmol/L    Anion Gap 13 9 - 17 mmol/L    Alkaline Phosphatase 124 40 - 129 U/L    ALT 14 5 - 41 U/L    AST 29 <40 U/L    Total Bilirubin 0.39 0.30 - 1.20 mg/dL    Total Protein 6.6 6.4 - 8.3 g/dL    Alb 3.4 (L) 3.5 - 5.2 g/dL    Albumin/Globulin Ratio NOT REPORTED 1.0 - 2.5    GFR Non- 37 (L) >60 mL/min    GFR  45 (L) >60 mL/min    GFR Comment          GFR Staging NOT REPORTED    Lipase    Collection Time: 04/10/20 10:00 AM   Result Value Ref Range    Lipase 7 (L) 13 - 60 U/L   Lactic Acid    Collection Time: 04/10/20 10:00 AM   Result Value Ref Range    Lactic Acid 0.9 0.5 - 2.2 mmol/L   Troponin    Collection Time: 04/10/20 10:00 AM   Result Value Ref Range    Troponin, High Sensitivity NOT REPORTED 0 - 22 ng/L    Troponin T 0.12 (HH) <0.03 ng/mL    Troponin Interp         TYPE AND SCREEN    Collection Time: 04/10/20 10:05 AM   Result Value Ref Range    Expiration Date 04/13/2020,6002     Arm Band Number NOT REPORTED     ABO/Rh A POSITIVE     Antibody Screen NEGATIVE    Urinalysis, reflex to microscopic    Collection Time: 04/10/20 10:12 AM   Result Value Ref Range    Color, UA YELLOW YELLOW    Turbidity UA HAZY (A) CLEAR    Glucose, Ur NEGATIVE NEGATIVE    Bilirubin Urine NEGATIVE NEGATIVE    Ketones, Urine NEGATIVE NEGATIVE    Specific Gravity, UA 1.010 1.005 - 1.030    Urine Hgb 3+ (A) NEGATIVE    pH, UA 5.0 5.0 - 8.0    Protein, UA 1+ (A) NEGATIVE    Urobilinogen, Urine Normal Normal    Nitrite, Urine POSITIVE (A) NEGATIVE    Leukocyte Esterase, Urine 2+ (A) NEGATIVE    Urinalysis Comments         Microscopic Urinalysis    Collection Time: 04/10/20 10:12 AM   Result Value Ref Range    -          WBC, UA 20 TO 50 0 /HPF    RBC, UA 20 TO 50 0 - 2 /HPF    Casts UA NOT REPORTED /LPF    Crystals, UA NOT REPORTED None /HPF    Epithelial Cells UA NOT REPORTED /HPF    Renal Epithelial, UA 2 TO 5 0 /HPF    Bacteria, UA 3+ (A) None    Mucus, UA NOT REPORTED None    Trichomonas, UA NOT REPORTED None    Amorphous, UA NOT REPORTED None    Other Observations UA SOME CLUMPING OF WBC'S ON MICROSCOPIC NOT REQ.     Yeast, UA NOT REPORTED None   EKG 12 Lead    Collection Time: 04/10/20 10:18 AM   Result Value Ref Range    Ventricular Rate 67 BPM    Atrial Rate 67 BPM    P-R Interval 148 ms    QRS Duration 90 ms    Q-T Interval 418 ms    QTc Calculation (Bazett) 441 ms    P Axis 56 degrees    R Axis -42 degrees    T Axis 59 degrees   Troponin    Collection Time: 04/10/20 11:55 AM   Result Value Ref Range    Troponin, High Sensitivity NOT REPORTED 0 - 22 ng/L    Troponin T 0.12 (HH) <0.03 ng/mL    Troponin Interp         Lactic Acid    Collection Time: 04/10/20 11:55 AM   Result Value Ref Range    Lactic Acid 1.0 0.5 - 2.2 mmol/L   CBC    Collection Time: 04/10/20  6:43 PM   Result Value Ref Range    WBC 5.0 3.5 - 11.3 k/uL    RBC 2.70 (L) 4.21 - 5.77 m/uL    Hemoglobin 8.9 (L) 13.0 - 17.0 g/dL    Hematocrit 28.1 (L) 40.7 - 50.3 %    MCV

## 2020-04-10 NOTE — ED PROVIDER NOTES
unspecified altered mental status type          DISPOSITION/PLAN   trn    PATIENT REFERRED TO:  No follow-up provider specified. DISCHARGE MEDICATIONS:  New Prescriptions    No medications on file           Summation      Patient Course:  trn    ED Medications administered this visit:    Medications   levofloxacin (LEVAQUIN) 500 MG/100ML infusion 500 mg (has no administration in time range)   sodium chloride flush 0.9 % injection 10 mL (has no administration in time range)   sodium chloride flush 0.9 % injection 10 mL (has no administration in time range)   aspirin chewable tablet 162 mg (has no administration in time range)   enoxaparin (LOVENOX) injection 70 mg (has no administration in time range)       New Prescriptions from this visit:    New Prescriptions    No medications on file       Follow-up:  No follow-up provider specified. Final Impression:   1. NSTEMI (non-ST elevated myocardial infarction) ([facility] [address]    2. Acute cystitis with hematuria    3.  Altered mental status, unspecified altered mental status type               (Please note that portions of this note were completed with a voice recognition program.  Efforts were made to edit the dictations but occasionally words are mis-transcribed.)    MD Bret Mc MD  04/10/20 3612

## 2020-04-10 NOTE — PROGRESS NOTES
[address], denies further questions at this time.

## 2020-04-11 NOTE — PLAN OF CARE
Problem: Falls - Risk of:  Goal: Will remain free from falls  Description: Will remain free from falls  4/11/2020 0900 by Vivian Buchanan RN  Outcome: Ongoing  Pt fall risk, fall band present, falling star, safety alarm activated and in use as needed. Hourly rounding performed. Pt encouraged to use call light. See Char Gaffney fall risk assessment.

## 2020-04-11 NOTE — PROGRESS NOTES
History  Additional Comments: has been at rehab for ~ 6 months. Objective     Observation/Palpation  Observation: Pt motivated and very sweet    AROM RLE (degrees)  RLE AROM: WFL  AROM LLE (degrees)  LLE AROM : WFL  Strength RLE  Comment: 4-/5 grossly  Strength LLE  Comment: 4-/5 grossly         Bed mobility  Rolling to Left: Modified independent  Rolling to Right: Modified independent  Supine to Sit: Modified independent  Transfers  Sit to Stand: Minimal Assistance  Stand to sit: Minimal Assistance  Bed to Chair: Minimal assistance  Ambulation  Ambulation?: Yes  Ambulation 1  Surface: level tile  Device: Rolling Walker  Assistance: Minimal assistance  Quality of Gait: gait w/ heavy reliance on UE's for stability. Initially decreased step lengths and shuffle w/ complaints of ankle stiffness and pain. Gait Deviations: Slow Rosa; Shuffles  Distance: 35 ft x 1;      Balance  Posture: Fair  Sitting - Static: Good  Sitting - Dynamic: Good  Standing - Static: Fair;+  Standing - Dynamic: Fair   Standing balance activities min assist -> standing balance w/ lidya knees bent      Access Code: [email or web address]   URL: [email or web address]   Date: 04/11/2020   Prepared by: Minh Thompson     Exercises   Supine Gluteal Sets - 10 reps - 3 sets - 1x daily - 7x weekly   Supine Heel Slide - 10 reps - 3 sets - 1x daily - 7x weekly   Supine Hip Abduction - 10 reps - 3 sets - 1x daily - 7x weekly   Supine Quad Set - 10 reps - 3 sets - 1x daily - 7x weekly   Issued HEP - vision limitations - pt will need daughter to assist.      Plan   Plan  Times per week: 1-2 x/ day. 5-6x week. Current Treatment Recommendations: Strengthening, ADL/Self-care Training, Gait Training, Patient/Caregiver Education & Training, Balance Training, Neuromuscular Re-education, Functional Mobility Training, Endurance Training, Home Exercise Program, Positioning, Transfer Training, Safety Education & Training  Safety Devices  Type of devices:  All fall risk

## 2020-04-11 NOTE — PROGRESS NOTES
Currently in Pain: Denies  Vital Signs  Temp: 97.7 °F (36.5 °C)  Temp Source: Oral  Pulse: 63  Heart Rate Source: Monitor  Resp: 16  BP: (!) 107/46  BP Location: Right Arm  BP Upper/Lower: Upper  MAP (mmHg): (!) 61  Patient Currently in Pain: Denies  Oxygen Therapy  SpO2: 99 %  O2 Device: None (Room air)  Social/Functional History  Social/Functional History  Type of Home: Facility[facility])  Home Layout: One level  Home Equipment: [address] Help From: Personal care attendant  ADL Assistance: Needs assistance(assist all ADLs, toileting)  Homemaking Assistance: Needs assistance  Ambulation Assistance: Needs assistance(pt states he uses w/c for primary mode of mobility, occ RW with staff)  Transfer Assistance: Needs assistance  Active : No  Occupation: Retired  Type of occupation:    Additional Comments: has been at rehab for ~ 6 months. Objective   Vision: Impaired  Vision Exceptions: Legally blind(almost blind in R eye. Uses magnifying glass when reading. )  Hearing: Exceptions to [facility]  Hearing Exceptions: Hard of hearing/hearing concerns    Orientation  Overall Orientation Status: Within Functional Limits  Observation/Palpation  Posture: Fair(flexed, kyphotic posture)  Observation: Pt motivated and very sweet  Balance  Sitting Balance: Stand by assistance  Standing Balance: Minimal assistance  Functional Mobility  Functional - Mobility Device: Rolling Walker  Activity: To/from bathroom  Assist Level: Minimal assistance  Functional Mobility Comments: pt needing assist to guide walker, to navigate obstacles safely, and cues d/t vision deficits. Pt relies on RW for support and fatigues easily. Toilet Transfers  Equipment Used: Standard toilet  Toilet Transfer: Minimal assistance  ADL  Feeding: Setup; Independent  Grooming: Contact guard assistance;Minimal assistance(pt needing inc. assist d/t low vision; set up and needing items handed to pt to sequence.  CGA

## 2020-04-11 NOTE — CONSULTS
[facility] Cardiology   Consult Note             Date:   4/11/2020  Patient name: Steven Sanford  Date of admission:  4/10/2020  6:06 PM  MRN:   [id number]  YOB: 1934    Reason for Admission: Altered mental status and possible GI bleed  Indication for consult. Abnormal troponin    History Obtained From:  patient, electronic medical record    HISTORY OF PRESENT ILLNESS:    Steven Sanford is a 80 y.o. male who presents to the emergency room via EMS from [facility], presenting for altered mental status and possible GI bleed. [facility] reports patient altered mental status for the past 4 days, states patient had a negative urinalysis 4 days prior, has had some dark to black-colored diarrhea-like product since evening prior. Patient does have a history of anemia, no reported gross bloody diarrhea. Patient is not reported to be on blood thinners. No reported trauma or falls, no reported fever, no reported vomiting. Patient baseline mentation normally ANO x4, resides in [facility] due to being legally blind   During admission his troponin was checked which was found to be elevated. Cardiology was consulted to help manage him for possible nstemi. He is currently sitting in a chair. He denies any history of coronary artery disease. Denies any chest pain orthopnea or paroxysmal nocturnal dyspnea. He currently feels well with no active complaints. Past Medical History:   has a past medical history of Back pain, Blind right eye, Diabetes mellitus ([address] Diverticulosis, Glaucoma, Hernia, ventral, and Hypothyroidism (acquired). Past Surgical History:   has a past surgical history that includes Brain aneurysm surgery (1953); Appendectomy (1951); Tonsillectomy; Cataract removal; Colonoscopy (2004); Eye surgery; other surgical history (05-20-16); ERCP (08/02/2017); and ERCP (N/A, 8/2/2017). Home Medications:    Prior to Admission medications    Medication Sig Start Date End Date Taking?  Authorizing Provider infection)    Disorientation       RECOMMENDATIONS:  1. Abnormal troponins  2. Acute renal failure  3. Hypertension  4. UTI  Plan  Patient has elevated troponin which has been flat. He has no signs and symptoms of acute coronary syndrome and is completely chest pain-free. Troponins has also not increased and therefore this is most likely due to renal impairment and  sepsis. He has markedly improved and I will discontinue heparin at this time since it is not needed. Continue aspirin 81 mg daily. From a cardiology standpoint patient can be discharged after an echocardiogram is done    Discussed with patient and nursing.     Dee Butler MD  [facility] cardiology

## 2020-04-11 NOTE — DISCHARGE INSTR - COC
Continuity of Care Form    Patient Name: Steven Sanford   :  1934  MRN:  [id number]    Admit date:  4/10/2020  Discharge date:  20    Code Status Order: Full Code   Advance Directives:   [address] [facility] Documentation     Date/Time Healthcare Directive Type of Healthcare Directive Copy in [address] [place] [address] [address] Agent's Name Healthcare Agent's Phone Number    04/10/20 1818  No, patient does not have an advance directive for healthcare treatment -- -- -- -- --          Admitting Physician:  Susie Chilel MD  PCP: Bobby Munoz MD    Discharging Nurse: [facility] Unit/Room#: 1008/1008-02  Discharging Unit Phone Number: [phone]    Emergency Contact:   Extended Emergency Contact Information  Primary Emergency Contact: [address] [place] [address] Phone: [phone]  Mobile Phone: [phone]  Relation: Child  Secondary Emergency Contact: Kike Fagan   [address] Phone: [phone]  Mobile Phone: [phone]  Relation: Child    Past Surgical History:  Past Surgical History:   Procedure Laterality Date   Cecy Enriquez      Dr. Pepe Goldstein    ERCP  2017    balloon sweep,    ERCP N/A 2017    ERCP ENDOSCOPIC RETROGRADE CHOLANGIOPANCREATOGRAPHY WITH BALLOON SWEEP AND CHOLANGIOGRAM, GI UNIT, C-ARM  performed by Montez Caballero MD at [address]      blood in the eyeball    OTHER SURGICAL HISTORY  16    Laparoscopic selena. converted to open selena.     TONSILLECTOMY         Immunization History:   Immunization History   Administered Date(s) Administered    Influenza Vaccine, unspecified formulation 2016    Influenza Virus Vaccine 2013, 10/29/2015, 10/17/2019    Influenza, Quadv, IM, PF (6 mo and older Fluzone, Flulaval, Fluarix, and 3 yrs and older Afluria) 10/16/2017, 10/08/2018 Active Problems:  Patient Active Problem List   Diagnosis Code    Essential hypertension, benign I10    [facility] J44.9    Pure hypercholesterolemia E78.00    Esophageal reflux K21.9    Diverticulosis K57.90    Restless leg syndrome G25.81    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    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, 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.3    Acquired hypothyroidism E03.9    Sacroiliitis ([facility]) M46.1    Radiculopathy, lumbar region M54.16    NSTEMI (non-ST elevated myocardial infarction) ([facility]) I21.4    UTI (urinary tract infection) N39.0    Disorientation R41.0       Isolation/Infection:   Isolation          No Isolation        Patient Infection Status     None to display          Nurse Assessment:  Last Vital Signs: BP (!) 107/46   Pulse 63   Temp 97.7 °F (36.5 °C) (Oral)   Resp 16   Ht 5' 5\" (1.651 m)   Wt 147 lb 8 oz (66.9 kg)   SpO2 99%   BMI 24.55 kg/m²     Last documented pain score (0-10 scale): Pain Level: 2  Last Weight:   Wt Readings from Last 1 Encounters:   04/10/20 147 lb 8 oz (66.9 kg)     Mental Status:  alert and oriented, but very forgetful    IV Access:  none  Nursing Mobility/ADLs:  Walking   Assisted  Transfer  Assisted  Bathing  Assisted  Dressing  Assisted  Toileting  Assisted  Feeding  Assisted for set up  Med Admin  Assisted  Med Delivery   whole    Wound Care Documentation and Therapy:        Elimination:  Continence:   · Bowel: Yes  · Bladder: Yes  Urinary Catheter: None   Colostomy/Ileostomy/Ileal Conduit: No       Date of Last BM: 4/11/20    Intake/Output Summary (Last 24 hours) at 4/11/2020 1416  Last data filed at 4/11/2020 0441  Gross per 24 hour   Intake --   Output 200 ml   Net -200 ml     I/O last 3 completed shifts:  In: -   Out: 200 [Urine:200]    Safety Concerns: At Risk for Falls    Impairments/Disabilities:      Vision blind right eye.     Nutrition Therapy:  Current Nutrition Therapy:   - Oral Diet: General    Routes of Feeding: Oral  Liquids: No Restrictions  Daily Fluid Restriction: no  Last Modified Barium Swallow with Video (Video Swallowing Test): not done    Treatments at the Time of Hospital Discharge:   Respiratory Treatments: none  Oxygen Therapy:  is not on home oxygen therapy. Ventilator:    - No ventilator support    Rehab Therapies: Physical Therapy and occupation therapy  Weight Bearing Status/Restrictions: No weight bearing restirctions  Other Medical Equipment (for information only, NOT a DME order):  wheelchair and walker  Other Treatments: none    Patient's personal belongings (please select all that are sent with patient):  None    RN SIGNATURE:  Electronically signed by Piyush Holland RN on 4/11/20 at 2:37 PM EDT    CASE MANAGEMENT/SOCIAL WORK SECTION    Inpatient Status Date: ***    Readmission Risk Assessment Score:  Readmission Risk              Risk of Unplanned Readmission:        17           Discharging to Facility/ Agency   · Name:   · Address:  · Phone:  · Fax:    Dialysis Facility (if applicable)   · Name:  · Address:  · Dialysis Schedule:  · Phone:  · Fax:    / signature: {Esignature:[id number]}    PHYSICIAN SECTION    Prognosis: Good    Condition at Discharge: Stable    Rehab Potential (if transferring to Rehab): Good    Recommended Labs or Other Treatments After Discharge: Follow final urine culture result which may be available in 1 to 2 days, PT OT    Physician Certification: I certify the above information and transfer of Yao Alcocer  is necessary for the continuing treatment of the diagnosis listed and that he requires [facility] for greater 30 days.      Update Admission H&P: No change in H&P    PHYSICIAN SIGNATURE:  Electronically signed by Mary Hernandez MD on 4/11/20 at 2:17 PM EDT

## 2020-04-11 NOTE — PROGRESS NOTES
190  Chest x-ray suggestive more for atelectasis than pneumonia as per report  EKG findings are unchanged from previous reports      Review of Systems:     Constitutional:  negative for chills, fevers, sweats  Respiratory:  negative for cough, dyspnea on exertion, shortness of breath, wheezing  Cardiovascular:  negative for chest pain, chest pressure/discomfort, lower extremity edema, palpitations  Gastrointestinal:  negative for abdominal pain, constipation, diarrhea, nausea, vomiting  Neurological:  negative for dizziness, headache    Medications: Allergies: Allergies   Allergen Reactions    Aspirin Other (See Comments)     States GI upset only with more than one low dose aspirin    Codeine Itching    Penicillins        Current Meds:   Scheduled Meds:    atorvastatin  20 mg Oral Nightly    polyvinyl alcohol  2 drop Both Eyes BID    ferrous sulfate  325 mg Oral BID    gabapentin  300 mg Oral TID    magnesium oxide  400 mg Oral Daily    melatonin  3 mg Oral Nightly    montelukast  10 mg Oral Nightly    pantoprazole  40 mg Oral QAM AC    potassium chloride  20 mEq Oral Daily    rOPINIRole  0.5 mg Oral BID    tamsulosin  0.4 mg Oral Daily    sodium chloride flush  10 mL Intravenous 2 times per day    aspirin  81 mg Oral Daily    ciprofloxacin  200 mg Intravenous Q12H     Continuous Infusions:    sodium chloride 100 mL/hr at 04/10/20 2122    heparin (porcine) Stopped (04/11/20 0600)     PRN Meds: oxyCODONE-acetaminophen, sodium chloride flush, acetaminophen **OR** acetaminophen, polyethylene glycol, promethazine **OR** ondansetron, nicotine, heparin (porcine), heparin (porcine)    Data:     Past Medical History:   has a past medical history of Back pain, Blind right eye, Diabetes mellitus ([address] Diverticulosis, Glaucoma, Hernia, ventral, and Hypothyroidism (acquired). Social History:   reports that he has never smoked.  He has never used smokeless tobacco. He reports that he does not drink MD  4/11/2020  7:56 AM

## 2020-04-20 NOTE — ED NOTES
Randall Gross RN from the [facility] notified that patients CT scan was negative per Dr. Shamika Yadav. The patient is being discharged to [facility] and they are sending transportation to come pick him up.        Bradley Chavez RN  04/20/20 9822

## 2020-04-20 NOTE — PROGRESS NOTES
Daughter calls in and is updated with new orders. Daughter is tearful and states that she is not sure what is going on with her father. This nurse speaks at length with daughter and also discusses code status. Daughter states she wishes for pt to remain a full code. Daughter also states that if pt can not stay here she would like him to go to [facility] [place]. [facility].

## 2020-04-20 NOTE — ED PROVIDER NOTES
[facility]  eMERGENCY dEPARTMENT eNCOUnter          [facility]       Chief Complaint   Patient presents with    Abdominal Pain     Pt presents from Nursing home for concern of GI bleed. Pt present with what looks like an abdominal hernia on the upper abdomen. Nurses Notes reviewed and I agree except as noted in the HPI. HISTORY OF PRESENT ILLNESS    Clif Tejeda is a 80 y.o. male who presents to the emergency room via EMS from [facility], with ECF staff concern for \"internal bleeding\", feel that patient's abdomen is distended and firm, is a concern for GI bleed and altered mental status. No reported fever no reported trauma or falls, patient is reported to have had all morning medications and this is reflected in the [facility] presented from [facility] via EMS staff. Patient is awake and with loud voice does respond, denies any chest pain or pain otherwise, however not expand on or voluntarily initiate additional questions or answers. REVIEW OF SYSTEMS     Review of Systems   Unable to perform ROS: Mental status change          PAST MEDICAL HISTORY    has a past medical history of Back pain, Blind right eye, Constipation, COPD (chronic obstructive pulmonary disease) ([address] Diabetes mellitus ([address] Diverticulosis, GERD (gastroesophageal reflux disease), Glaucoma, Hernia, ventral, Hyperlipidemia, Hypothyroidism (acquired), Iron deficiency anemia, Kidney disease, chronic, stage III (moderate, EGFR 30-59 ml/min) ([address] Osteomyelitis of lumbar vertebra ([address] and Restless leg syndrome. SURGICAL HISTORY      has a past surgical history that includes Brain aneurysm surgery (1953); Appendectomy (1951); Tonsillectomy; Cataract removal; Colonoscopy (2004); Eye surgery; other surgical history (05-20-16); ERCP (08/02/2017); and ERCP (N/A, 8/2/2017).     CURRENT MEDICATIONS       Current Discharge Medication List      CONTINUE these medications which have NOT CHANGED    Details   ferrous sulfate (FE TABS 325) 325 (65 Fe) MG EC tablet Take 325 mg by mouth 2 times daily      aspirin 81 MG chewable tablet Take 1 tablet by mouth daily  Qty: 30 tablet, Refills: 3      Carboxymethylcellulose Sodium (ARTIFICIAL TEARS OP) Apply 1 drop to eye 2 times daily Both eyes      atorvastatin (LIPITOR) 20 MG tablet Take 20 mg by mouth nightly      docusate sodium (COLACE) 100 MG capsule Take 100 mg by mouth 2 times daily      gabapentin (NEURONTIN) 300 MG capsule Take 300 mg by mouth 3 times daily. Magnesium 400 MG TABS Take 1 tablet by mouth daily      melatonin 3 MG TABS tablet Take 3 mg by mouth nightly      polyethylene glycol (GLYCOLAX) powder Take 17 g by mouth daily      tamsulosin (FLOMAX) 0.4 MG capsule Take 0.4 mg by mouth every evening       omeprazole (PRILOSEC) 20 MG delayed release capsule Take 20 mg by mouth every morning (before breakfast)       acetaminophen (TYLENOL) 500 MG tablet Take 1,000 mg by mouth every 8 hours       rOPINIRole (REQUIP) 0.5 MG tablet Take 1 tablet by mouth 2 times daily  Qty: 180 tablet, Refills: 1    Associated Diagnoses: Restless leg syndrome      montelukast (SINGULAIR) 10 MG tablet take 1 tablet by mouth once daily  Refills: 0      albuterol (PROVENTIL) (2.5 MG/3ML) 0.083% nebulizer solution Take 2.5 mg by nebulization every 4 hours as needed for Shortness of Breath      ondansetron (ZOFRAN-ODT) 4 MG disintegrating tablet Take 4 mg by mouth every 6 hours as needed for Nausea or Vomiting             ALLERGIES     is allergic to aspirin; codeine; and penicillins. FAMILY HISTORY     He indicated that his mother is . He indicated that his father is . He indicated that the status of his other is unknown.   family history includes Cancer in his mother, sister, sister, and another family member. SOCIAL HISTORY      reports that he has never smoked. He has never used smokeless tobacco. He reports that he does not drink alcohol or use drugs.     PHYSICAL diaphoretic. Resorbing bruise noted over patient's left lateral upper eyelid with intact integument, there are several ecchymosis areas noted on hard points of the extremities, healing skin tear noted on the left lateral elbow  Psychiatric: Patient appears somewhat quiet and withdrawn, speech is fluent he does answer questions appropriately elevated some apparent altered mentation though this may be patient baseline    DIFFERENTIAL DIAGNOSIS:   GIB, UTI, PNA, encephalopathy NOS, infection NOS, CVA/TIA    DIAGNOSTIC RESULTS     EKG: All EKG's are interpreted by the Emergency Department Physician who either signs or Co-signs this chart in the absence of a cardiologist.  EKG    The patient had an EKG which is interpreted by me in the absence of a Cardiologist.   [] Without comparison to previous. [] With comparison to a previous EKG Dated   Rhythm: Sinus  Rate: 84  Axis: Left axis  ST Segments: Nonspecific ST changes  T Waves: Nonspecific  Clinical Impression: Sinus rhythm nonspecific ST changes no interval change for the patient. RADIOLOGY: non-plain film images(s) such as CT, Ultrasound and MRI are read by the radiologist.  [address]   Final Result   1. Limited exam due to image degradation is streak artifact from patient motion. 2. Atrophy along with ischemic small vessel disease. 3. 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 4. There is a calcified nodular density in the suprasellar region which is    unchanged from the previous exam of 4/16/2016.   5. Intracranial atherosclerosis. 5. Grossly no infarct or hemorrhage is noted. XR CHEST PORTABLE   Final Result   1. Low lung volumes. 2. Right lower lobe airspace opacity with likely tiny right pleural effusion. There is also mild left lower lobe glass opacity. Findings have not    significantly changed since the previous examination. 2. Stable cardiomediastinal silhouette. LABS:   Labs Reviewed   CBC WITH AUTO DIFFERENTIAL - Abnormal; Notable for the following components:       Result Value    RBC 2.57 (*)     Hemoglobin 8.3 (*)     Hematocrit 25.1 (*)     Absolute Lymph # 0.90 (*)     All other components within normal limits   COMPREHENSIVE METABOLIC PANEL W/ REFLEX TO MG FOR LOW K - Abnormal; Notable for the following components:    Glucose 128 (*)     BUN 39 (*)     CREATININE 1.49 (*)     Alkaline Phosphatase 177 (*)     AST 40 (*)     GFR Non- 45 (*)     GFR  54 (*)     All other components within normal limits   TROPONIN - Abnormal; Notable for the following components:    Troponin T 0.11 (*)     All other components within normal limits   PROTIME-INR - Abnormal; Notable for the following components:    Protime 12.0 (*)     All other components within normal limits   URINALYSIS - Abnormal; Notable for the following components:    Urine Hgb TRACE (*)     Protein, UA 1+ (*)     All other components within normal limits   POCT OCCULT BLOOD STOOL NON CA SCREEN - Normal   MICROSCOPIC URINALYSIS       EMERGENCY DEPARTMENT COURSE:   Vitals:    Vitals:    04/20/20 0740 04/20/20 0800 04/20/20 0922   BP: (!) 197/78 (!) 164/57    Pulse: 78 81 98   Resp: 18 12 17   Temp: 96.9 °F (36.1 °C)     TempSrc: Axillary     SpO2: 100% 98% 100%   Weight: 148 lb (67.1 kg)       Patient presents via EMS from [facility], brought to ER room 4 and transferred to bed, initial primary and secondary survey performed, initial orders placed including EKG, chest x-ray, blood and urine studies, IV access. Patient placed on cardiac monitor, pulse ox, resting right lateral recumbent with knees flexed 90 degrees and hips flexed 45 degrees. Patient appears in no acute distress.     EMR reviewed, including patient ED visit 4/10/2020 to this facility, patient was transferred to [facility] [address] in [place], [place] for possible end STEMI, acute cystitis hematuria, AMS,, or discharge diagnosis of

## 2020-04-20 NOTE — PROGRESS NOTES
Straight cath complete. 900 ml clear, yellow urine returned. Pt tolerates without complaints. Carmen care complete - pt had dry stool on buttocks. New brief applied.

## 2020-04-20 NOTE — ED PROVIDER NOTES
Sister         colon    Cancer Other         colon    Cancer Sister         colon       SOCIAL HISTORY    Social History     Socioeconomic History    Marital status:      Spouse name: None    Number of children: None    Years of education: None    Highest education level: None   Occupational History    None   Social Needs    Financial resource strain: None    Food insecurity     Worry: None     Inability: None    Transportation needs     Medical: None     Non-medical: None   Tobacco Use    Smoking status: Never Smoker    Smokeless tobacco: Never Used   Substance and Sexual Activity    Alcohol use: No    Drug use: No    Sexual activity: None   Lifestyle    Physical activity     Days per week: None     Minutes per session: None    Stress: None   Relationships    Social connections     Talks on phone: None     Gets together: None     Attends Judaism service: None     Active member of club or organization: None     Attends meetings of clubs or organizations: None     Relationship status: None    Intimate partner violence     Fear of current or ex partner: None     Emotionally abused: None     Physically abused: None     Forced sexual activity: None   Other Topics Concern    None   Social History Narrative    None           Review of Systems:  Constitutional: Not able to obtain due to patient's mental status. PHYSICAL EXAM    VITAL SIGNS: BP (!) 149/42   Pulse 76   Temp 98.1 °F (36.7 °C) (Temporal)   Resp 21   Ht 5' 5\" (1.651 m)   Wt 148 lb (67.1 kg)   SpO2 98%   BMI 24.63 kg/m²    Constitutional: Elderly male confused hENT:  Normocephalic, Atraumatic, Bilateral external ears normal, Oropharynx moist, No oral exudates, Nose normal. Neck- Normal range of motion, No tenderness, Supple, No stridor. Eyes:  PERRL, EOMI, Conjunctiva normal, No discharge. Respiratory:  Normal breath sounds, No respiratory distress, No wheezing, No chest tenderness.    Cardiovascular:  Normal heart rate, Normal rhythm, No murmurs, No rubs, No gallops. GI:  Bowel sounds normal, Soft, No tenderness, No masses, No pulsatile masses. : External genitalia appear normal, No masses or lesions. No discharge. No CVA tenderness. Musculoskeletal:  Intact distal pulses, No edema, No tenderness, No cyanosis, No clubbing. Good range of motion in all major joints. No tenderness to palpation or major deformities noted. Back- No tenderness. Integument:  Warm, Dry, No erythema, No rash. Lymphatic:  No lymphadenopathy noted. Neurologic: Awake but confused no focal deficits. Psychiatric: Patient is restless EKG        RADIOLOGY    CT LUMBAR SPINE WO CONTRAST   Final Result      Findings concerning for discitis osteomyelitis at L4-L5. MR of the lumbar spine    with and without IV contrast is recommended for further evaluation. XR CHEST PORTABLE   Final Result      Stable right basilar atelectasis or infiltrate. PROCEDURES        Labs  Labs Reviewed   CBC WITH AUTO DIFFERENTIAL - Abnormal; Notable for the following components:       Result Value    RBC 2.60 (*)     Hemoglobin 8.4 (*)     Hematocrit 25.3 (*)     Absolute Lymph # 0.60 (*)     All other components within normal limits   COMPREHENSIVE METABOLIC PANEL - Abnormal; Notable for the following components:    Glucose 120 (*)     BUN 38 (*)     CREATININE 1.58 (*)     Bun/Cre Ratio 24 (*)     CO2 18 (*)     Alkaline Phosphatase 168 (*)     GFR Non- 42 (*)     GFR  51 (*)     All other components within normal limits   TROPONIN - Abnormal; Notable for the following components:    Troponin T 0.17 (*)     All other components within normal limits   LACTIC ACID, PLASMA - Abnormal; Notable for the following components:    Lactic Acid 3.2 (*)     All other components within normal limits   LACTIC ACID, PLASMA   SEDIMENTATION RATE             Summation      Patient Course: Patient is discussed with Dr. gastelum.   CT L-spine is ordered due to his prior history of lumbar spine compression fracture. Monique catheter is placed with immediate return of clear urine was drained with immediate return of 1000 ml. Patient is discussed with Dr. Linwood Paz. I discussed the patient with his daughter. Dispositions options were discussed. Patient will be sent home today to follow-up with infectious disease as an outpatient in next couple days. Patient is resting quietly. Does not appear agitated at the time of the discharge. Patient will be sent back to the nursing home with a Monique catheter. ED Medications administered this visit:  Medications - No data to display    New Prescriptions from this visit:    New Prescriptions    No medications on file       Follow-up:  No follow-up provider specified. Final Impression:   1. Urinary retention    2. Agitation    3.  Confusion               (Please note that portions of this note were completed with a voice recognition program.  Efforts were made to edit the dictations but occasionally words are mis-transcribed.)        Nicole Haynes MD  04/20/20 9666       Nicole Haynes MD  04/21/20 8779

## 2020-04-21 NOTE — ED NOTES
Report given to EMS squad. Patient sent home with rafal in place.       Argenis Mendiola RN  04/20/20 6027

## 2020-04-21 NOTE — CARE COORDINATION
-ACM planned to reach out to patient today for ED Follow Up/ COVID-19 at risk monitoring   -Noted patient is from [facility]  -Patient was discharged back to SNF       Care Coordination Plan of Care: This nurse Care Coordinator will plan to resolve episode due to patient residing in SNF.

## 2020-04-21 NOTE — TELEPHONE ENCOUNTER
[facility]. Informed nurse that follow up needed for patient next week. Informed nurse that patient will need to be seen in [place] office.   She stated she would talk with family and return call to office

## 2020-04-21 NOTE — ED NOTES
This RN attempted to call [place] [facility]x with busy signal to let staff know pt is returning to [facility].      Franky Howard RN  04/20/20 0250

## 2020-04-21 NOTE — TELEPHONE ENCOUNTER
----- Message from NOHELIA Mackay CNP sent at 4/20/2020 12:34 PM EDT -----  Regarding: Needs apt  Urinary retention.  Needs apt next week  ----- Message -----  From: Yumi Alonzo MD  Sent: 4/20/2020  11:38 AM EDT  To: Efrem Vee MD

## 2020-04-28 PROBLEM — R33.9 URINARY RETENTION: Status: ACTIVE | Noted: 2020-01-01

## 2020-04-28 PROBLEM — N40.1 BPH WITH OBSTRUCTION/LOWER URINARY TRACT SYMPTOMS: Status: ACTIVE | Noted: 2020-01-01

## 2020-04-28 PROBLEM — N13.8 BPH WITH OBSTRUCTION/LOWER URINARY TRACT SYMPTOMS: Status: ACTIVE | Noted: 2020-01-01

## 2020-04-28 PROBLEM — K59.00 CONSTIPATION: Status: ACTIVE | Noted: 2020-01-01

## 2020-05-05 NOTE — ED NOTES
Warm blanket x 2 given. Pt denies any pain or discomfort at this time. Pt resting with eyes closed. Call light in reach.      August Juárez RN  05/05/20 0573

## 2020-05-05 NOTE — ED PROVIDER NOTES
[facility]  eMERGENCY dEPARTMENT eNCOUnter          [address] [facility]       Chief Complaint   Patient presents with    Other     [address] sent over pt for low H&H. Pt is unsure why he is here. Nurses Notes reviewed and I agree except as noted in the HPI. HISTORY OF PRESENT ILLNESS    Hobert Irven Goodell is a 80 y.o. male who presents to the emergency room via EMS from [facility], is reported the patient had morning labs was found to have a hemoglobin of 6.9, sent to the emergency room for reevaluation and possible blood transfusion. Patient denies any pain denies any chest palpitations or shortness of breath. REVIEW OF SYSTEMS     Review of Systems   Respiratory: Negative for chest tightness and shortness of breath. Cardiovascular: Negative for chest pain and palpitations. All other systems reviewed and are negative. Patient is legally blind    PAST MEDICAL HISTORY    has a past medical history of Back pain, Blind right eye, Constipation, COPD (chronic obstructive pulmonary disease) ([address] Diabetes mellitus ([address] Diverticulosis, GERD (gastroesophageal reflux disease), Glaucoma, Hernia, ventral, Hyperlipidemia, Hypothyroidism (acquired), Iron deficiency anemia, Kidney disease, chronic, stage III (moderate, EGFR 30-59 ml/min) ([address] Osteomyelitis of lumbar vertebra ([address] and Restless leg syndrome. SURGICAL HISTORY      has a past surgical history that includes Brain aneurysm surgery (1953); Appendectomy (1951); Tonsillectomy; Cataract removal; Colonoscopy (2004); Eye surgery; other surgical history (05-20-16); ERCP (08/02/2017); and ERCP (N/A, 8/2/2017).     CURRENT MEDICATIONS       Discharge Medication List as of 5/5/2020  3:07 PM      CONTINUE these medications which have NOT CHANGED    Details   ferrous sulfate (FE TABS 325) 325 (65 Fe) MG EC tablet Take 325 mg by mouth 2 times dailyHistorical Med      aspirin 81 MG chewable tablet Take 1 tablet by mouth daily, 99%.    Physical Exam   Constitutional: Patient is oriented to person, place, and time. Patient appears well-developed and well-nourished. Patient is active and cooperative. HENT:   Head: Normocephalic and atraumatic. Head is without contusion. Right Ear: Hearing and external ear normal. No drainage. Left Ear: Hearing and external ear normal. No drainage. Nose: Nose normal. No nasal deformity. No epistaxis. Mouth/Throat: Mucous membranes are not dry. Eyes:  Conjunctivae, sclera, and lids are normal. Right eye exhibits no discharge. Left eye exhibits no discharge. Neck: Full passive range of motion without pain and phonation normal.   Cardiovascular:  Normal rate, regular rhythm and intact distal pulses. Pulses: Right radial pulse  2+   Pulmonary/Chest: Effort normal. No tachypnea and no bradypnea. No wheezes, rhonchi, or rales. Abdominal: Soft. Patient without distension or tenderness  Rectal: No external abrasions fissures or hemorrhoids, normal rectal tone, digital rectal exam shows a dark slate gray stool that does not grossly bloody, fecal occult blood test negative with normal controls  Musculoskeletal:   Negative acute trauma or deformity,  apparent full range of motion and normal strength all extremities appropriate to age. Neurological: Patient is alert and oriented to person, place, and time. patient displays no tremor. Patient displays no seizure activity. .    Skin: Skin is warm and dry. Patient is not diaphoretic. Patient appears pale. There is several ecchymosis noted on exposed forearms with otherwise intact integument  Psychiatric: Patient has a normal mood and affect.  Patient speech is normal and behavior is normal.     DIFFERENTIAL DIAGNOSIS:   Anemia    DIAGNOSTIC RESULTS           RADIOLOGY: non-plain film images(s) such as CT, Ultrasound and MRI are read by the radiologist.  No orders to display       LABS:   Labs Reviewed   CBC WITH AUTO DIFFERENTIAL - Abnormal; Notable

## 2020-05-10 PROBLEM — N39.0 UTI (URINARY TRACT INFECTION): Status: RESOLVED | Noted: 2020-01-01 | Resolved: 2020-01-01

## 2020-05-12 NOTE — PROGRESS NOTES
HPI:    Patient is a 80 y.o. male in no acute distress. He is alert and oriented to person, place, and time. History  New patient referral from Dr. Alli Macias in the ER for urinary retention. He is here with his daughter. He does have dementia. He is a resident at [facility]. He was evaluated in the ER on 4/20/2020 due to abdominal pain. He was found to be in urinary retention. He was straight cathed for 900 mL of clear urine. He then returned to the ER again that afternoon with altered mental status and fall. A Monique catheter was placed at that time for 1000 mL. He did wear a brief prior to Monique catheter placement. It is unclear how often he was incontinent. He has never been on medication for lower urinary tract symptoms. He does admit to constipation. His daughter reports when he was living at home he did not have lower urinary tract symptoms to her knowledge, but he did regularly take stool softeners. There is no history of gross hematuria or kidney stones. He has never seen urology in the past.  He has been on Flomax since 4/11/2020. He does have history of chronic kidney disease stage III. Renal function from 4/20/2020 reviewed: BUN 38, creatinine 1.58, GFR 82. Today  Patient returns the office today for PVR post Monique removal at facility, follow-up of constipation and BPH with LUTS. Patient is currently at [address] [place] [address] and rehab. Monique was removed approximately 10:30 AM 5/12/2020. Patient was able to void approximately 10 mL in the office with a postvoid residual of 243 mL. We did contact the nursing facility and they stated that he did make a trip to the bathroom about a half an hour to 45 minutes prior to leaving for this appointment. Regretfully they do not know if he was able to void or not. Patient does have dementia and is accompanied by a worker from the facility. We did review his medications he is taking MiraLAX, Colace and Flomax.   He does have daily bowel movements. Past Medical History:   Diagnosis Date    Back pain     Blind right eye     left eye poor vision also    Constipation     COPD (chronic obstructive pulmonary disease) (HCC)     Diabetes mellitus (HCC)     Diverticulosis     GERD (gastroesophageal reflux disease)     Glaucoma     Hernia, ventral     Hyperlipidemia     Hypothyroidism (acquired)     Iron deficiency anemia     Kidney disease, chronic, stage III (moderate, EGFR 30-59 ml/min) (HCC)     Osteomyelitis of lumbar vertebra (HCC)     Restless leg syndrome      Past Surgical History:   Procedure Laterality Date   [place]  2004    Dr. Chasidy Brannon    ERCP  08/02/2017    balloon sweep,    ERCP N/A 8/2/2017    ERCP ENDOSCOPIC RETROGRADE CHOLANGIOPANCREATOGRAPHY WITH BALLOON SWEEP AND CHOLANGIOGRAM, GI UNIT, C-ARM  performed by Jesica White MD at [address] [place] [address]      blood in the eyeball    OTHER SURGICAL HISTORY  05-20-16    Laparoscopic selena. converted to open selena.     TONSILLECTOMY       Outpatient Encounter Medications as of 5/12/2020   Medication Sig Dispense Refill    ferrous sulfate (FE TABS 325) 325 (65 Fe) MG EC tablet Take 325 mg by mouth 2 times daily      albuterol (PROVENTIL) (2.5 MG/3ML) 0.083% nebulizer solution Take 2.5 mg by nebulization every 4 hours as needed for Shortness of Breath      ondansetron (ZOFRAN-ODT) 4 MG disintegrating tablet Take 4 mg by mouth every 6 hours as needed for Nausea or Vomiting      aspirin 81 MG chewable tablet Take 1 tablet by mouth daily 30 tablet 3    Carboxymethylcellulose Sodium (ARTIFICIAL TEARS OP) Apply 1 drop to eye 2 times daily Both eyes      atorvastatin (LIPITOR) 20 MG tablet Take 20 mg by mouth nightly      docusate sodium (COLACE) 100 MG capsule Take 100 mg by mouth 2 times daily      gabapentin (NEURONTIN) 300 MG capsule Take 300 mg by mouth 3

## 2020-05-12 NOTE — PATIENT INSTRUCTIONS
SURVEY:    You may be receiving a survey from [employer] regarding your visit today. Please complete the survey to enable us to provide the highest quality of care to you and your family. If you cannot score us a very good on any question, please call the office to discuss how we could have made your experience a very good one. Thank you.

## 2020-05-15 NOTE — TELEPHONE ENCOUNTER
Fax an order for fosfomycin 1 packet once daily. Repeat dose every 72 hours for a total of three doses.

## 2020-09-10 NOTE — PROGRESS NOTES
Bladderscan performed in office today:  Patient voided -, PVR - 731 mL    He voided after the scan, 150mL, second scan 0mL  He states he has a very large bowl movement and stomach felt better.

## 2020-09-10 NOTE — PATIENT INSTRUCTIONS
SURVEY:    You may be receiving a survey from [employer] regarding your visit today. Please complete the survey to enable us to provide the highest quality of care to you and your family. If you cannot score us a very good on any question, please call the office to discuss how we could of made your experience a very good one. Thank you.

## 2020-10-30 NOTE — PROGRESS NOTES
2315 2nd unit of PRBC complete. VSS. Pt tolerated well, no reaction noted. Lab notified of end time. [phone] labs drawn per order  0001Chad Noun with transport for [facility] called, pt assisted to side of bed to put on shoes. Nauseated with movement and vomited x1 of green bile, small amount. Pt states he does this sometimes and denies nausea after emesis. Finished getting dressed and assisted with urinal. IV to RFA d/c'd with intracath intact. No complication at site. Pressure held to site and guaze applied. Transported via personal WC to transport van. Stable at time of discharge with all belongings.    0030- update called to Jaimie Min at [employer]

## 2020-10-30 NOTE — DISCHARGE INSTR - COC
Continuity of Care Form    Patient Name: Clarissa Amanda   :  1934  MRN:  [id number]    Admit date:  10/29/2020  Discharge date:  ***    Code Status Order: Prior   Advance Directives:     Admitting Physician:  No admitting provider for patient encounter. PCP: Imelda Lee MD    Discharging Nurse: [facility] Unit/Room#: No information available for this encounter. Discharging Unit Phone Number: ***    Emergency Contact:   Extended Emergency Contact Information  Primary Emergency Contact: Chasity Castaneda [address] Phone: [phone]  Mobile Phone: [phone]  Relation: Child    Past Surgical History:  Past Surgical History:   Procedure Laterality Date   Arlene Jean      Dr. Wilner Hair    ERCP  2017    balloon sweep,    ERCP N/A 2017    ERCP ENDOSCOPIC RETROGRADE CHOLANGIOPANCREATOGRAPHY WITH BALLOON SWEEP AND CHOLANGIOGRAM, GI UNIT, C-ARM  performed [facility]d [facility] in the eyeball    OTHER SURGICAL HISTORY  16    Laparoscopic selena. converted to open selena.     TONSILLECTOMY         Immunization History:   Immunization History   Administered Date(s) Administered    Influenza Vaccine, unspecified formulation 2016    Influenza Virus Vaccine 2013, 10/29/2015, 10/17/2019    Influenza, Farzaneh Ashley, IM, PF (6 mo and older Fluzone, Flulaval, Fluarix, and 3 yrs and older Afluria) 10/16/2017, 10/08/2018       Active Problems:  Patient Active Problem List   Diagnosis Code    Essential hypertension, benign I10    COPD (chronic obstructive pulmonary disease) ([facility] [address] J44.9    Pure hypercholesterolemia E78.00    Esophageal reflux K21.9    Diverticulosis K57.90    Restless leg syndrome G25.81    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    CKD stage 3 due to type 2 diabetes mellitus ([address] E11.22, N18.30    Acquired hypothyroidism E03.9    Sacroiliitis (HCC) M46.1    Radiculopathy, lumbar region M54.16    NSTEMI (non-ST elevated myocardial infarction) ([facility] [address] [facility] I21.4    Disorientation R41.0    Urinary retention R33.9    Constipation K59.00    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.1, N13.8       Isolation/Infection:   Isolation          No Isolation        Patient Infection Status     Infection Onset Added Last Indicated Last Indicated By Review Planned Expiration Resolved Resolved By    ESBL (Extended Spectrum Beta Lactamase) 05/12/20 05/14/20 05/12/20 Culture, Urine              Nurse Assessment:  Last Vital Signs: BP (!) 131/53   Pulse 61   Temp 98.2 °F (36.8 °C) (Oral)   Resp 20   Ht 5' 5\" (1.651 m)   Wt 165 lb (74.8 kg)   SpO2 95%   BMI 27.46 kg/m²     Last documented pain score (0-10 scale):    Last Weight:   Wt Readings from Last 1 Encounters:   10/29/20 165 lb (74.8 kg)     Mental Status:  {IP PT MENTAL STATUS:20030}    IV Access:  { JINNY IV ACCESS:868313660}    Nursing Mobility/ADLs:  Walking   {[facility] QDJO:140498038}  Transfer  {[facility] VRWK:817151431}  Bathing  {[facility] D[facility] NZED:136915652}  Dressing  {[facility] MOWD:935145257}  Toileting  {[facility] GZCW:783074491}  Feeding  {[facility] WHVJ:974432001}  Med Admin  {[facility] BRTS:159222143}  Med Delivery   {[facility] MED Delivery:739065377}    Wound Care Documentation and Therapy:        Elimination:  Continence:   · Bowel: {YES / FF:90547}  · Bladder: {YES / VI:42442}  Urinary Catheter: {Urinary Catheter:665296727}   Colostomy/Ileostomy/Ileal Conduit: {YES / IK:03249}       Date of Last BM: ***    Intake/Output Summary (Last 24 hours) at 10/30/2020 0143  Last data filed at 10/30/2020 0015  Gross per 24 hour   Intake 726 ml   Output 1600 ml   Net -874 ml     I/O last 3 completed shifts:   In: 377 [Blood:377]  Out: 850 [Urine:850]    Safety Concerns:     [address] Safety Concerns:254402992}    Impairments/Disabilities:      [address] Impairments/Disabilities:603060252}    Nutrition Therapy:  Current Nutrition Therapy:   [address] JINNY Diet List:250483730}    Routes of Feeding: {[facility] DME Other Feedings:917182460}  Liquids: {Slp liquid thickness:87982}  Daily Fluid Restriction: {CHP DME Yes amt example:830768487}  Last Modified Barium Swallow with Video (Video Swallowing Test): {Done Not Done VJLL:346934121}    Treatments at the Time of Hospital Discharge:   Respiratory Treatments: ***  Oxygen Therapy:  {Therapy; copd oxygen:22395}  Ventilator:    {[facility] Vent CNAP:925393814}    Rehab Therapies: {THERAPEUTIC INTERVENTION:8906355540}  Weight Bearing Status/Restrictions: { CC Weight Bearin}  Other Medical Equipment (for information only, NOT a DME order):  {EQUIPMENT:868202306}  Other Treatments: ***    Patient's personal belongings (please select all that are sent with patient):  {[facility] DME Belongings:110690003}    RN SIGNATURE:  {Esignature:615147283}    CASE MANAGEMENT/SOCIAL WORK SECTION    Inpatient Status Date: ***    Readmission Risk Assessment Score:  Readmission Risk              Risk of Unplanned Readmission:        0           Discharging to Facility/ Agency   · Name:   · Address:  · Phone:  · Fax:    Dialysis Facility (if applicable)   · Name:  · Address:  · Dialysis Schedule:  · Phone:  · Fax:    / signature: {Esignature:942077093}    PHYSICIAN SECTION    Prognosis: {Prognosis:2342202591}    Condition at Discharge: [address] Patient Condition:753429927}    Rehab Potential (if transferring to Rehab): {Prognosis:0998075878}    Recommended Labs or Other Treatments After Discharge: ***    Physician Certification: I certify the above information and transfer of Brandon Jaramillo  is necessary for the continuing treatment of the diagnosis listed and that he requires {Admit to Appropriate Level of Care:88984} for {GREATER/LESS:533431352} 30 days.      Update Admission H&P: [facility]07[facility]}    PHYSICIAN SIGNATURE:  {[facility]:432935451}

## 2020-12-02 NOTE — ED NOTES
This nurse called and spoke with Pt Daughter Aga Olivier. Rhona notified of patient being brought to the ER by [facility] and plan of care. This nurse told Aga Olivier that we would call with an update once we know what is going on.       Amarilys Bojorquez RN  12/02/20 0391

## 2020-12-02 NOTE — ED PROVIDER NOTES
BMI 27.4, soft, noted large abdominal wall hernia that is easily reducible, nontender  Musculoskeletal:   Negative acute trauma or deformity,  apparent full range of motion and normal strength all extremities appropriate to age. Neurological: Patient is alert and oriented to person, place (stated at hospital in [facility]). patient displays no tremor. Patient displays no seizure activity. .    Skin: Skin is warm and dry. Patient is not diaphoretic. Psychiatric: Patient has a normal mood and affect. Patient speech is normal and behavior is normal. Cognition and memory are normal.    DIFFERENTIAL DIAGNOSIS:   UTI, CVA/TIA, ICH, encephalopathy NOS,    DIAGNOSTIC RESULTS     EKG: All EKG's are interpreted by the Emergency Department Physician who either signs or Co-signs this chart in the absence of a cardiologist.  EKG    The patient had an EKG which is interpreted by me in the absence of a Cardiologist.   [] Without comparison to previous. [x] With comparison to a previous EKG Dated 4/20/2020    EKG @ 1746 hrs -S rhythm rate 63, left axis -46, normal intervals, as compared to prior EKG no significant changes morphology    EKG @ 1938 hrs -sinus rhythm rate 61, left axis -46, NJ QRS normal, , as compared to prior EKG slight increase in QTC otherwise no severe changes morphology      RADIOLOGY: non-plain film images(s) such as CT, Ultrasound and MRI are read by the radiologist.  XR CHEST PORTABLE   Final Result      Small amount of atelectasis at the lung bases without change. No failure or    pneumonia. CT Head WO Contrast   Final Result      Old left occipital infarct. Calcified nodular density in the suprasellar region unchanged as far back as    4/16/2016.                    LABS:   Labs Reviewed   CBC WITH AUTO DIFFERENTIAL - Abnormal; Notable for the following components:       Result Value    RBC 2.92 (*)     Hemoglobin 8.7 (*)     Hematocrit 26.5 (*)     RDW 18.7 (*) Platelets 833 (*)     All other components within normal limits   BASIC METABOLIC PANEL W/ REFLEX TO MG FOR LOW K - Abnormal; Notable for the following components:    Glucose 122 (*)     BUN 51 (*)     CREATININE 2.74 (*)     GFR Non- 22 (*)     GFR  27 (*)     All other components within normal limits   TROPONIN - Abnormal; Notable for the following components:    Troponin T 0.15 (*)     All other components within normal limits   URINALYSIS - Abnormal; Notable for the following components:    Bilirubin Urine 1+ (*)     Urine Hgb TRACE (*)     Protein, UA TRACE (*)     All other components within normal limits   COVID-19 - Abnormal; Notable for the following components:    SARS-CoV-2, Rapid DETECTED (*)     All other components within normal limits   TROPONIN - Abnormal; Notable for the following components:    Troponin T 0.12 (*)     All other components within normal limits   MICROSCOPIC URINALYSIS       EMERGENCY DEPARTMENT COURSE:   Vitals:    Vitals:    12/02/20 2337 12/02/20 2346 12/03/20 0001 12/03/20 0017   BP: 94/61 (!) 107/50 125/77 (!) 141/67   Pulse: 58 57 56 55   Resp: 17 18 15 17   Temp:       SpO2: 96% 96% 97% 100%     Patient history and physical exam taken at bedside, discussed patient symptoms and exam findings, discussed initial plan work-up to include CT head noncontrast, chest x-ray, EKG, blood and urine studies, IV access. Patient placed on cardiac monitor, pulse ox, sitting low semi-Fowlers in bed.     EKG and chest x-ray as above    Initial labs reviewed noting normal WBC and differential, remaining labs pending    CT radiology report reviewed    Additional labs reviewed, noting serum creatinine 2.74, BUN 31, normal electrolytes, troponin 0.15    Did request nursing perform a straight catheter to get urine sample from patient, interim will contact patient's daughter to discuss case    Case was discussed with patient's daughter Tim Cody, regarding patient's presentation work-up, noting patient's allergy troponin we did discuss possible causes, noting patient was seen in the emergency room 4/10/2020 with similar presentation, was found to be ALESSIO on CKD with underlying UTI, we are still awaiting urine sample but however as I cannot differentiate potential NSTEMI versus CKD would need to transfer to higher care facility, acknowledged    Rapid COVID-19 POSITIVE    Second troponin returned 0.12, noting patient has received 1 L IV fluids since arrival in ER. Review of EMR, patient was seen 4/10/2020 also noted elevated phone at that time, transfer to [facility], was discharged following day and was felt that troponin was secondary to ALESSIO on CKD and not cardiac related    Contacted Mahsa Carver, JOSE to determine who was on-call for the cardiology group, afterwards contacted Dr. Johnie Farmer, cardiology, discussed patient's other troponin and time a felt to be likely secondary to ALESSIO on CKD, similar to his presentation 8 months prior, that would need to transfer to their facility as patient is Covid positive, empty can and prior to being to the hospitalist, acknowledges and will see patient in consult as needed. Urinalysis reviewed, negative nitrite negative leukocytes on a  clean catch urine    Case was discussed with Dr. Johnie Farmer, cardiology at [facility], regarding patient's presentation current work-up, feelings of patient's elevated troponin likely secondary to ALESSIO on CKD, similar to his presentation 4/10/2020, with FTM patient dated to their facility through hospitalist for hydration and oxygen therapy, will see patient in consult as needed.     Case was discussed with Dr. Chavez Vallejo, hospitalist at [facility], regarding patient's presentation current work-up, including my conversation with cardiology as well as patient's ER visit and subsequent hospitalization 4/10/2024 with resultant diagnosis of ALESSIO and CKD causing elevated troponin, accepted for transfer    Upon arrival, EMS crew given report, patient packaged for transport    Critical Care Time: 30 minutes, including direct patient interaction, review of EMR, discussion with Dr. Ele Keenan, discussion with Dr. Eleuterio Fontaine      1. COVID-19 virus infection    2. Hypoxia    3. Acute kidney injury superimposed on chronic kidney disease ([facility] [address]          DISPOSITION/PLAN   trn    PATIENT REFERRED TO:  No follow-up provider specified. DISCHARGE MEDICATIONS:  Discharge Medication List as of 12/3/2020 12:30 AM              Summation      Patient Course:  trn    ED Medications administered this visit:  Medications - No data to display    New Prescriptions from this visit:    Discharge Medication List as of 12/3/2020 12:30 AM          Follow-up:  No follow-up provider specified. Final Impression:   1. COVID-19 virus infection    2. Hypoxia    3.  Acute kidney injury superimposed on chronic kidney disease ([facility] [address]               (Please note that portions of this note were completed with a voice recognition program.  Efforts were made to edit the dictations but occasionally words are mis-transcribed.)    MD Edis Elkins MD  12/03/20 6183

## 2020-12-03 PROBLEM — U07.1 COVID-19: Status: ACTIVE | Noted: 2020-01-01

## 2020-12-03 PROBLEM — E86.0 DEHYDRATION: Status: ACTIVE | Noted: 2020-01-01

## 2020-12-03 NOTE — ED NOTES
Writer used a straight catheter to obtain urine specimen at pt beside. Pt tolerated well.      Rossy Bryant RN  12/02/20 7712

## 2020-12-03 NOTE — PROGRESS NOTES
IGNACIA spoke with Wendy and pt's dtr Rhona to complete assessment. Attempted to contact pt with no answer in his room. Pt is a 80year old male admitted for covid 19 infection. Pt is from [facility]. Staff assists him as needed and he uses a wheelchair in their facility to get around. Facility provides transportation to any outside appointments. Pt is a full code and follows with Dr Kiesha Orozco as PCP in their facility. Pt has advance directives and dtr reports that she is HCDPOA. Copy requested from her. ACP note completed. Medications are affordable for pt. Plan for pt to return to [facility]  at discharge. Wendy is willing to accept whenever he is ready. No further needs expressed at this time. IGNACIA will continue to follow.  Blade GONZALES LSW 12/3/2020

## 2020-12-03 NOTE — ACP (ADVANCE CARE PLANNING)
Advance Care Planning     Advance Care Planning Activator (Inpatient)  Conversation Note      Date of ACP Conversation: 12/3/2020    Conversation Conducted with: Patient with [address]: Named in Advance Directive or Healthcare Power of  (name) Jessenia Williamson    ACP Activator: Irais Luis A    *When Decision Maker makes decisions on behalf of the incapacitated patient: Decision Maker is asked to consider and make decisions based on patient values, known preferences, or best interests. Health Care Decision Maker:     Current Designated Health Care Decision Maker:   Primary Decision Maker: Mandi Lima - Child - [phone]  (If there is a valid Health Care Decision Maker named in the \"Healthcare Decision Makers\" box in the ACP activity, but it is not visible above, be sure to open that field and then select the health care decision maker relationship (ie \"primary\") in the blank space to the right of the name.) Validate  this information as still accurate & up-to-date; edit [address] field as needed.)      Care Preferences    Ventilation: \"If you were in your present state of health and suddenly became very ill and were unable to breathe on your own, what would your preference be about the use of a ventilator (breathing machine) if it were available to you? \"      Would the patient desire the use of ventilator (breathing machine)?: yes    \"If your health worsens and it becomes clear that your chance of recovery is unlikely, what would your preference be about the use of a ventilator (breathing machine) if it were available to you? \"     Would the patient desire the use of ventilator (breathing machine)?: No, family would like to confer with each other prior to removal of vent      Resuscitation  \"CPR works best to restart the heart when there is a sudden event, like a heart attack, in someone who is otherwise healthy.  Unfortunately, CPR does not typically restart the heart for people who have serious health conditions or who are very sick. \"    \"In the event your heart stopped as a result of an underlying serious health condition, would you want attempts to be made to restart your heart (answer \"yes\" for attempt to resuscitate) or would you prefer a natural death (answer \"no\" for do not attempt to resuscitate)? \" yes      NOTE: If the patient has a valid advance directive AND now provides care preference(s) that are inconsistent with that prior directive, advise the patient to consider either: creating a new advance directive that complies with state-specific requirements; or, if that is not possible, orally revoking that prior directive in accordance with state-specific requirements, which must be documented in the EHR. [] Yes   [x] No   Educated Patient / Esiciagustin Meyers regarding differences between Advance Directives and portable DNR orders.     Length of ACP Conversation in minutes:      Conversation Outcomes:  [x] ACP discussion completed  [] Existing advance directive reviewed with patient; no changes to patient's previously recorded wishes  [] New Advance Directive completed  [] Portable Do Not Rescitate prepared for Provider review and signature  [] POLST/POST/MOLST/MOST prepared for Provider review and signature      Follow-up plan:    [] Schedule follow-up conversation to continue planning  [] Referred individual to Provider for additional questions/concerns   [] Advised patient/agent/surrogate to review completed ACP document and update if needed with changes in condition, patient preferences or care setting    [x] This note routed to one or more involved healthcare providers

## 2020-12-03 NOTE — PROGRESS NOTES
Informed ABDELRAHMAN Dey, platelet count 95, Lovenox contraindicated. Lovenox not given this am, await new orders. Latoya Allen CNP, also informed no home meds ordered at this time, aware.

## 2020-12-03 NOTE — H&P
History and Physical    Patient:  Brenda Benoit  MRN: [id number]    Chief Complaint:  SOB    History Obtained From:  patient, electronic medical record--some confusion with patient    PCP: Guillermina Ramos MD    History of Present Illness: The patient is a 80 y.o. male who presented to the ER with AMS from [facility]. They reported that he has had some lethargy over the last couple of days. They did not report fever, n/v/d. During his ER evaluation he was answering questions and knew where he was at. However in the ER it was reported that he was unresponsive on the toilet at the [facility] and the EMS was called. He was found to be bradycardic and atropine was given per EMS. He did respond to sternal rub at that time. On 11/27/2020 he was covid negative, however he is currently Covid positive. Past Medical History:        Diagnosis Date    Back pain     Blind right eye     left eye poor vision also    Constipation     COPD (chronic obstructive pulmonary disease) (HCC)     Diabetes mellitus (HCC)     Diverticulosis     GERD (gastroesophageal reflux disease)     Glaucoma     Hernia, ventral     Hyperlipidemia     Hypothyroidism (acquired)     Iron deficiency anemia     Kidney disease, chronic, stage III (moderate, EGFR 30-59 ml/min)     Osteomyelitis of lumbar vertebra (HCC)     Restless leg syndrome        Past Surgical History:        Procedure Laterality Date   Jame Mcgill  2004    Dr. Nikolay Mi    ERCP  08/02/2017    balloon sweep,    ERCP N/A 8/2/2017    ERCP ENDOSCOPIC RETROGRADE CHOLANGIOPANCREATOGRAPHY WITH BALLOON SWEEP AND CHOLANGIOGRAM, GI UNIT, C-ARM  performed by Dash Marrero MD at [address]      blood in the eyeball    OTHER SURGICAL HISTORY  05-20-16    Laparoscopic selena. converted to open selena.     TONSILLECTOMY         Family History:       Problem Relation Age of Onset  Cancer Mother         colon    Cancer Sister         colon    Cancer Other         colon    Cancer Sister         colon       Social History:   TOBACCO:   reports that he has never smoked. He has never used smokeless tobacco.  ETOH:   reports no history of alcohol use. ELICIT DRUG USE:    Social History     Substance and Sexual Activity   Drug Use No     OCCUPATION: retired      Allergies:  Aspirin; Codeine; and Penicillins    Medications Prior to Admission:    Prior to Admission medications    Medication Sig Start Date End Date Taking? Authorizing Provider   carvedilol (COREG) 3.125 MG tablet Take 3.125 mg by mouth 2 times daily 10/5/20   Historical Provider, MD   finasteride (PROSCAR) 5 MG tablet Take 5 mg by mouth daily 10/1/20   Historical Provider, MD   oxyCODONE-acetaminophen (PERCOCET) 5-325 MG per tablet Take 1 tablet by mouth nightly.  10/2/20   Historical Provider, MD   pantoprazole (PROTONIX) 40 MG tablet Take 20 mg by mouth 2 times daily 10/5/20   Historical Provider, MD   rOPINIRole (REQUIP) 0.5 MG tablet Take 0.5 mg by mouth 2 times daily 10/1/20   Historical Provider, MD   potassium chloride 20 MEQ/15ML (10%) oral solution Take 20 mEq by mouth daily    Historical Provider, MD   senna (SENOKOT) 8.6 MG tablet Take 1 tablet by mouth 2 times daily    Historical Provider, MD   ferrous sulfate (FE TABS 325) 325 (65 Fe) MG EC tablet Take 325 mg by mouth 2 times daily    Historical Provider, MD   albuterol (PROVENTIL) (2.5 MG/3ML) 0.083% nebulizer solution Take 2.5 mg by nebulization every 4 hours as needed for Shortness of Breath    Historical Provider, MD   ondansetron (ZOFRAN-ODT) 4 MG disintegrating tablet Take 4 mg by mouth every 6 hours as needed for Nausea or Vomiting    Historical Provider, MD   aspirin 81 MG chewable tablet Take 1 tablet by mouth daily 4/12/20   Malinda Martin MD   Carboxymethylcellulose Sodium (ARTIFICIAL TEARS OP) Apply 1 drop to eye 2 times daily Both eyes    Historical Provider, MD   atorvastatin (LIPITOR) 20 MG tablet Take 20 mg by mouth nightly    Historical Provider, MD   docusate sodium (COLACE) 100 MG capsule Take 100 mg by mouth 2 times daily    Historical Provider, MD   gabapentin (NEURONTIN) 300 MG capsule Take 300 mg by mouth 3 times daily. Historical Provider, MD   Magnesium 400 MG TABS Take 1 tablet by mouth daily    Historical Provider, MD   melatonin 3 MG TABS tablet Take 3 mg by mouth nightly    Historical Provider, MD   tamsulosin (FLOMAX) 0.4 MG capsule Take 0.4 mg by mouth every evening     Historical Provider, MD   acetaminophen (TYLENOL) 500 MG tablet Take 1,000 mg by mouth every 8 hours     Historical Provider, MD   rOPINIRole (REQUIP) 0.5 MG tablet Take 1 tablet by mouth 2 times daily 8/9/19 4/27/20  NOHELIA Hernandez - CNP   montelukast (SINGULAIR) 10 MG tablet take 1 tablet by mouth once daily 6/2/19   Historical Provider, MD       Review of Systems: Patient answers some of the questions  Constitutional:negative  for fevers, and negative for chills. ENT: negative for sore throat  Respiratory: negative for shortness of breath, negative for cough, and {Desc; negative/positive  Cardiovascular: negative for chest pain  Gastrointestinal: negative for abdominal pain, negative for nausea,negative for vomiting, negative for diarrhea, negative for constipation  Genitourinary: negative for dysuria      Physical Exam:    Vitals:   Temp: 96.4 °F (35.8 °C)  BP: (!) 126/47  Resp: 16  Pulse: 59  SpO2: 95 %  24HR INTAKE/OUTPUT:      Intake/Output Summary (Last 24 hours) at 12/3/2020 7430  Last data filed at 12/3/2020 1549  Gross per 24 hour   Intake 1192.83 ml   Output --   Net 1192.83 ml       Exam:  GEN:  alert and oriented to person, place currently, well-developed , in no acute distress, mouth is dry  EYES: No gross abnormalities. , PERRL and EOMI  NECK: normal, supple, no lymphadenopathy,  no carotid bruits  PULM: rales present-bilateral base  COR: regular rate & 04/10/2020    Radiculopathy, lumbar region 09/27/2019    Sacroiliitis ([facility] 04/16/2019    Acquired hypothyroidism 08/08/2017    CKD stage 3 due to type 2 diabetes mellitus ([facility] 12/15/2016    Blind right eye 06/08/2016    Diabetes mellitus ([facility] 01/05/2012    Essential hypertension, benign 01/05/2012    COPD (chronic obstructive pulmonary disease) ([facility] 01/05/2012    Pure hypercholesterolemia 01/05/2012    Esophageal reflux 01/05/2012    Diverticulosis 01/05/2012    Restless leg syndrome 01/05/2012       Plan:     · This patient requires inpatient admission because of Covid 19   · Factors affecting the medical complexity of this patient include Acute Resp Failure  · Estimated length of stay is 4 days  · Covid 19 / Acute Resp Failure  · IV Remdisivir  · Dexamethasone  · Acapella  · proning  · OOB with meals  · PT/OT  · Daily Labs  · Tele  · Continue SPO2 on tele  · Dehydration with ALESSIO   · Continue NS at 75 ml/hr  · I/O  · High risk medication monitoring: Remdisivir    CORE MEASURES  DVT prophylaxis: Lovenox  Decubitus ulcer present on admission: No  CODE STATUS: FULL CODE  Nutrition Status: good   Physical therapy: Yes   Old Charts reviewed: Yes  EKG Reviewed:  Yes  Advance Directive Addressed: Yes    NOHELIA Delcid, NP-C  12/3/2020, 6:57 PM

## 2020-12-03 NOTE — CONSULTS
Remdesivir Initiation & Daily Monitoring    Physician requesting remdesivir (use limited to ID): S Leonard    Demonstrated benefit is to shorten the duration of illness, mortality benefit has not been shown. Seems to be most beneficial early in the disease course. Criteria for use (confirm all are met): yes  Suspected/Confirmed COVID-19 positive and requiring hospitalization  Patient requires supplemental oxygen (this is the population for whom remdesivir demonstrated a shortened duration of illness; benefit less clear for those on high flow oxygen or mechanical ventilation, but may be reasonable to consider if change in respiratory status was recent)  Not recommended to be used in patients with baseline ALT 5x ULN or more    There are no PK data available for severe renal impairment (eGFR < 30 mL/min) or on RRT. Consider risk/benefit for individual patient. Baseline CrCl: Estimated Creatinine Clearance: 17 mL/min (A) (based on SCr of 2.74 mg/dL (H)). Results for Yamile Mittal (MRN [id number]) as of 12/3/2020 11:24   Ref. Range 12/2/2020 17:45   GFR Non- Latest Ref Range: >60 mL/min 22 (L)       Ensure LFTs are ordered at baseline & consider if monitoring during therapy is appropriate (this information is included in CMP or can be ordered separately). Package insert states: Perform hepatic laboratory testing in all patients before starting VEKLURY and while receiving VEKLURY as clinically appropriate. Baseline ALT: no baseline as of yet    Duration of therapy should be limited to 5 days. Anticipated stop date: 12/7/20    Also assess corticosteroid therapy: If patient has had symptoms for 7 days or more and is on supplemental oxygen, then a course of dexamethasone can also be considered. Dexamethasone indicated? unsure  If yes, is dexamethasone ordered?  yes  Consider baseline CRP (steroids may be beneficial if > 20 mg/L and may be harmful if < 10 mg/L): no new level  Marbella Rider.,

## 2020-12-03 NOTE — PROGRESS NOTES
Comprehensive Nutrition Assessment    Type and Reason for Visit:  Initial    Nutrition Recommendations/Plan: continue with current diet. Nutrition Assessment:  Altered nutrition related labs r/t endocrine dysfunction aeb A1c 8.2 10/16/2019. Glucose 122. Renal indices are elevated. H/H are low, but improved. Phone call to Pt room, Pt states he ate good for breakfast, but when asked how he ate at the nursing home, he gave the phone to the nurse. Pt from [address] [place] [address]. Pt with DM, currently glucose with good control, will monitor levels and evaluate need for diet modification. Moderate nutritional risk. Malnutrition Assessment:  Malnutrition Status:  Insufficient data    Context:  Acute Illness     Findings of the 6 clinical characteristics of malnutrition:  Energy Intake:  Unable to assess  Weight Loss:  No significant weight loss     Body Fat Loss:  Unable to assess     Muscle Mass Loss:  Unable to assess    Fluid Accumulation:  No significant fluid accumulation     Strength:  Not Performed    Estimated Daily Nutrient Needs:  Energy (kcal):  6046-3767(45-03/RK); Weight Used for Energy Requirements:  Current     Protein (g):  81-93g(1.3-1.5g/kg); Weight Used for Protein Requirements:  Ideal        Fluid (ml/day):  1610 ml; Method Used for Fluid Requirements:  1 ml/kcal      Nutrition Related Findings:  Unable to assess due to COVID 19      Wounds:  None       Current Nutrition Therapies:    DIET GENERAL; No Added Salt (3-4 GM)    Anthropometric Measures:  · Height: 5' 5\" (165.1 cm)  · Current Body Weight: 155 lb (70.3 kg)   · Admission Body Weight: 155 lb (70.3 kg)    · Usual Body Weight: 148 lb (67.1 kg)(4/27/20)     · Ideal Body Weight: 136 lbs; % Ideal Body Weight 114 %   · BMI: 25.8  · BMI Categories: Overweight (BMI 25.0-29. 9)       Nutrition Diagnosis:   · Altered nutrition-related lab values related to endocrine dysfuntion as evidenced by lab values      Nutrition Interventions:   Food and/or Nutrient Delivery:  Continue Current Diet  Nutrition Education/Counseling:  Education not appropriate   Coordination of Nutrition Care:  Continue to monitor while inpatient    Goals:  PO > 75% of meals     Lab Results   Component Value Date    LABA1C 8.2 10/16/2019     Recent Labs     12/02/20  1745      K 4.6      CO2 20   BUN 51*   CREATININE 2.74*   GLUCOSE 122*   GFR       NOT REPORTED      Lab Results   Component Value Date    LABALBU 3.3 05/05/2020    LABALBU 4.4 01/10/2012      Lab Results   Component Value Date    TRIG 109 05/29/2019    HDL 52 05/29/2019   No results for input(s): POCGLU in the last 72 hours.   Nutrition Monitoring and Evaluation:   Behavioral-Environmental Outcomes:  Knowledge or Skill   Food/Nutrient Intake Outcomes:  Food and Nutrient Intake  Physical Signs/Symptoms Outcomes:  Biochemical Data, Weight     Discharge Planning:    Continue current diet     Electronically signed by Earl Manrique RD, LD on 12/3/20 at 9:07 AM EST    Contact: [phone]

## 2020-12-03 NOTE — DISCHARGE INSTR - COC
Continuity of Care Form    Patient Name: Mami Alex   :  1934  MRN:  [id number]    Admit date:  12/3/2020  Discharge date:  20      Code Status Order: Full Code   Advance Directives:   [address] [facility] Documentation     Date/Time Healthcare Directive Type of Healthcare Directive Copy in [address] Agent's Name Healthcare Agent's Phone Number    20 [phone]  No, patient does not have an advance directive for healthcare treatment -- -- -- -- --          Admitting Physician:  Laureano Dawn MD  PCP: Karel Hair MD    Discharging Nurse: [facility] Unit/Room#: 2590/2454-39  Discharging Unit Phone Number: [phone]    Emergency Contact:   Extended Emergency Contact Information  Primary Emergency Contact: Ronnie Alford [address] Phone: [phone]  Mobile Phone: [phone]  Relation: Child    Past Surgical History:  Past Surgical History:   Procedure Laterality Date   Irene Goldberg      Dr. Jose L Nelson    ERCP  2017    balloon sweep,    ERCP N/A 2017    ERCP ENDOSCOPIC RETROGRADE CHOLANGIOPANCREATOGRAPHY WITH BALLOON SWEEP AND CHOLANGIOGRAM, GI UNIT, C-ARM  performed by Dash Hoyos MD at [address] [place] [address]      blood in the eyeball    OTHER SURGICAL HISTORY  16    Laparoscopic selena. converted to open selena.     TONSILLECTOMY         Immunization History:   Immunization History   Administered Date(s) Administered    Influenza Vaccine, unspecified formulation 2016    Influenza Virus Vaccine 2013, 10/29/2015, 10/17/2019    Influenza, Quadv, IM, PF (6 mo and older Fluzone, Flulaval, Fluarix, and 3 yrs and older Afluria) 10/16/2017, 10/08/2018       Active Problems:  Patient Active Problem List   Diagnosis Code    Diabetes mellitus ([facility] E11.9    Essential hypertension, benign I10    COPD (chronic obstructive pulmonary disease) ([facility]) J44.9    Pure hypercholesterolemia E78.00    Esophageal reflux K21.9    Diverticulosis K57.90    Restless leg syndrome G25.81    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    CKD stage 3 due to type 2 diabetes mellitus ([facility]) E11.22, N18.30    Acquired hypothyroidism E03.9    Sacroiliitis ([facility]) M46.1    Radiculopathy, lumbar region M54.16    NSTEMI (non-ST elevated myocardial infarction) ([facility] I21.4    Disorientation R41.0    Urinary retention R33.9    Constipation K59.00    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.1, N13.8    COVID-19 virus infection U07.1    Dehydration E86.0       Isolation/Infection:   Isolation          Droplet Plus        Patient Infection Status     Infection Onset Added Last Indicated Last Indicated By Review Planned Expiration Resolved Resolved By    COVID-19 12/02/20 12/02/20 12/02/20 COVID-19, PCR 12/11/20 12/16/20      S/S 12/2    ESBL (Extended Spectrum Beta Lactamase) 05/12/20 05/14/20 05/12/20 Culture, Urine              Nurse Assessment:  Last Vital Signs: /63   Pulse 69   Temp 99 °F (37.2 °C) (Temporal)   Resp 16   Ht 5' 5\" (1.651 m)   Wt 151 lb (68.5 kg)   SpO2 94%   BMI 25.13 kg/m²     Last documented pain score (0-10 scale): Pain Level: 0  Last Weight:   Wt Readings from Last 1 Encounters:   12/04/20 151 lb (68.5 kg)     Mental Status:  disoriented    IV Access:  - None    Nursing Mobility/ADLs:  Walking   Assisted  Transfer  Assisted  Bathing  Assisted  Dressing  Assisted  Toileting  Assisted  Feeding  Assisted  Med Admin  Assisted  Med Delivery   crushed and prefers mixed with applesauce    Wound Care Documentation and Therapy:        Elimination:  Continence:   · Bowel: No  · Bladder: No  Urinary Catheter: None   Colostomy/Ileostomy/Ileal Conduit: No       Date of Last BM: 12/05/20      Intake/Output Summary (Last 24 hours) at 12/4/2020 1722  Last data filed at 12/4/2020 1453  Gross per 24 hour Intake 1134.12 ml   Output 1825 ml   Net -690.88 ml     I/O last 3 completed shifts: In: 2087 [P.O.:600; I.V.:1487]  Out: 1825 [Urine:1825]    Safety Concerns: At Risk for Falls    Impairments/Disabilities:      Hearing    Nutrition Therapy:  Current Nutrition Therapy:   - Oral Diet:  General    Routes of Feeding: Oral  Liquids: No Restrictions  Daily Fluid Restriction: no  Last Modified Barium Swallow with Video (Video Swallowing Test): not done    Treatments at the Time of Hospital Discharge:   Respiratory Treatments: Albuterol inhaler  Oxygen Therapy:  is not on home oxygen therapy.   Ventilator:    - No ventilator support    Rehab Therapies: Physical Therapy and Occupational Therapy  Weight Bearing Status/Restrictions: No weight bearing restirctions  Other Medical Equipment (for information only, NOT a DME order):  walker  Other Treatments: none    Patient's personal belongings (please select all that are sent with patient):  Thong RAMIRES SIGNATURE:  Electronically signed by Kaveh Oleary RN on 12/5/20 at 11:18 AM EST    CASE MANAGEMENT/SOCIAL WORK SECTION    Inpatient Status Date: 12/3/2020    Readmission Risk Assessment Score:  Readmission Risk              Risk of Unplanned Readmission:        21           Discharging to Facility/ Agency   · Name: [facility]  · Address: [address]  · Phone: [phone]  · Fax: [phone]    Dialysis Facility (if applicable)   · Name:  · Address:  · Dialysis Schedule:  · Phone:  · Fax:    / signature: Electronically signed by BLAKE Yee on 12/3/20 at 12:19 PM EST    PHYSICIAN SECTION    Prognosis: {Prognosis:4027412386}    Condition at Discharge: [facility] Patient Condition:[id number]}    Rehab Potential (if transferring to Rehab): {Prognosis:7593285545}    Recommended Labs or Other Treatments After Discharge: ***    Physician Certification: I certify the above information and transfer of Clif AYALA Mathew Trinidad  is necessary for the continuing treatment of the diagnosis listed and that he requires [place] for greater 30 days.      Update Admission H&P: {CHP DME Changes in JQVAU:786152089}    PHYSICIAN SIGNATURE:  {Esignature:093162961}

## 2020-12-03 NOTE — PLAN OF CARE
Problem: Airway Clearance - Ineffective  Goal: Achieve or maintain patent airway  Outcome: Ongoing     Problem: Gas Exchange - Impaired  Goal: Absence of hypoxia  Outcome: Ongoing  Goal: Promote optimal lung function  Outcome: Ongoing     Problem: Breathing Pattern - Ineffective  Goal: Ability to achieve and maintain a regular respiratory rate  Outcome: Ongoing     Problem:  Body Temperature -  Risk of, Imbalanced  Goal: Ability to maintain a body temperature within defined limits  Outcome: Ongoing  Goal: Will regain or maintain usual level of consciousness  Outcome: Ongoing  Goal: Complications related to the disease process, condition or treatment will be avoided or minimized  Outcome: Ongoing     Problem: Isolation Precautions - Risk of Spread of Infection  Goal: Prevent transmission of infection  Outcome: Ongoing     Problem: Nutrition Deficits  Goal: Optimize nutrtional status  Outcome: Ongoing     Problem: Risk for Fluid Volume Deficit  Goal: Maintain normal heart rhythm  Outcome: Ongoing  Goal: Maintain absence of muscle cramping  Outcome: Ongoing  Goal: Maintain normal serum potassium, sodium, calcium, phosphorus, and pH  Outcome: Ongoing     Problem: Loneliness or Risk for Loneliness  Goal: Demonstrate positive use of time alone when socialization is not possible  Outcome: Ongoing     Problem: Fatigue  Goal: Verbalize increase energy and improved vitality  Outcome: Ongoing     Problem: Patient Education: Go to Patient Education Activity  Goal: Patient/Family Education  Outcome: Ongoing     Problem: Falls - Risk of:  Goal: Will remain free from falls  Description: Will remain free from falls  Outcome: Ongoing  Goal: Absence of physical injury  Description: Absence of physical injury  Outcome: Ongoing     Problem: Skin Integrity:  Goal: Will show no infection signs and symptoms  Description: Will show no infection signs and symptoms  Outcome: Ongoing  Goal: Absence of new skin breakdown  Description: Absence of new skin breakdown  Outcome: Ongoing     Problem: Confusion - Acute:  Goal: Absence of continued neurological deterioration signs and symptoms  Description: Absence of continued neurological deterioration signs and symptoms  Outcome: Ongoing  Goal: Mental status will be restored to baseline  Description: Mental status will be restored to baseline  Outcome: Ongoing     Problem: Discharge Planning:  Goal: Ability to perform activities of daily living will improve  Description: Ability to perform activities of daily living will improve  Outcome: Ongoing  Goal: Participates in care planning  Description: Participates in care planning  Outcome: Ongoing     Problem: Injury - Risk of, Physical Injury:  Goal: Will remain free from falls  Description: Will remain free from falls  Outcome: Ongoing  Goal: Absence of physical injury  Description: Absence of physical injury  Outcome: Ongoing     Problem: Mood - Altered:  Goal: Mood stable  Description: Mood stable  Outcome: Ongoing  Goal: Absence of abusive behavior  Description: Absence of abusive behavior  Outcome: Ongoing  Goal: Verbalizations of feeling emotionally comfortable while being cared for will increase  Description: Verbalizations of feeling emotionally comfortable while being cared for will increase  Outcome: Ongoing     Problem: Psychomotor Activity - Altered:  Goal: Absence of psychomotor disturbance signs and symptoms  Description: Absence of psychomotor disturbance signs and symptoms  Outcome: Ongoing     Problem: Sensory Perception - Impaired:  Goal: Demonstrations of improved sensory functioning will increase  Description: Demonstrations of improved sensory functioning will increase  Outcome: Ongoing  Goal: Decrease in sensory misperception frequency  Description: Decrease in sensory misperception frequency  Outcome: Ongoing  Goal: Able to refrain from responding to false sensory perceptions  Description: Able to refrain from responding to false sensory perceptions  Outcome: Ongoing  Goal: Demonstrates accurate environmental perceptions  Description: Demonstrates accurate environmental perceptions  Outcome: Ongoing  Goal: Able to distinguish between reality-based and nonreality-based thinking  Description: Able to distinguish between reality-based and nonreality-based thinking  Outcome: Ongoing  Goal: Able to interrupt nonreality-based thinking  Description: Able to interrupt nonreality-based thinking  Outcome: Ongoing     Problem: Sleep Pattern Disturbance:  Goal: Appears well-rested  Description: Appears well-rested  Outcome: Ongoing     Problem: Nutrition  Goal: Optimal nutrition therapy  Outcome: Ongoing

## 2020-12-04 NOTE — PROGRESS NOTES
[facility] Department of Pharmacy   Pharmacy Renal Adjustment Note    Jaylon Franklin is a 80 y.o. male. Pharmacist assessment of renally cleared medications. Recent Labs     12/02/20  1745 12/03/20  1148 12/04/20  0435   CREATININE 2.74* 2.27* 1.97*     Estimated Creatinine Clearance: 23 mL/min (A) (based on SCr of 1.97 mg/dL (H)).     Height:   Ht Readings from Last 1 Encounters:   12/03/20 5' 5\" (1.651 m)     Weight:  Wt Readings from Last 1 Encounters:   12/04/20 151 lb (68.5 kg)       The following medication has been adjusted based upon renal function:             Neurontin adjusted to 300 mg po bid for Crcl 15-29        Marbella Owens,12/4/2020,7:49 AM

## 2020-12-04 NOTE — PROGRESS NOTES
[facility] Department of Pharmacy   Pharmacy Renal Adjustment Note    Kaleb Sen is a 80 y.o. male. Pharmacist assessment of renally cleared medications. Recent Labs     12/02/20  1745 12/03/20  1148 12/04/20  0435   CREATININE 2.74* 2.27* 1.97*     Estimated Creatinine Clearance: 23 mL/min (A) (based on SCr of 1.97 mg/dL (H)).     Height:   Ht Readings from Last 1 Encounters:   12/03/20 5' 5\" (1.651 m)     Weight:  Wt Readings from Last 1 Encounters:   12/04/20 151 lb (68.5 kg)       The following medication has been adjusted based upon renal function:             Lovenox adjusted to 30 mg daily for Crcl < 30 ml/min        Marbella Owens,12/4/2020,7:47 AM

## 2020-12-04 NOTE — PROGRESS NOTES
Writer entered patient room. Patient had pulled out IV at this time. Coworker replaced RN while writer changed and cleaned patient. Patient remains confused. Will continue to monitor.

## 2020-12-04 NOTE — PROGRESS NOTES
Patient has set bed alarm off three times so far today d/t confusion and has pulled IV out. Will continue to assess for need of telesitter.

## 2020-12-04 NOTE — PROGRESS NOTES
Progress Note    SUBJECTIVE:  FU related to  / Lethargy still. More alert. OBJECTIVE:    Vitals:   TEMPERATURE:  Current - Temp: 99 °F (37.2 °C);  Max - Temp  Av.1 °F (36.7 °C)  Min: 97.2 °F (36.2 °C)  Max: 99 °F (37.2 °C)  RESPIRATIONS RANGE: Resp  Av.5  Min: 16  Max: 18  PULSE RANGE: Pulse  Av.3  Min: 62  Max: 69  BLOOD PRESSURE RANGE:  Systolic (82ECK), FAR:643 , Min:134 , XXE:352   ; Diastolic (90PKZ), ANN:99, Min:48, Max:69    PULSE OXIMETRY RANGE: SpO2  Av.3 %  Min: 94 %  Max: 98 %  24HR INTAKE/OUTPUT:      Intake/Output Summary (Last 24 hours) at 2020 1624  Last data filed at 2020 1453  Gross per 24 hour   Intake 1134.12 ml   Output 1825 ml   Net -690.88 ml     -----------------------------------------------------------------  Exam:  General: A & O x3  HEENT: Supple neck & negative  Heart: Regular  Lungs: diminished breath sounds bilaterally  Abdomen: Normal & soft, No tenderness and BS normal  Extremities:  No edema   Neuro: NonFocal     -----------------------------------------------------------------  Diagnostic Data:  Lab Results   Component Value Date    WBC 4.3 2020    HGB 9.5 (L) 2020     (L) 2020       Lab Results   Component Value Date    BUN 38 (H) 2020    CREATININE 1.97 (H) 2020     2020    K 5.0 2020    CALCIUM 8.8 2020     2020    CO2 19 (L) 2020    LABGLOM 32 (L) 2020       Lab Results   Component Value Date    WBCUA NOT REPORTED 2020    RBCUA 0 TO 2 2020    EPITHUA NOT REPORTED 2020    LEUKOCYTESUR NEGATIVE 2020    SPECGRAV 1.020 2020    GLUCOSEU NEGATIVE 2020    KETUA NEGATIVE 2020    PROTEINU TRACE (A) 2020    HGBUR TRACE (A) 2020    CASTUA NOT REPORTED 2020    CRYSTUA NOT REPORTED 2020    BACTERIA NOT REPORTED 2020    YEAST NOT REPORTED 2020       Lab Results   Component Value Date    MYOGLOBIN 57

## 2020-12-04 NOTE — PROGRESS NOTES
Physician Progress Note      Rubia Chavez  [facility] #:                  [id number]  :                       1934  ADMIT DATE:       12/3/2020 1:48 AM  DISCH DATE:  RESPONDING  PROVIDER #:        Jaye Jin MD          QUERY TEXT:    Patient admitted with COVID 19. Noted documentation of acute respiratory   failure in H&P on 12/3/2020. Please indicate one of the following and document   in the medical record: The medical record reflects the following:  Risk Factors: COVID 19, DM2  Clinical Indicators: RR 14 - 18, SpO2 99% on RA  Treatment: no O2 needed, RT    Thank you,  Ady Winslow, IKE CDI Supervisor    Acute Respiratory Failure Clinical Indicators per  MS-DRG Training Guide and   Quick Reference Guide:  pO2 < 60 mmHg or SpO2 (pulse oximetry) < 91% breathing room air  pCO2 > 50 and pH < 7.35  P/F ratio (pO2 / FIO2) < 300  pO2 decrease or pCO2 increase by 10 mmHg from baseline (if known)  Supplemental oxygen of 40% or more  Presence of respiratory distress, tachypnea, dyspnea, shortness of breath,   wheezing  Unable to speak in complete sentences  Use of accessory muscles to breathe  Extreme anxiety and feeling of impending doom  Tripod position  Confusion/altered mental status/obtunded  Options provided:  -- Acute Respiratory Failure currently as evidenced by, Please document   evidence.   -- Currently resolved Acute Respiratory Failure was evidenced by, Please   document evidence  -- Acute Respiratory Failure ruled out after study  -- Other - I will add my own diagnosis  -- Disagree - Not applicable / Not valid  -- Disagree - Clinically unable to determine / Unknown  -- Refer to Clinical Documentation Reviewer    PROVIDER RESPONSE TEXT:    This patient is in acute respiratory failure as evidenced by Hypoxia    Query created by: Pia Scherer on 2020 8:48 AM      Electronically signed by:  Jaye Jin MD 2020 2:35 PM

## 2020-12-04 NOTE — PLAN OF CARE
Problem: Airway Clearance - Ineffective  Goal: Achieve or maintain patent airway  Outcome: Ongoing     Problem: Gas Exchange - Impaired  Goal: Absence of hypoxia  Outcome: Ongoing     Problem: Breathing Pattern - Ineffective  Goal: Ability to achieve and maintain a regular respiratory rate  Outcome: Ongoing     Problem:  Body Temperature -  Risk of, Imbalanced  Goal: Ability to maintain a body temperature within defined limits  Outcome: Ongoing     Problem: Isolation Precautions - Risk of Spread of Infection  Goal: Prevent transmission of infection  Outcome: Ongoing     Problem: Nutrition Deficits  Goal: Optimize nutrtional status  Outcome: Ongoing     Problem: Risk for Fluid Volume Deficit  Goal: Maintain normal serum potassium, sodium, calcium, phosphorus, and pH  Outcome: Ongoing     Problem: Fatigue  Goal: Verbalize increase energy and improved vitality  Outcome: Ongoing     Problem: Falls - Risk of:  Goal: Will remain free from falls  Description: Will remain free from falls  Outcome: Ongoing     Problem: Skin Integrity:  Goal: Absence of new skin breakdown  Description: Absence of new skin breakdown  Outcome: Ongoing     Problem: Confusion - Acute:  Goal: Absence of continued neurological deterioration signs and symptoms  Description: Absence of continued neurological deterioration signs and symptoms  Outcome: Ongoing     Problem: Injury - Risk of, Physical Injury:  Goal: Will remain free from falls  Description: Will remain free from falls  Outcome: Ongoing

## 2020-12-05 PROBLEM — I21.4 NSTEMI (NON-ST ELEVATED MYOCARDIAL INFARCTION) (HCC): Status: RESOLVED | Noted: 2020-01-01 | Resolved: 2020-01-01

## 2020-12-05 PROBLEM — U07.1 ACUTE KIDNEY INJURY DUE TO COVID-19 (HCC): Status: ACTIVE | Noted: 2020-01-01

## 2020-12-05 PROBLEM — N17.9 ACUTE KIDNEY INJURY DUE TO COVID-19 (HCC): Status: ACTIVE | Noted: 2020-01-01

## 2020-12-05 NOTE — DISCHARGE SUMMARY
Discharge Summary    Dorie Perez  :  1934  MRN:  [id number]    Admit date:  12/3/2020      Discharge date:   2020    Admitting Physician:  Scott Bonilla MD    Discharge Diagnoses:      Principal Problem:    COVID-19 virus infection  Active Problems:    Diabetes mellitus ([facility]    Essential hypertension, benign    CKD stage 3 due to type 2 diabetes mellitus ([facility]    Acquired hypothyroidism    Dehydration    Acute kidney injury due to COVID-19 [facility])  Resolved Problems:    * No resolved hospital problems.  *      Active Hospital Problems    Diagnosis Date Noted    Acute kidney injury due to COVID-19 ([facility] [U07.1, N17.9] 2020    COVID-19 virus infection [U07.1] 2020    Dehydration [E86.0] 2020    Acquired hypothyroidism [E03.9] 2017    CKD stage 3 due to type 2 diabetes mellitus [facility] [E11.22, N18.30] 12/15/2016    Diabetes mellitus ([facility] [E11.9] 2012    Essential hypertension, benign [I10] 2012       [facility]charge Medications:       [facility][id number]    Printed on:20 1007   Medication Information                      acetaminophen (TYLENOL) 500 MG tablet  Take 1,000 mg by mouth every 8 hours              albuterol (PROVENTIL) (2.5 MG/3ML) 0.083% nebulizer solution  Take 2.5 mg by nebulization every 4 hours as needed for Shortness of Breath             aspirin 81 MG chewable tablet  Take 1 tablet by mouth daily             atorvastatin (LIPITOR) 20 MG tablet  Take 20 mg by mouth nightly             Carboxymethylcellulose Sodium (ARTIFICIAL TEARS OP)  Apply 1 drop to eye 2 times daily Both eyes             carvedilol (COREG) 3.125 MG tablet  Take 3.125 mg by mouth 2 times daily             docusate sodium (COLACE) 100 MG capsule  Take 100 mg by mouth 2 times daily             enoxaparin (LOVENOX) 30 MG/0.3ML injection  Inject 0.3 mLs into the skin daily for 10 days             ferrous sulfate (FE TABS 325) 325 (65 Fe) MG EC tablet  Take 325 mg by mouth 2 times daily             finasteride (PROSCAR) 5 MG tablet  Take 5 mg by mouth daily             gabapentin (NEURONTIN) 300 MG capsule  Take 300 mg by mouth 3 times daily. gabapentin (NEURONTIN) 300 MG capsule  Take 1 capsule by mouth 2 times daily for 30 days. Magnesium 400 MG TABS  Take 1 tablet by mouth daily             melatonin 3 MG TABS tablet  Take 3 mg by mouth nightly             montelukast (SINGULAIR) 10 MG tablet  take 1 tablet by mouth once daily             oxyCODONE-acetaminophen (PERCOCET) 5-325 MG per tablet  Take 1 tablet by mouth nightly. pantoprazole (PROTONIX) 40 MG tablet  Take 20 mg by mouth 2 times daily             rOPINIRole (REQUIP) 0.5 MG tablet  Take 1 tablet by mouth 2 times daily             tamsulosin (FLOMAX) 0.4 MG capsule  Take 0.4 mg by mouth every evening                  Consultants:  none     Hospital Course: Citlaly Leahy is a 80 y.o. male admitted with COVID 19 and dehydration with ALESSIO. Fluids and supportive care. Limited activity and oral intake. No oxygen required. DC back to ECF (change may improve oral intake    Exam: regular. Clear. No rales. Confused.     Condition: fair    Disposition:   ECF    Patient will be followed by Alethea Walters MD in 1-2 weeks    Signed:  Gustavo Myles  12/5/2020, 10:07 AM

## 2020-12-05 NOTE — PROGRESS NOTES
Telesitter alarming for patient attempting to get out of bed. Patient is restless and agitated. Patient was given a drink of water and assisted with the bathroom. Returned to bed, call light within reach, and bed alarm on.

## 2020-12-05 NOTE — PLAN OF CARE
Problem: Airway Clearance - Ineffective  Goal: Achieve or maintain patent airway  12/4/2020 2145 by Wes Farris RN  Outcome: Ongoing  Note: Patient is able to cough and maintain airway. Problem: Gas Exchange - Impaired  Goal: Absence of hypoxia  12/4/2020 2145 by Wes Farris RN  Outcome: Ongoing  Note: Patient is on continuous pulse ox and in the mid 90s on roomair. Problem: Breathing Pattern - Ineffective  Goal: Ability to achieve and maintain a regular respiratory rate  12/4/2020 2145 by Wes Farris RN  Outcome: Ongoing  Note: Patient has a regular respiratory rate. Problem: Body Temperature -  Risk of, Imbalanced  Goal: Ability to maintain a body temperature within defined limits  12/4/2020 2145 by Wes Farris RN  Outcome: Ongoing  Note: Patient is afebrile. Problem: Isolation Precautions - Risk of Spread of Infection  Goal: Prevent transmission of infection  12/4/2020 2145 by Wes Farris RN  Outcome: Ongoing  Note: Patient remains in isolation. Problem: Risk for Fluid Volume Deficit  Goal: Maintain normal heart rhythm  Outcome: Ongoing  Note: Patient has a regular heart rhythm. Problem: Fatigue  Goal: Verbalize increase energy and improved vitality  12/4/2020 2145 by Wes Farris RN  Outcome: Ongoing  Note: Patient denies fatigue. Problem: Falls - Risk of:  Goal: Will remain free from falls  Description: Will remain free from falls  12/4/2020 2145 by Wes Farris RN  Outcome: Ongoing  Note: Fall risk assessment done and patient is a high risk for falls. Alarms on as needed for patient safety. Patient being monitored on a regular basis. No falls noted at this time. Problem: Skin Integrity:  Goal: Will show no infection signs and symptoms  Description: Will show no infection signs and symptoms  Outcome: Ongoing  Note: Patient is being turned every two hours with pillows when allowed. No new open areas or redness noted.  Will continue to assess        Problem: Confusion - Acute:  Goal: Absence of continued neurological deterioration signs and symptoms  Description: Absence of continued neurological deterioration signs and symptoms  12/4/2020 2145 by Ludmila Nava RN  Outcome: Ongoing  Note: Patient remains confused at baseline. Problem: Injury - Risk of, Physical Injury:  Goal: Will remain free from falls  Description: Will remain free from falls  12/4/2020 2145 by Ludmila Nava RN  Outcome: Ongoing  Note: Fall risk assessment done and patient is a high risk for falls. Alarms on as needed for patient safety. Patient being monitored on a regular basis. No falls noted at this time.

## 2020-12-05 NOTE — PROGRESS NOTES
Writer entered the room to the bed alarm alarming, patient was sitting on the edge of the bed. Patients gown was off, tele was off, IV was pulled tight, and a skin tear with bleeding is noted the the right wrist. Patient has made several attempts to get out of bed. Patient is unable to be reoriented therefore a telesitter is activated for patient safety. Will continue to assess patient. Bed alarm is on and call light within reach.

## 2020-12-05 NOTE — DISCHARGE INSTR - DIET

## 2022-03-07 NOTE — ED NOTES
This nurse called Wendy to get report; spoke with Joellen Kohli. Per Joellen Kohli pt was found unresponsive on the toilet, pt was aroused by sternal rub, found to have heart rate in the 40's. Pt was tested for COVID on 11/27/2020 and found negative. Per Joellen Kohli family had not yet been notified. Dr. Rich [place] notified of this report.       Enzo Castillo RN  12/02/20 1800 diabetes/gastrointestinal

## 2022-09-07 NOTE — ED TRIAGE NOTES
Home medication list is complete. Patient verbalized medications and dosages. Warm Compresses 5 min 3x/day x 2 weeks.

## 2022-10-11 NOTE — CARE COORDINATION
Patient is a transfer of care from [facility]. He needs the following meds refilled before his upcoming New Patient appointment on 11/4/2022.     Esomeprazole (nexIUM) 40 MG capsule      [id number] - [place], [place] - [address] [facility] - [phone]  - [phone] FX    PLEASE ADVISE.        
scheduled CT scan for MOnday, and then follow up with Lola Retort on 07/29/19:    Goals reviewed. Patient to continue to work to improve:   Goals        Care Coordination     Medication Management      I will take my medication as directed. I will notify my provider of any problems with medications, like adverse effects or side effects. I will notify my provider/Care Coordinator if I am unable to afford my medications. I will notify my provider for advice before I stop taking any of my medication. I will check my blood pressure daily, and if SBP less than 100, I will not take my Lisinopril    Barriers: impairment:  visual, lack of support and overwhelmed by complexity of regimen  Plan for overcoming my barriers: patient to follow with Nurse Care Coordinator  Patient to check Blood Pressure daily in the am.   Patient to check blood sugars daily in the am.  Confidence: 7/10  Anticipated Goal Completion Date: 08/25/19              Education Reviewed with patient today: See Education Module. · Patient to reach out to care coordinator at [phone] or MD office with questions. Care Coordinator Plan of Care: This nurse CC will plan to follow up after PCP appointment to assess for further instructions/orders/medications  -Will follow up on Dr. Bobbi Brown appointment  -will address further Diabetes follow up, patient to monitor and update PCP with blood sugar log  -will forward to Lola Retort regarding, \"patient's leg cramping at night (patient has labs ordered, but is planning to do these on Monday 07/22/19).                  Care Coordination Interventions    Program Enrollment:  Rising Risk  Referral from Primary Care Provider:  No  Suggested Interventions and Community Resources  Assocation for Blind:  Not Started  Diabetes Education:  Declined (Comment: Pt referred to [facility] ED, has not agreed to appt)  Fall Risk Prevention:  Completed  Senior Services:  Not Started  Transportation Support:  Completed  Zone

## (undated) DEVICE — RX DELIVERY SYSTEM: Brand: NAVIFLEX™ RX DELIVERY SYSTEM

## (undated) DEVICE — FORCEPS BX L240CM JAW DIA22MM ORNG STD CAP W NDL RAD JAW 4

## (undated) DEVICE — SPHINCTEROTOME: Brand: DREAMTOME™ RX 44

## (undated) DEVICE — CATHETER PTCA L75CM L2CM OD5FR ODSEC5MM .035IN PERIPH 2

## (undated) DEVICE — RETRIEVAL BALLOON CATHETER: Brand: EXTRACTOR™ PRO RX

## (undated) DEVICE — ESOPHAGEAL BALLOON DILATATION CATHETER: Brand: CRE FIXED WIRE

## (undated) DEVICE — GARMENT COMPR STD FOR 17IN CALF UNIF THER FLOTRN